# Patient Record
Sex: FEMALE | Race: WHITE | NOT HISPANIC OR LATINO | Employment: OTHER | URBAN - METROPOLITAN AREA
[De-identification: names, ages, dates, MRNs, and addresses within clinical notes are randomized per-mention and may not be internally consistent; named-entity substitution may affect disease eponyms.]

---

## 2018-04-05 ENCOUNTER — OFFICE VISIT (OUTPATIENT)
Dept: MEDICAL GROUP | Facility: MEDICAL CENTER | Age: 68
End: 2018-04-05
Payer: MEDICARE

## 2018-04-05 VITALS
HEIGHT: 68 IN | SYSTOLIC BLOOD PRESSURE: 130 MMHG | BODY MASS INDEX: 21.22 KG/M2 | WEIGHT: 139.99 LBS | OXYGEN SATURATION: 98 % | TEMPERATURE: 97.7 F | HEART RATE: 62 BPM | RESPIRATION RATE: 16 BRPM | DIASTOLIC BLOOD PRESSURE: 72 MMHG

## 2018-04-05 DIAGNOSIS — G62.9 NEUROPATHY: ICD-10-CM

## 2018-04-05 DIAGNOSIS — M81.0 OSTEOPOROSIS OF MULTIPLE SITES: ICD-10-CM

## 2018-04-05 DIAGNOSIS — Z85.43 HISTORY OF OVARIAN CANCER: ICD-10-CM

## 2018-04-05 DIAGNOSIS — Z76.89 ESTABLISHING CARE WITH NEW DOCTOR, ENCOUNTER FOR: ICD-10-CM

## 2018-04-05 DIAGNOSIS — F51.01 PRIMARY INSOMNIA: ICD-10-CM

## 2018-04-05 DIAGNOSIS — R23.2 VASOMOTOR FLUSHING: ICD-10-CM

## 2018-04-05 DIAGNOSIS — Z85.828 HISTORY OF SKIN CANCER: ICD-10-CM

## 2018-04-05 DIAGNOSIS — H61.21 IMPACTED CERUMEN OF RIGHT EAR: ICD-10-CM

## 2018-04-05 DIAGNOSIS — I10 ESSENTIAL HYPERTENSION: ICD-10-CM

## 2018-04-05 DIAGNOSIS — K86.89 PANCREATIC INSUFFICIENCY: ICD-10-CM

## 2018-04-05 PROCEDURE — 99204 OFFICE O/P NEW MOD 45 MIN: CPT | Performed by: INTERNAL MEDICINE

## 2018-04-05 RX ORDER — CHOLECALCIFEROL (VITAMIN D3) 50 MCG
TABLET ORAL DAILY
COMMUNITY
End: 2018-04-05

## 2018-04-05 ASSESSMENT — PATIENT HEALTH QUESTIONNAIRE - PHQ9: CLINICAL INTERPRETATION OF PHQ2 SCORE: 0

## 2018-04-05 NOTE — PROGRESS NOTES
Subjective:   Zena Paz is a 67 y.o. female here today to establish care and          Chief complaint: Wax in right ear, history of ovarian cancer    1. Establishing care with new doctor, encounter for    Patient moved from the [place] area with her  recently. They have been going back and forth to [place] for about 5 years. She is an avid hiker and has climbed many high peaks in [place], [place], and throughout the [place].    2. History of ovarian cancer    Patient was diagnosed with stage IIIC ovarian cancer in 2012. She is status post radical hysterectomy and chemotherapy. She is followed every 3 months at [facility] with tumor markers and every 6 months with scans. She currently has no evidence of disease. She attends a free [facility] [place] every summer for survivors of ovarian cancer.    3. Neuropathy (CMS-HCC)    Patient has neuropathy in hands and feet due to her chemotherapy regimen. At this time she is not taking anything for it is controlled.    4. Essential hypertension    Currently blood pressures are well controlled on low-dose atenolol.    5. Pancreatic insufficiency    Patient was diagnosed with mild pancreatic insufficiency by a gastroenterologist in [place]. She takes Creon occasionally, she is not convinced that this is really helping much however.    6. Vasomotor flushing    Patient was started on Lexapro 10 mg many years ago for hot flashes. She feels that also has help to stabilize her mood.    7. Primary insomnia    Patient takes Xanax 0.5 mg infrequently for insomnia.    8. History of skin cancer    Patient has a history of non-melanoma skin cancer, she is requesting referral to dermatology for her annual skin survey.    9. Osteoporosis of multiple sites    Following her chemotherapy patient developed osteoporosis and was started on Prolia. She has been on this for almost 4 years now. She was recommended to transition to Fosamax in August 2018. Her last DEXA scan was in 2017 and revealed osteopenia  (improved bone density from previous).    10. Impacted cerumen of right ear          Current medicines (including changes today)  Current Outpatient Prescriptions   Medication Sig Dispense Refill   • Pancrelipase, Lip-Prot-Amyl, 58237 units Cap DR Particles Take  by mouth.     • atenolol (TENORMIN) 25 MG TABS Take 25 mg by mouth every evening.     • escitalopram (LEXAPRO) 10 MG TABS Take 10 mg by mouth every morning.     • aspirin (ASA) 81 MG CHEW Take 81 mg by mouth every day.     • Multiple Vitamins-Minerals (SENIOR MULTIVITAMIN PLUS PO) Take 1 Tab by mouth every morning.     • docosahexanoic acid (OMEGA 3 FA) 1000 MG CAPS Take 1,000 mg by mouth every morning.     • vitamin D (CHOLECALCIFEROL) 1000 UNIT TABS Take 2,000 Units by mouth every day.     • lorazepam (ATIVAN) 0.5 MG TABS Take 0.5 mg by mouth 3 times a day as needed. For anxiety/sleep/nausea      • Polyethyl Glycol-Propyl Glycol (SYSTANE OP) Place 1 Drop in both eyes 2 times a day as needed. Indications: **OTC**       No current facility-administered medications for this visit.      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    Social History     Social History   • Marital status:      Spouse name: N/A   • Number of children: N/A   • Years of education: N/A     Social History Main Topics   • Smoking status: Never Smoker   • Smokeless tobacco: Never Used   • Alcohol use Yes      Comment: occasional   • Drug use: No   • Sexual activity: Yes     Partners: Male     Birth control/ protection: Post-Menopausal     Other Topics Concern   • Sleep Concern No   • Weight Concern No   • Exercise Yes     Social History Narrative   • No narrative on file     Family History   Problem Relation Age of Onset   • Heart Disease Mother    • Stroke Father        ROS       - Constitutional: Negative for  "fever, chills, unexpected weight change, and fatigue/generalized weakness.     - HEENT: Negative for headaches, vision changes      - Respiratory: Negative for cough, Shortness of breath    - Cardiovascular: Negative for chest pain and bilateral lower extremity edema.     - Gastrointestinal: Negative for heartburn,  abdominal pain,  occasional loose stools    - Genitourinary: Negative for dysuria  and urinary incontinence.    - Musculoskeletal: Negative for  back pain and joint pain.     - Skin: Negative for rash     - Neurological: Negative for dizziness,  tremors, focal weakness     - Psychiatric/Behavioral: Negative for depression and memory loss.             Objective:     Blood pressure 130/72, pulse 62, temperature 36.5 °C (97.7 °F), resp. rate 16, height 1.715 m (5' 7.5\"), weight 63.5 kg (139 lb 15.9 oz), SpO2 98 %, not currently breastfeeding. Body mass index is 21.6 kg/m².     Physical Exam:  Constitutional: Alert, no distress.  Skin: Warm, dry, good turgor, no rashes in visible areas.  Eye: Equal, round and reactive, conjunctiva clear, lids normal.  ENMT: Lips without lesions, good dentition, oropharynx clear. Hearing grossly intact.  Neck: No masses, no thyromegaly. No cervical or supraclavicular lymphadenopathy  Respiratory: Unlabored respiratory effort, lungs clear to auscultation, no wheezes, no rhonchi.  Cardiovascular: Regular rate and rhythm, without murmur, no edema.  Abdomen: Soft, non-tender, no masses, no hepatosplenomegaly.  Psych: Alert and oriented x3, normal affect and mood. Insight and judgment good            Assessment and Plan:   The following treatment plan was discussed    1. Establishing care with new doctor, encounter for    Last colonoscopy 2 years ago, continues to be followed closely by [facility].    2. History of ovarian cancer    Continues to be followed by [facility] every 3 months    3. Neuropathy (CMS-HCC)    Stable, no treatment indicated    4. Essential " hypertension    Well-controlled, continue atenolol 25 mg.    5. Pancreatic insufficiency        6. Vasomotor flushing    Controlled, continue Lexapro 10 mg    7. Primary insomnia    Not active issue currently, has when necessary Xanax    8. History of skin cancer      - REFERRAL TO DERMATOLOGY    9. Osteoporosis of multiple sites    Will start Fosamax 70 mg weekly in August, up-to-date on bone density    10. Impacted cerumen of right ear    Irrigated today      Followup: Return in about 4 months (around 8/5/2018).

## 2018-05-21 LAB — HBA1C MFR BLD: 5.8 % (ref ?–5.8)

## 2018-05-23 ENCOUNTER — PATIENT MESSAGE (OUTPATIENT)
Dept: MEDICAL GROUP | Facility: MEDICAL CENTER | Age: 68
End: 2018-05-23

## 2018-05-24 ENCOUNTER — PATIENT MESSAGE (OUTPATIENT)
Dept: MEDICAL GROUP | Facility: MEDICAL CENTER | Age: 68
End: 2018-05-24

## 2018-05-24 NOTE — TELEPHONE ENCOUNTER
From: Zena Paz  To: aTmmy Garza M.D.  Sent: 5/24/2018 8:05 AM PDT  Subject: Test Result Question    Clemente Garza,  Thank you for getting back to me so quickly. My white blood count was 3.59 this time. Last July it was 5.18. It’s been 5+ years since I finished chemo, so I am puzzled. It was 3.86 in 2014, but in between now and then it has been in the 4s, 5s, and one 6.05.   I’m feeling a bit tired, but otherwise I’m ok. I have my next appointment with you in mid- August.   Padmini   ----- Message -----  From: Tammy Garza M.D.  Sent: 5/24/2018 7:38 AM PDT  To: Zena Paz  Subject: RE: Test Result Question  Clemente Washington,    I would be happy to follow you as your PCP. I don't believe I have seen your recent CBC, however sometimes the white cell count can remain low following chemotherapy. Do you know how low it was? There wouldn't be anything you could do about it at this time, we can just follow it. As long as you're not getting frequent infections it is adequate.  Best,   Dr. Garza    ----- Message -----   From: Zena Paz   Sent: 5/23/2018 1:30 PM PDT   To: Tammy Garza M.D.  Subject: Test Result Question    Clemente Garza,  I wanted to let you know that I just had some blood tests at [facility]. The CA-125 and HE4 (still waiting for results of HE4) were ordered by my oncologist, Dr. Neil Scott. The other tests were ordered by my internist, Dr. Claudia Bernstein. She is retiring in 2 days! If it’s ok with you, I will not look for a replacement internist at [facility]. I’m hoping that I can just have you as my primary care physician.  I have a question about my white blood cell count. It was very low during chemo, but I’m wondering why it might still be low now. Is there anything special I should be doing?  Thank you.  Padmini

## 2018-07-25 RX ORDER — ESCITALOPRAM OXALATE 10 MG/1
10 TABLET ORAL EVERY MORNING
Qty: 90 TAB | Refills: 0 | Status: SHIPPED | OUTPATIENT
Start: 2018-07-25 | End: 2018-08-20

## 2018-07-25 RX ORDER — ATENOLOL 25 MG/1
25 TABLET ORAL EVERY EVENING
Qty: 90 TAB | Refills: 0 | Status: SHIPPED | OUTPATIENT
Start: 2018-07-25 | End: 2018-08-06 | Stop reason: SDUPTHER

## 2018-07-25 NOTE — TELEPHONE ENCOUNTER
Was the patient seen in the last year in this department? Yes     Does patient have an active prescription for medications requested? No     Received Request Via: Patient     Future Appointments       Provider Department Center    8/14/2018 1:00 PM Tammy Garza M.D. [facility]    11/19/2018 8:30 AM Miriam Church M.D. [facility]

## 2018-08-14 ENCOUNTER — PATIENT MESSAGE (OUTPATIENT)
Dept: MEDICAL GROUP | Facility: MEDICAL CENTER | Age: 68
End: 2018-08-14

## 2018-08-14 ENCOUNTER — OFFICE VISIT (OUTPATIENT)
Dept: MEDICAL GROUP | Facility: MEDICAL CENTER | Age: 68
End: 2018-08-14
Payer: MEDICARE

## 2018-08-14 VITALS
RESPIRATION RATE: 16 BRPM | SYSTOLIC BLOOD PRESSURE: 122 MMHG | BODY MASS INDEX: 21.38 KG/M2 | OXYGEN SATURATION: 97 % | TEMPERATURE: 98.2 F | DIASTOLIC BLOOD PRESSURE: 76 MMHG | HEIGHT: 68 IN | WEIGHT: 141.09 LBS | HEART RATE: 58 BPM

## 2018-08-14 DIAGNOSIS — R79.89 ELEVATED PTHRP LEVEL: ICD-10-CM

## 2018-08-14 DIAGNOSIS — N18.30 CHRONIC KIDNEY DISEASE, STAGE 3, MOD DECREASED GFR (HCC): ICD-10-CM

## 2018-08-14 DIAGNOSIS — Z85.43 HISTORY OF OVARIAN CANCER: ICD-10-CM

## 2018-08-14 DIAGNOSIS — Z23 NEED FOR VACCINATION: ICD-10-CM

## 2018-08-14 DIAGNOSIS — Z12.31 ENCOUNTER FOR SCREENING MAMMOGRAM FOR BREAST CANCER: ICD-10-CM

## 2018-08-14 DIAGNOSIS — M81.0 AGE-RELATED OSTEOPOROSIS WITHOUT CURRENT PATHOLOGICAL FRACTURE: ICD-10-CM

## 2018-08-14 PROBLEM — H61.21 IMPACTED CERUMEN OF RIGHT EAR: Status: RESOLVED | Noted: 2018-04-05 | Resolved: 2018-08-14

## 2018-08-14 PROBLEM — Z85.828 HISTORY OF SKIN CANCER: Status: RESOLVED | Noted: 2018-04-05 | Resolved: 2018-08-14

## 2018-08-14 PROCEDURE — 99214 OFFICE O/P EST MOD 30 MIN: CPT | Mod: 25 | Performed by: INTERNAL MEDICINE

## 2018-08-14 PROCEDURE — 90632 HEPA VACCINE ADULT IM: CPT | Performed by: INTERNAL MEDICINE

## 2018-08-14 PROCEDURE — 90471 IMMUNIZATION ADMIN: CPT | Performed by: INTERNAL MEDICINE

## 2018-08-14 RX ORDER — ALENDRONATE SODIUM 70 MG/1
TABLET ORAL
COMMUNITY
End: 2018-08-20

## 2018-08-14 RX ORDER — HYDROCORTISONE ACETATE 25 MG/1
25 SUPPOSITORY RECTAL EVERY 12 HOURS
COMMUNITY
End: 2018-08-20

## 2018-08-15 ENCOUNTER — PATIENT MESSAGE (OUTPATIENT)
Dept: MEDICAL GROUP | Facility: MEDICAL CENTER | Age: 68
End: 2018-08-15

## 2018-08-15 NOTE — PROGRESS NOTES
Chief Complaint   Patient presents with   • Immunizations     pt flying out to [facility]     Chief complaint: Follow-up ovarian cancer and osteoporosis.    HISTORY OF PRESENT ILLNESS: Patient is a 68 y.o. female patient who presents today to discuss the evaluation and management of:          1. History of ovarian cancer    Patient was diagnosed with stage IIIc ovarian cancer in 2012.  She is status post total hysterectomy and chemotherapy.  She just had her follow-up with her oncologist at [facility] a couple of weeks ago.  She had extensive blood work, whole-body MRI and physical exam, all of which were negative for recurrent disease.  She is doing well, she has residual neuropathy from the chemo.    2. Chronic kidney disease, stage 3, mod decreased GFR    Patient's GFR is in the 40s since her chemotherapy.  It has been stable, she has seen a nephrologist at [facility] and follows with him annually.    3. Encounter for screening mammogram for breast cancer    Patient is due for her annual mammogram.  She is requesting whole breast screening ultrasound as she has very dense breasts.    4. Age-related osteoporosis without current pathological fracture    Patient has been treated with Prolia for 4 years.  Her bone specialist in [place] recommended that she transition over to Fosamax this month.  Last DEXA scan in 2017 revealed osteopenia, which was improved from prior.    5. Elevated PTHrP level (HCC)    Interestingly, patient has had elevated PTH levels over the last year or so.  Looking through care everywhere, her most recent one was 118, up from 105 about 6 months ago.  Calcium levels have been normal.  She had a thyroid ultrasound which did not show any parathyroid nodules.    6. Need for vaccination    Patient will be traveling to [facility] for a track in October.  She is inquiring about any vaccinations that she might need.  She is up-to-date with Tdap, had typhoid 1 year ago, and hepatitis B many years ago.        Patient Active  Problem List    Diagnosis Date Noted   • Chronic kidney disease, stage 3, mod decreased GFR 08/14/2018   • Age-related osteoporosis without current pathological fracture 08/14/2018   • Elevated PTHrP level (HCC) 08/14/2018   • History of ovarian cancer 04/05/2018   • Neuropathy (HCC) 04/05/2018   • Pancreatic insufficiency 04/05/2018   • Vasomotor flushing 04/05/2018   • Primary insomnia 04/05/2018   • HTN (hypertension) 08/30/2012        Allergies:Latex and Tetracycline    Current meds including changes today  Current Outpatient Prescriptions   Medication Sig Dispense Refill   • hydrocortisone (ANUSOL-HC) 25 MG Suppos Insert 25 mg in rectum every 12 hours.     • vitamin D (CHOLECALCIFEROL) 1000 UNIT TABS Take 2,000 Units by mouth every day.     • alendronate (FOSAMAX) 70 MG Tab Take  by mouth.     • atenolol (TENORMIN) 25 MG Tab Take 1 Tab by mouth every evening. 10 Tab 0   • escitalopram (LEXAPRO) 10 MG Tab Take 1 Tab by mouth every morning. 90 Tab 0   • Pancrelipase, Lip-Prot-Amyl, 04912 units Cap DR Particles Take  by mouth.     • aspirin (ASA) 81 MG CHEW Take 81 mg by mouth every day.     • lorazepam (ATIVAN) 0.5 MG TABS Take 0.5 mg by mouth 3 times a day as needed. For anxiety/sleep/nausea      • Multiple Vitamins-Minerals (SENIOR MULTIVITAMIN PLUS PO) Take 1 Tab by mouth every morning.     • docosahexanoic acid (OMEGA 3 FA) 1000 MG CAPS Take 1,000 mg by mouth every morning.     • Polyethyl Glycol-Propyl Glycol (SYSTANE OP) Place 1 Drop in both eyes 2 times a day as needed. Indications: **OTC**       No current facility-administered medications for this visit.      Social History   Substance Use Topics   • Smoking status: Never Smoker   • Smokeless tobacco: Never Used   • Alcohol use Yes      Comment: occasional     Social History     Social History Narrative   • No narrative on file       Family History   Problem Relation Age of Onset   • Heart Disease Mother    • Stroke Father        Review of Systems:  No  "chest pain, No shortness of breath, No dyspnea on exertion  Gastrointestinal ROS: No abdominal pain, No nausea, vomiting, diarrhea, or constipation        Exam:      Blood pressure 122/76, pulse (!) 58, temperature 36.8 °C (98.2 °F), resp. rate 16, height 1.715 m (5' 7.5\"), weight 64 kg (141 lb 1.5 oz), SpO2 97 %, not currently breastfeeding.  General:  Well nourished, well developed female in NAD affect and mood within normal limits  Head is grossly normal.  Neck: Supple without adenopathy  Pulmonary: Clear to ausculation.  Normal effort. No rales, rhonchi, or wheezing.  Cardiovascular: Regular rate and rhythm without murmur.   Extremities: no clubbing, cyanosis, or edema.  Neuro: moves all extremities symmetrically    Please note that this dictation was created using voice recognition software. I have made every reasonable attempt to correct obvious errors, but I expect that there are errors of grammar and possibly content that I did not discover before finalizing the note.    Assessment/Plan:  1. History of ovarian cancer    -Without evidence of recurrent disease    2. Chronic kidney disease, stage 3, mod decreased GFR    Stable function, continue to monitor    3. Encounter for screening mammogram for breast cancer      - MA-MAMMO SCREENING BILAT W/ABDI W/CAD; Future  - US-SCREENING WHOLE BREAST (3D SCREENING); Future    4. Age-related osteoporosis without current pathological fracture    Transitioning to Fosamax 70 mg weekly    5. Elevated PTHrP level (HCC)    -Monitor PTH, if it continues to rise consider doing a nuclear study as this is more sensitive for parathyroid adenomas  - PTH INTACT W/CA    6. Need for vaccination      - HEPATITIS A VACCINE ADULT IM    Followup: Return in about 6 months (around 2/14/2019).        "

## 2018-08-16 ENCOUNTER — PATIENT MESSAGE (OUTPATIENT)
Dept: MEDICAL GROUP | Facility: MEDICAL CENTER | Age: 68
End: 2018-08-16

## 2018-08-16 NOTE — TELEPHONE ENCOUNTER
From: Zena Paz  To: Tammy Garza M.D.  Sent: 8/16/2018 12:06 PM PDT  Subject: Prescription Question    Hi Dr. Garza,  I would actually MUCH rather be on Prolia, since its just 2x per year, and I didn’t have any noticeable side affects. I do have gastrointestinal issues, and have always been a bit concerned about taking Fosamax. I only agreed to the Fosamax because I was told that I that 4 years was the maximum time to be on Prolia, and that one can’t just stop suddenly.  What should we do?   Thanks,   Padmini   ----- Message -----  From: Tammy Garza M.D.  Sent: 8/16/2018 8:06 AM PDT  To: Zena Paz  Subject: RE: Prescription Question  Padmini,    I do not have a strong opinion either way. My understanding was that the bone specialist at [facility] had recommended the transition to Fosamax.  I do believe that either one is fine. Let me know how you wish to proceed.    Dr Garza    ----- Message -----   From: Zena Paz   Sent: 8/15/2018 5:39 PM PDT   To: Tammy Garza M.D.  Subject: Prescription Question    Dear Dr. Garza,  I just picked up my prescription for Fosamax, but upon doing some research, I am wondering whether stopping Prolia is the right decision for me. I have been on it 4 years, but am now reading that many patients stay on it for 8-10 years. I didn’t realize this when you and I talked. What do you think?  Sorry to bother you with so many extra questions.  Padmini

## 2018-08-19 ENCOUNTER — APPOINTMENT (OUTPATIENT)
Dept: RADIOLOGY | Facility: MEDICAL CENTER | Age: 68
DRG: 389 | End: 2018-08-19
Attending: EMERGENCY MEDICINE
Payer: MEDICARE

## 2018-08-19 ENCOUNTER — HOSPITAL ENCOUNTER (INPATIENT)
Facility: MEDICAL CENTER | Age: 68
LOS: 1 days | DRG: 389 | End: 2018-08-21
Attending: EMERGENCY MEDICINE | Admitting: HOSPITALIST
Payer: MEDICARE

## 2018-08-19 DIAGNOSIS — K56.609 SMALL BOWEL OBSTRUCTION (HCC): ICD-10-CM

## 2018-08-19 LAB
BASOPHILS # BLD AUTO: 0.8 % (ref 0–1.8)
BASOPHILS # BLD: 0.11 K/UL (ref 0–0.12)
EOSINOPHIL # BLD AUTO: 0.07 K/UL (ref 0–0.51)
EOSINOPHIL NFR BLD: 0.5 % (ref 0–6.9)
ERYTHROCYTE [DISTWIDTH] IN BLOOD BY AUTOMATED COUNT: 41.6 FL (ref 35.9–50)
HCT VFR BLD AUTO: 43.7 % (ref 37–47)
HGB BLD-MCNC: 14.9 G/DL (ref 12–16)
IMM GRANULOCYTES # BLD AUTO: 0.05 K/UL (ref 0–0.11)
IMM GRANULOCYTES NFR BLD AUTO: 0.4 % (ref 0–0.9)
LACTATE BLD-SCNC: 3.5 MMOL/L (ref 0.5–2)
LYMPHOCYTES # BLD AUTO: 2.52 K/UL (ref 1–4.8)
LYMPHOCYTES NFR BLD: 18 % (ref 22–41)
MCH RBC QN AUTO: 30 PG (ref 27–33)
MCHC RBC AUTO-ENTMCNC: 34.1 G/DL (ref 33.6–35)
MCV RBC AUTO: 87.9 FL (ref 81.4–97.8)
MONOCYTES # BLD AUTO: 0.88 K/UL (ref 0–0.85)
MONOCYTES NFR BLD AUTO: 6.3 % (ref 0–13.4)
NEUTROPHILS # BLD AUTO: 10.37 K/UL (ref 2–7.15)
NEUTROPHILS NFR BLD: 74 % (ref 44–72)
NRBC # BLD AUTO: 0 K/UL
NRBC BLD-RTO: 0 /100 WBC
PLATELET # BLD AUTO: 307 K/UL (ref 164–446)
PMV BLD AUTO: 10.2 FL (ref 9–12.9)
RBC # BLD AUTO: 4.97 M/UL (ref 4.2–5.4)
WBC # BLD AUTO: 14 K/UL (ref 4.8–10.8)

## 2018-08-19 PROCEDURE — 85025 COMPLETE CBC W/AUTO DIFF WBC: CPT

## 2018-08-19 PROCEDURE — 83690 ASSAY OF LIPASE: CPT

## 2018-08-19 PROCEDURE — 99285 EMERGENCY DEPT VISIT HI MDM: CPT

## 2018-08-19 PROCEDURE — 700111 HCHG RX REV CODE 636 W/ 250 OVERRIDE (IP): Performed by: EMERGENCY MEDICINE

## 2018-08-19 PROCEDURE — 700105 HCHG RX REV CODE 258: Performed by: EMERGENCY MEDICINE

## 2018-08-19 PROCEDURE — 80053 COMPREHEN METABOLIC PANEL: CPT

## 2018-08-19 PROCEDURE — 83605 ASSAY OF LACTIC ACID: CPT

## 2018-08-19 PROCEDURE — 96374 THER/PROPH/DIAG INJ IV PUSH: CPT

## 2018-08-19 PROCEDURE — 96375 TX/PRO/DX INJ NEW DRUG ADDON: CPT

## 2018-08-19 RX ORDER — MORPHINE SULFATE 4 MG/ML
4 INJECTION, SOLUTION INTRAMUSCULAR; INTRAVENOUS ONCE
Status: COMPLETED | OUTPATIENT
Start: 2018-08-19 | End: 2018-08-19

## 2018-08-19 RX ORDER — ONDANSETRON 2 MG/ML
4 INJECTION INTRAMUSCULAR; INTRAVENOUS ONCE
Status: COMPLETED | OUTPATIENT
Start: 2018-08-19 | End: 2018-08-19

## 2018-08-19 RX ORDER — SODIUM CHLORIDE 9 MG/ML
INJECTION, SOLUTION INTRAVENOUS CONTINUOUS
Status: DISCONTINUED | OUTPATIENT
Start: 2018-08-19 | End: 2018-08-20

## 2018-08-19 RX ADMIN — SODIUM CHLORIDE: 9 INJECTION, SOLUTION INTRAVENOUS at 23:12

## 2018-08-19 RX ADMIN — ONDANSETRON 4 MG: 2 INJECTION, SOLUTION INTRAMUSCULAR; INTRAVENOUS at 23:12

## 2018-08-19 RX ADMIN — MORPHINE SULFATE 4 MG: 4 INJECTION INTRAVENOUS at 23:12

## 2018-08-19 ASSESSMENT — PAIN SCALES - GENERAL
PAINLEVEL_OUTOF10: 10
PAINLEVEL_OUTOF10: 10

## 2018-08-20 PROBLEM — N18.9 ACUTE ON CHRONIC RENAL INSUFFICIENCY: Status: ACTIVE | Noted: 2018-08-20

## 2018-08-20 PROBLEM — D72.829 LEUKOCYTOSIS: Status: ACTIVE | Noted: 2018-08-20

## 2018-08-20 PROBLEM — K56.609 SBO (SMALL BOWEL OBSTRUCTION) (HCC): Status: ACTIVE | Noted: 2018-08-20

## 2018-08-20 PROBLEM — N28.9 ACUTE ON CHRONIC RENAL INSUFFICIENCY: Status: ACTIVE | Noted: 2018-08-20

## 2018-08-20 LAB
ALBUMIN SERPL BCP-MCNC: 4.7 G/DL (ref 3.2–4.9)
ALBUMIN/GLOB SERPL: 1.6 G/DL
ALP SERPL-CCNC: 26 U/L (ref 30–99)
ALT SERPL-CCNC: 11 U/L (ref 2–50)
ANION GAP SERPL CALC-SCNC: 19 MMOL/L (ref 0–11.9)
APPEARANCE UR: CLEAR
AST SERPL-CCNC: 22 U/L (ref 12–45)
BILIRUB SERPL-MCNC: 0.8 MG/DL (ref 0.1–1.5)
BILIRUB UR QL STRIP.AUTO: NEGATIVE
BUN SERPL-MCNC: 41 MG/DL (ref 8–22)
CALCIUM SERPL-MCNC: 10.6 MG/DL (ref 8.5–10.5)
CHLORIDE SERPL-SCNC: 99 MMOL/L (ref 96–112)
CO2 SERPL-SCNC: 17 MMOL/L (ref 20–33)
COLOR UR: ABNORMAL
CREAT SERPL-MCNC: 1.98 MG/DL (ref 0.5–1.4)
GLOBULIN SER CALC-MCNC: 2.9 G/DL (ref 1.9–3.5)
GLUCOSE SERPL-MCNC: 165 MG/DL (ref 65–99)
GLUCOSE UR STRIP.AUTO-MCNC: NEGATIVE MG/DL
KETONES UR STRIP.AUTO-MCNC: 15 MG/DL
LACTATE BLD-SCNC: 1 MMOL/L (ref 0.5–2)
LEUKOCYTE ESTERASE UR QL STRIP.AUTO: NEGATIVE
LIPASE SERPL-CCNC: 45 U/L (ref 11–82)
MICRO URNS: ABNORMAL
NITRITE UR QL STRIP.AUTO: NEGATIVE
PH UR STRIP.AUTO: 5 [PH]
POTASSIUM SERPL-SCNC: 3.6 MMOL/L (ref 3.6–5.5)
PROT SERPL-MCNC: 7.6 G/DL (ref 6–8.2)
PROT UR QL STRIP: NEGATIVE MG/DL
RBC UR QL AUTO: NEGATIVE
SODIUM SERPL-SCNC: 135 MMOL/L (ref 135–145)
SP GR UR STRIP.AUTO: 1.02
UROBILINOGEN UR STRIP.AUTO-MCNC: 1 MG/DL

## 2018-08-20 PROCEDURE — 96372 THER/PROPH/DIAG INJ SC/IM: CPT

## 2018-08-20 PROCEDURE — 81003 URINALYSIS AUTO W/O SCOPE: CPT

## 2018-08-20 PROCEDURE — 99223 1ST HOSP IP/OBS HIGH 75: CPT | Mod: AI | Performed by: HOSPITALIST

## 2018-08-20 PROCEDURE — A9270 NON-COVERED ITEM OR SERVICE: HCPCS | Performed by: HOSPITALIST

## 2018-08-20 PROCEDURE — 700117 HCHG RX CONTRAST REV CODE 255: Performed by: EMERGENCY MEDICINE

## 2018-08-20 PROCEDURE — 36415 COLL VENOUS BLD VENIPUNCTURE: CPT

## 2018-08-20 PROCEDURE — 770006 HCHG ROOM/CARE - MED/SURG/GYN SEMI*

## 2018-08-20 PROCEDURE — 700102 HCHG RX REV CODE 250 W/ 637 OVERRIDE(OP): Performed by: HOSPITALIST

## 2018-08-20 PROCEDURE — 700105 HCHG RX REV CODE 258: Performed by: HOSPITALIST

## 2018-08-20 PROCEDURE — 74176 CT ABD & PELVIS W/O CONTRAST: CPT

## 2018-08-20 PROCEDURE — 700111 HCHG RX REV CODE 636 W/ 250 OVERRIDE (IP): Performed by: HOSPITALIST

## 2018-08-20 PROCEDURE — 83605 ASSAY OF LACTIC ACID: CPT

## 2018-08-20 PROCEDURE — 700111 HCHG RX REV CODE 636 W/ 250 OVERRIDE (IP): Performed by: EMERGENCY MEDICINE

## 2018-08-20 PROCEDURE — 96376 TX/PRO/DX INJ SAME DRUG ADON: CPT

## 2018-08-20 RX ORDER — ONDANSETRON 4 MG/1
4 TABLET, ORALLY DISINTEGRATING ORAL EVERY 4 HOURS PRN
Status: DISCONTINUED | OUTPATIENT
Start: 2018-08-20 | End: 2018-08-21 | Stop reason: HOSPADM

## 2018-08-20 RX ORDER — ONDANSETRON 2 MG/ML
4 INJECTION INTRAMUSCULAR; INTRAVENOUS EVERY 4 HOURS PRN
Status: DISCONTINUED | OUTPATIENT
Start: 2018-08-20 | End: 2018-08-21 | Stop reason: HOSPADM

## 2018-08-20 RX ORDER — OXYCODONE HYDROCHLORIDE 5 MG/1
2.5 TABLET ORAL
Status: DISCONTINUED | OUTPATIENT
Start: 2018-08-20 | End: 2018-08-21 | Stop reason: HOSPADM

## 2018-08-20 RX ORDER — SODIUM CHLORIDE 9 MG/ML
INJECTION, SOLUTION INTRAVENOUS CONTINUOUS
Status: DISCONTINUED | OUTPATIENT
Start: 2018-08-20 | End: 2018-08-21 | Stop reason: HOSPADM

## 2018-08-20 RX ORDER — ESCITALOPRAM OXALATE 10 MG/1
10 TABLET ORAL EVERY MORNING
Status: DISCONTINUED | OUTPATIENT
Start: 2018-08-20 | End: 2018-08-21 | Stop reason: HOSPADM

## 2018-08-20 RX ORDER — ATENOLOL 25 MG/1
25 TABLET ORAL EVERY EVENING
Status: DISCONTINUED | OUTPATIENT
Start: 2018-08-20 | End: 2018-08-20

## 2018-08-20 RX ORDER — OXYCODONE HYDROCHLORIDE 5 MG/1
5 TABLET ORAL
Status: DISCONTINUED | OUTPATIENT
Start: 2018-08-20 | End: 2018-08-21 | Stop reason: HOSPADM

## 2018-08-20 RX ORDER — MORPHINE SULFATE 4 MG/ML
4 INJECTION, SOLUTION INTRAMUSCULAR; INTRAVENOUS ONCE
Status: COMPLETED | OUTPATIENT
Start: 2018-08-20 | End: 2018-08-20

## 2018-08-20 RX ORDER — ATENOLOL 50 MG/1
25 TABLET ORAL EVERY EVENING
Status: DISCONTINUED | OUTPATIENT
Start: 2018-08-20 | End: 2018-08-21 | Stop reason: HOSPADM

## 2018-08-20 RX ORDER — ESCITALOPRAM OXALATE 10 MG/1
10 TABLET ORAL EVERY EVENING
COMMUNITY
End: 2018-11-20

## 2018-08-20 RX ORDER — HEPARIN SODIUM 5000 [USP'U]/ML
5000 INJECTION, SOLUTION INTRAVENOUS; SUBCUTANEOUS EVERY 8 HOURS
Status: DISCONTINUED | OUTPATIENT
Start: 2018-08-20 | End: 2018-08-21 | Stop reason: HOSPADM

## 2018-08-20 RX ORDER — MORPHINE SULFATE 4 MG/ML
2 INJECTION, SOLUTION INTRAMUSCULAR; INTRAVENOUS
Status: DISCONTINUED | OUTPATIENT
Start: 2018-08-20 | End: 2018-08-21 | Stop reason: HOSPADM

## 2018-08-20 RX ADMIN — MORPHINE SULFATE 4 MG: 4 INJECTION INTRAVENOUS at 02:26

## 2018-08-20 RX ADMIN — HEPARIN SODIUM 5000 UNITS: 5000 INJECTION, SOLUTION INTRAVENOUS; SUBCUTANEOUS at 22:02

## 2018-08-20 RX ADMIN — SODIUM CHLORIDE: 9 INJECTION, SOLUTION INTRAVENOUS at 22:03

## 2018-08-20 RX ADMIN — IOHEXOL 50 ML: 240 INJECTION, SOLUTION INTRATHECAL; INTRAVASCULAR; INTRAVENOUS; ORAL at 01:15

## 2018-08-20 RX ADMIN — ESCITALOPRAM OXALATE 10 MG: 10 TABLET ORAL at 06:16

## 2018-08-20 RX ADMIN — SODIUM CHLORIDE: 9 INJECTION, SOLUTION INTRAVENOUS at 11:35

## 2018-08-20 RX ADMIN — HEPARIN SODIUM 5000 UNITS: 5000 INJECTION, SOLUTION INTRAVENOUS; SUBCUTANEOUS at 06:17

## 2018-08-20 RX ADMIN — ATENOLOL 25 MG: 25 TABLET ORAL at 06:17

## 2018-08-20 RX ADMIN — SODIUM CHLORIDE: 9 INJECTION, SOLUTION INTRAVENOUS at 06:17

## 2018-08-20 RX ADMIN — HEPARIN SODIUM 5000 UNITS: 5000 INJECTION, SOLUTION INTRAVENOUS; SUBCUTANEOUS at 14:32

## 2018-08-20 ASSESSMENT — LIFESTYLE VARIABLES
DO YOU DRINK ALCOHOL: NO
EVER_SMOKED: NEVER

## 2018-08-20 ASSESSMENT — COGNITIVE AND FUNCTIONAL STATUS - GENERAL
MOBILITY SCORE: 24
DAILY ACTIVITIY SCORE: 24
SUGGESTED CMS G CODE MODIFIER MOBILITY: CH
SUGGESTED CMS G CODE MODIFIER DAILY ACTIVITY: CH

## 2018-08-20 ASSESSMENT — PATIENT HEALTH QUESTIONNAIRE - PHQ9
2. FEELING DOWN, DEPRESSED, IRRITABLE, OR HOPELESS: NOT AT ALL
2. FEELING DOWN, DEPRESSED, IRRITABLE, OR HOPELESS: NOT AT ALL
SUM OF ALL RESPONSES TO PHQ9 QUESTIONS 1 AND 2: 0
1. LITTLE INTEREST OR PLEASURE IN DOING THINGS: NOT AT ALL
SUM OF ALL RESPONSES TO PHQ9 QUESTIONS 1 AND 2: 0
1. LITTLE INTEREST OR PLEASURE IN DOING THINGS: NOT AT ALL
1. LITTLE INTEREST OR PLEASURE IN DOING THINGS: NOT AT ALL
2. FEELING DOWN, DEPRESSED, IRRITABLE, OR HOPELESS: NOT AT ALL
SUM OF ALL RESPONSES TO PHQ9 QUESTIONS 1 AND 2: 0

## 2018-08-20 ASSESSMENT — ENCOUNTER SYMPTOMS
SORE THROAT: 0
FEVER: 0
CHILLS: 0
MYALGIAS: 0
WHEEZING: 0
COUGH: 0
PALPITATIONS: 0
DIZZINESS: 0
TINGLING: 0
SHORTNESS OF BREATH: 0
ABDOMINAL PAIN: 1
NAUSEA: 1
FOCAL WEAKNESS: 0
HEADACHES: 0
DEPRESSION: 0
DIARRHEA: 0
PHOTOPHOBIA: 0
VOMITING: 1

## 2018-08-20 ASSESSMENT — PAIN SCALES - GENERAL
PAINLEVEL_OUTOF10: 0
PAINLEVEL_OUTOF10: 0
PAINLEVEL_OUTOF10: 2

## 2018-08-20 NOTE — PROGRESS NOTES
Just admitted early this am with recurrent SBO. She will be hydrated, pain will be managed and will monitor for bowel movement.

## 2018-08-20 NOTE — H&P
Hospital Medicine History and Physical    Date of Service  8/20/2018    Chief Complaint  Chief Complaint   Patient presents with   • Vomiting   • Abdominal Pain       History of Presenting Illness  68 y.o. female who presented on 8/19/2018 with abdominal pain with nausea and vomiting.  The patient carries a history of recurrent small bowel obstructions in the setting of several abdominal surgeries including hysterectomy and previous history of ovarian cancer now in remission with peritoneal chemotherapy.  She is otherwise quite healthy and was hiking with her  today when she noted that she was having some abdominal cramping.  She initially thought that this was secondary to low sodium levels but then had cessation of her flatus and felt that her symptoms are similar to her previous small bowel obstructions.  Her abdominal pain is diffuse, cramping, with no alleviating or aggravating factors.  They were associated with nausea and vomiting.  Her last flatus was earlier today, her last bowel movement was prior to arrival to the hospital but she is unsure if it was formed.  Otherwise he has had no fevers, chills, headache, chest pain, recent rhinorrhea or URI or dysuria.  Upon assessment in the emergency room, CT scan of the abdomen shows early SBO.        Primary Care Physician  Tammy Garza M.D.    Consultants  None    Code Status  Full    Review of Systems  Review of Systems   Constitutional: Negative for chills and fever.   HENT: Negative for congestion and sore throat.    Eyes: Negative for photophobia.   Respiratory: Negative for cough, shortness of breath and wheezing.    Cardiovascular: Negative for chest pain and palpitations.   Gastrointestinal: Positive for abdominal pain (Resolved), nausea (Resolve) and vomiting (Resolved). Negative for diarrhea.   Genitourinary: Negative for dysuria.   Musculoskeletal: Negative for myalgias.   Skin: Negative.    Neurological: Negative for dizziness, tingling,  focal weakness and headaches.   Psychiatric/Behavioral: Negative for depression and suicidal ideas.        Past Medical History  Past Medical History:   Diagnosis Date   • Backpain     had back surgery, s1-l5 fusion   • Cancer (HCC)     overian cancer   • Heart murmur    • Hypertension    • Indigestion     sometimes   • Other specified symptom associated with female genital organs     had ca ov the ovary   • Schwannoma of cranial nerve (HCC)        Surgical History  Past Surgical History:   Procedure Laterality Date   • GYN SURGERY     • HYSTERECTOMY RADICAL     • LUMBAR FUSION POSTERIOR     • OTHER ORTHOPEDIC SURGERY      had back surgery       Medications  No current facility-administered medications on file prior to encounter.      Current Outpatient Prescriptions on File Prior to Encounter   Medication Sig Dispense Refill   • alendronate (FOSAMAX) 70 MG Tab Take  by mouth.     • hydrocortisone (ANUSOL-HC) 25 MG Suppos Insert 25 mg in rectum every 12 hours.     • atenolol (TENORMIN) 25 MG Tab Take 1 Tab by mouth every evening. 10 Tab 0   • escitalopram (LEXAPRO) 10 MG Tab Take 1 Tab by mouth every morning. 90 Tab 0   • Pancrelipase, Lip-Prot-Amyl, 01863 units Cap DR Particles Take  by mouth.     • vitamin D (CHOLECALCIFEROL) 1000 UNIT TABS Take 2,000 Units by mouth every day.     • aspirin (ASA) 81 MG CHEW Take 81 mg by mouth every day.     • lorazepam (ATIVAN) 0.5 MG TABS Take 0.5 mg by mouth 3 times a day as needed. For anxiety/sleep/nausea      • Multiple Vitamins-Minerals (SENIOR MULTIVITAMIN PLUS PO) Take 1 Tab by mouth every morning.     • docosahexanoic acid (OMEGA 3 FA) 1000 MG CAPS Take 1,000 mg by mouth every morning.     • Polyethyl Glycol-Propyl Glycol (SYSTANE OP) Place 1 Drop in both eyes 2 times a day as needed. Indications: **OTC**         Family History  Family History   Problem Relation Age of Onset   • Heart Disease Mother    • Stroke Father        Social History  Social History   Substance Use  Topics   • Smoking status: Never Smoker   • Smokeless tobacco: Never Used   • Alcohol use Yes      Comment: occasional       Allergies  Allergies   Allergen Reactions   • Latex Rash   • Tetracycline      Sensitivity to sun; sun burns.  Reaction date; 15 years ago from .        Physical Exam  Laboratory   Hemodynamics  Temp (24hrs), Av °C (96.8 °F), Min:36 °C (96.8 °F), Max:36 °C (96.8 °F)   Temperature: 36 °C (96.8 °F)  Pulse  Av  Min: 77  Max: 77 Heart Rate (Monitored): (!) 55  Blood Pressure : 101/47, NIBP: 129/77      Respiratory      Respiration: 18, Pulse Oximetry: 97 %             Physical Exam   Constitutional: She is oriented to person, place, and time. No distress.   HENT:   Head: Normocephalic and atraumatic.   Right Ear: External ear normal.   Left Ear: External ear normal.   Eyes: EOM are normal. Right eye exhibits no discharge. Left eye exhibits no discharge.   Neck: Neck supple. No JVD present.   Cardiovascular: Normal rate, regular rhythm and normal heart sounds.    Pulmonary/Chest: Effort normal and breath sounds normal. No respiratory distress. She exhibits no tenderness.   Abdominal: Soft. She exhibits no distension. There is no tenderness.   Hypoactive bowel sounds   Musculoskeletal: Normal range of motion. She exhibits no edema.   Neurological: She is alert and oriented to person, place, and time. No cranial nerve deficit.   Skin: Skin is warm and dry. She is not diaphoretic. No erythema.   Psychiatric: She has a normal mood and affect. Her behavior is normal.   Nursing note and vitals reviewed.    Capillary refill less than 3 seconds, distal pulses intact    Recent Labs      18   2251   WBC  14.0*   RBC  4.97   HEMOGLOBIN  14.9   HEMATOCRIT  43.7   MCV  87.9   MCH  30.0   MCHC  34.1   RDW  41.6   PLATELETCT  307   MPV  10.2     Recent Labs      18   2251   SODIUM  135   POTASSIUM  3.6   CHLORIDE  99   CO2  17*   GLUCOSE  165*   BUN  41*   CREATININE  1.98*   CALCIUM   10.6*     Recent Labs      08/19/18   2251   ALTSGPT  11   ASTSGOT  22   ALKPHOSPHAT  26*   TBILIRUBIN  0.8   LIPASE  45   GLUCOSE  165*                 No results found for: TROPONINI    Imaging  Ct-abdomen-pelvis W/o    Result Date: 8/20/2018 8/20/2018 1:03 AM HISTORY/REASON FOR EXAM:  Epigastric Pain dry heaving.c/o abdominal pain and vomiting x 4 hrs. Hx of bowel obstruction. TECHNIQUE/EXAM DESCRIPTION: CT scan of the abdomen and pelvis without contrast. Noncontrast helical scanning was obtained from the diaphragmatic domes through the pubic symphysis. Low dose optimization technique was utilized for this CT exam including automated exposure control and adjustment of the mA and/or kV according to patient size. COMPARISON: 11/21/2013. FINDINGS: Lung Bases: No pulmonary nodules at the lung bases. No pleural or pericardial fluid. Abdomen: Within the limits of noncontrast technique, the liver, spleen, pancreas, and adrenal glands are unremarkable in appearance. Atrophic bilateral kidneys. The gallbladder is unremarkable and there is no biliary dilatation. Multiple mildly dilated loops of small bowel in the mid abdomen, measuring up to 3 cm with air-fluid levels. No transition point identified. Decompressed distal small bowel and colon. There is gas in the colon. Large amount of stool in the colon. The abdominal aorta is normal in caliber. Pelvis: The uterus is surgically absent. No lymph node enlargement. No free fluid, or free air in the abdomen or pelvis. No aggressive bone lesions are seen. Postsurgical change in the lumbar spine. ________________________________    1. Multiple mildly dilated small bowel loops in the mid lower abdomen without obvious transition point. The finding could relate to early partial small bowel obstruction or focal ileus.     Assessment/Plan     Anticipate that patient will need greater than 2 midnights for management of the discussed medical issues.    * SBO (small bowel obstruction)  (HCC)   Assessment & Plan    Patient has a history of previous hysterectomy as well as ovarian cancer with peritoneal chemotherapy and recurrent small bowel obstructions.  Her last obstruction requiring hospital admission was approximately 4 years ago.  CT scan shows early obstruction versus ileus.  Fortunately, her symptoms are now controlled and she is comfortable.  She will be admitted to the hospital and monitored closely on the surgical floor.  Given the resolution of her symptoms, I will hold off on NG tube placement but if she has any return of nausea, vomiting, and abdominal pain, then I will have low tolerance for initiating NG tube to low wall suction.  She will be kept n.p.o. for bowel  rest while we await return of bowel function.  I will start her on IV fluid hydration and symptomatic management for pain and nausea.  Once we have clinical improvement, we will begin to advance her diet.        Acute on chronic renal insufficiency   Assessment & Plan    The patient has been hiking and is dehydrated, this is prerenal worsening of her chronic disease.  Expect improvement with IV fluids which will be continued.  Monitor chemistries to ensure improvement.        Leukocytosis   Assessment & Plan    Patient has had no symptoms of infection, her UA is negative, she is afebrile and vital signs are stable, suspect this is reactive to her SBO.  Treat as noted above and monitor CBCs.        HTN (hypertension)- (present on admission)   Assessment & Plan    Blood pressures well controlled on home atenolol, continue and monitor.  Patient is bradycardic but is asymptomatic.          Prophylaxis: Heparin for DVT prophylaxis, no PPI indicated, bowel protocol as needed

## 2018-08-20 NOTE — PROGRESS NOTES
AOX4  SANDOVAL without deficit  Denies pain but has been in the RUQ of abdomen  Denies vomiting but gets nauseous at times  Maintaining NPO except sips with meds  Up self tolerating well  POC discussed. No acute distresses noted otherwise at this time. Bed in lowest and locked position. Call light within reach. Will continue to monitor.   Awaiting orders for possible surgery today.

## 2018-08-20 NOTE — ASSESSMENT & PLAN NOTE
Blood pressures well controlled on home atenolol, continue and monitor.  Patient is bradycardic but is asymptomatic.

## 2018-08-20 NOTE — ED NOTES
Pt ambulated up to bathroom, pt provided urine sample. Pt denies n/v, dizziness pt denies further needs at this time

## 2018-08-20 NOTE — CARE PLAN
Problem: Safety  Goal: Will remain free from falls  Outcome: PROGRESSING AS EXPECTED  Patient uses call light appropriately when getting out of bed.

## 2018-08-20 NOTE — ED PROVIDER NOTES
ED Provider Note  Chief Complaint:   Abdominal Pain    HPI:  Zena Paz is a 68 y.o. female who presents with chief complaint of abdominal pain, nausea and vomiting.  She has a past medical history significant for stage III ovarian cancer, currently with no evidence of disease.  She does have a prior surgical history of total hysterectomy.  She states her symptoms today feel similar to previous episodes of bowel obstruction that she has had in the past.  Her symptoms began approximately 4 hours prior to arrival, described as a cramping pain localized in the center of the abdomen.  Her pain progressively worsened since time of onset, and is nonradiating.  About 2 hours prior to arrival she developed associated vomiting and describes a sensation of constipation where she feels as though she had to have a bowel movement but could not.  She has not had any associated fevers.  She does describe a sensation of abdominal distention.  No urinary symptoms.  She is not able to identify any exacerbating or alleviating factors.    She has had no associated chest pain, no shortness of breath.  No headaches, no neck or back pain.  She has no abnormal rashes or lesions.    Review of Systems:  See HPI for pertinent positives and negatives. All other systems negative.    Past Medical History:   has a past medical history of Backpain; Cancer (HCC); Heart murmur; Hypertension; Indigestion; Other specified symptom associated with female genital organs; and Schwannoma of cranial nerve (HCC).    Social History:  Social History     Social History Main Topics   • Smoking status: Never Smoker   • Smokeless tobacco: Never Used   • Alcohol use Yes      Comment: occasional   • Drug use: No   • Sexual activity: Yes     Partners: Male     Birth control/ protection: Post-Menopausal       Surgical History:   has a past surgical history that includes gyn surgery; hysterectomy radical; other orthopedic surgery; and lumbar fusion  posterior.    Current Medications:  Home Medications     Reviewed by Faith Fernandes R.N. (Registered Nurse) on 08/19/18 at 2248  Med List Status: Unable to Obtain   Medication Last Dose Status   alendronate (FOSAMAX) 70 MG Tab  Active   aspirin (ASA) 81 MG CHEW 4/5/2018 Active   atenolol (TENORMIN) 25 MG Tab  Active   docosahexanoic acid (OMEGA 3 FA) 1000 MG CAPS 4/5/2018 Active   escitalopram (LEXAPRO) 10 MG Tab  Active   hydrocortisone (ANUSOL-HC) 25 MG Suppos  Active   lorazepam (ATIVAN) 0.5 MG TABS  Active   Multiple Vitamins-Minerals (SENIOR MULTIVITAMIN PLUS PO) 4/5/2018 Active   Pancrelipase, Lip-Prot-Amyl, 15434 units Cap DR Particles 4/5/2018 Active   Polyethyl Glycol-Propyl Glycol (SYSTANE OP)  Active   vitamin D (CHOLECALCIFEROL) 1000 UNIT TABS 8/14/2018 Active                Allergies:  Allergies   Allergen Reactions   • Latex Rash   • Tetracycline      Sensitivity to sun; sun burns.  Reaction date; 15 years ago from 2013.       Physical Exam:  Vital Signs: /47   Pulse 77   Temp 36 °C (96.8 °F)   Resp 18   Wt 64.9 kg (143 lb)   SpO2 97%   BMI 22.07 kg/m²   Constitutional: Alert, uncomfortable appearing  HENT: Moist mucus membranes, normal posterior pharynx, no intraoral lesions  Eyes: Pupils equal and reactive, normal conjunctiva  Neck: Supple, normal range of motion, no stridor  Cardiovascular: Extremities are warm and well perfused, no murmur appreciated, normal cardiac auscultation  Pulmonary: No respiratory distress, normal work of breathing, no accessory muscule usage, breath sounds clear and equal bilaterally  Abdomen: Soft, nondistended, no palpable masses, moderate discomfort on palpation of the periumbilical area, no peritoneal signs, no overlying skin changes, there is a well-healed scar from remote abdominal surgical procedure.  Skin: Warm, dry, no rashes or lesions  Musculoskeletal: Normal range of motion in all extremities, no swelling or deformity noted  Neurologic: Alert,  oriented, normal speech, normal motor function  Psychiatric: Normal and appropriate mood and affect    Medical records reviewed for continuity of care.  Clinic note reviewed from 8/14/18.  Patient was diagnosed with stage III ovarian cancer in 2012, status post total hysterectomy and chemotherapy.  Recent follow-up at [facility] included lab work, whole body MRI, and physical exam which were negative for recurrent disease.  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  She does have CKD stage III, residual from chemotherapy.    Labs:  Labs Reviewed   CBC WITH DIFFERENTIAL - Abnormal; Notable for the following:        Result Value    WBC 14.0 (*)     Neutrophils-Polys 74.00 (*)     Lymphocytes 18.00 (*)     Neutrophils (Absolute) 10.37 (*)     Monos (Absolute) 0.88 (*)     All other components within normal limits   COMP METABOLIC PANEL - Abnormal; Notable for the following:     Co2 17 (*)     Anion Gap 19.0 (*)     Glucose 165 (*)     Bun 41 (*)     Creatinine 1.98 (*)     Calcium 10.6 (*)     Alkaline Phosphatase 26 (*)     All other components within normal limits   LACTIC ACID - Abnormal; Notable for the following:     Lactic Acid 3.5 (*)     All other components within normal limits   URINALYSIS,CULTURE IF INDICATED - Abnormal; Notable for the following:     Ketones 15 (*)     All other components within normal limits   ESTIMATED GFR - Abnormal; Notable for the following:     GFR If  30 (*)     GFR If Non  25 (*)     All other components within normal limits   LIPASE   LACTIC ACID       Radiology:  CT-ABDOMEN-PELVIS W/O   Final Result         1. Multiple mildly dilated small bowel loops in the mid lower abdomen without obvious transition point. The finding could relate to early partial small bowel obstruction or focal ileus.           ED Medications Administered:  Medications   NS infusion ( Intravenous New Bag 8/19/18 8846)   morphine (pf) 4 mg/ml injection 4 mg (4 mg  Intravenous Given 8/19/18 2312)   ondansetron (ZOFRAN) syringe/vial injection 4 mg (4 mg Intravenous Given 8/19/18 2312)   iohexol (OMNIPAQUE) 240 mg/mL (50 mL Oral Given 8/20/18 0115)       Differential diagnosis:  Small bowel obstruction, gastroenteritis, appendicitis, AAA, mesenteric ischemia    MDM:  History and physical exam as documented above.  Patient presents with acute onset abdominal pain 4 hours prior to arrival it is progressively worsened since that time.  She states symptoms are identical to previous episodes of bowel obstruction.  She is hemodynamically stable on arrival, she is normotensive, has no tachycardia.  At this time I doubt ruptured AAA.  On abdominal exam she has moderate tenderness to palpation without rebound or guarding.    She is very uncomfortable on arrival.  Her pain was treated with morphine, nausea treated with Zofran.  Maintenance fluids initiated in the emergency department as the patient is currently n.p.o. due to inability to tolerate oral fluids, as well as possible that she may require surgical intervention.  On reassessment her pain is improved, she will continue to require IV fluids due to ongoing n.p.o. status on admission.     On laboratory evaluation initial lactic acid is 3.5.  White blood count elevated to 14.0.  She is afebrile, no vital sign abnormalities, at this time I doubt sepsis.  Lipase is within normal limits.  Creatinine is elevated 1.98 consistent with her history of chronic kidney disease, we do not have any recent levels available for comparison.    On laboratory evaluation she does have multiple dilated loops of small bowel with air-fluid levels, no transition point identified.    On my reassessment, she is feeling significantly improved.  She still has some mild abdominal discomfort, repeat dose of morphine given for pain control.    Plan at this time is for admission to hospitalist service for n.p.o. status, continued fluid maintenance, pain control  and nausea control.  She has not had any vomiting in the emergency department, do not believe NG tube is necessary at this time.    Case discussed with Dr. Jones, [facility], who kindly agrees to admit the patient.    Disposition:  Admit to hospitalist in guarded condition    Final Impression:  1. Small bowel obstruction (HCC)        Electronically signed by: Cindy Young, 8/20/2018 5:52 AM

## 2018-08-20 NOTE — ASSESSMENT & PLAN NOTE
- hx of Ovarian Ca s/p hysterectomy, peritoneal chemotherapy and recurrent SBOs  - CT abd.pelv revealing early obstruction v. Ileus  - denied any abd pain or N/V; no NGT for now   - cont NPO, IVFs, with prn analgesics/antiemetics for now; monitor for bowel fx return

## 2018-08-21 VITALS
BODY MASS INDEX: 22.07 KG/M2 | DIASTOLIC BLOOD PRESSURE: 59 MMHG | OXYGEN SATURATION: 95 % | SYSTOLIC BLOOD PRESSURE: 104 MMHG | WEIGHT: 143 LBS | HEART RATE: 61 BPM | RESPIRATION RATE: 17 BRPM | TEMPERATURE: 97.9 F

## 2018-08-21 LAB
ANION GAP SERPL CALC-SCNC: 5 MMOL/L (ref 0–11.9)
BUN SERPL-MCNC: 29 MG/DL (ref 8–22)
CALCIUM SERPL-MCNC: 7.6 MG/DL (ref 8.5–10.5)
CHLORIDE SERPL-SCNC: 114 MMOL/L (ref 96–112)
CO2 SERPL-SCNC: 20 MMOL/L (ref 20–33)
CREAT SERPL-MCNC: 1.17 MG/DL (ref 0.5–1.4)
ERYTHROCYTE [DISTWIDTH] IN BLOOD BY AUTOMATED COUNT: 47.5 FL (ref 35.9–50)
GLUCOSE SERPL-MCNC: 67 MG/DL (ref 65–99)
HCT VFR BLD AUTO: 37.7 % (ref 37–47)
HGB BLD-MCNC: 12.1 G/DL (ref 12–16)
MCH RBC QN AUTO: 30.3 PG (ref 27–33)
MCHC RBC AUTO-ENTMCNC: 32.1 G/DL (ref 33.6–35)
MCV RBC AUTO: 94.5 FL (ref 81.4–97.8)
PLATELET # BLD AUTO: 212 K/UL (ref 164–446)
PMV BLD AUTO: 10 FL (ref 9–12.9)
POTASSIUM SERPL-SCNC: 3.5 MMOL/L (ref 3.6–5.5)
RBC # BLD AUTO: 3.99 M/UL (ref 4.2–5.4)
SODIUM SERPL-SCNC: 139 MMOL/L (ref 135–145)
WBC # BLD AUTO: 4.8 K/UL (ref 4.8–10.8)

## 2018-08-21 PROCEDURE — 700102 HCHG RX REV CODE 250 W/ 637 OVERRIDE(OP): Performed by: HOSPITALIST

## 2018-08-21 PROCEDURE — 36415 COLL VENOUS BLD VENIPUNCTURE: CPT

## 2018-08-21 PROCEDURE — 700111 HCHG RX REV CODE 636 W/ 250 OVERRIDE (IP): Performed by: HOSPITALIST

## 2018-08-21 PROCEDURE — 99239 HOSP IP/OBS DSCHRG MGMT >30: CPT | Performed by: INTERNAL MEDICINE

## 2018-08-21 PROCEDURE — 700102 HCHG RX REV CODE 250 W/ 637 OVERRIDE(OP): Performed by: INTERNAL MEDICINE

## 2018-08-21 PROCEDURE — 80048 BASIC METABOLIC PNL TOTAL CA: CPT

## 2018-08-21 PROCEDURE — 85027 COMPLETE CBC AUTOMATED: CPT

## 2018-08-21 PROCEDURE — A9270 NON-COVERED ITEM OR SERVICE: HCPCS | Performed by: INTERNAL MEDICINE

## 2018-08-21 PROCEDURE — 700105 HCHG RX REV CODE 258: Performed by: HOSPITALIST

## 2018-08-21 PROCEDURE — A9270 NON-COVERED ITEM OR SERVICE: HCPCS | Performed by: HOSPITALIST

## 2018-08-21 RX ORDER — POTASSIUM CHLORIDE 20 MEQ/1
40 TABLET, EXTENDED RELEASE ORAL DAILY
Status: DISCONTINUED | OUTPATIENT
Start: 2018-08-21 | End: 2018-08-21 | Stop reason: HOSPADM

## 2018-08-21 RX ADMIN — POTASSIUM CHLORIDE 40 MEQ: 1500 TABLET, EXTENDED RELEASE ORAL at 10:02

## 2018-08-21 RX ADMIN — ESCITALOPRAM OXALATE 10 MG: 10 TABLET ORAL at 04:48

## 2018-08-21 RX ADMIN — HEPARIN SODIUM 5000 UNITS: 5000 INJECTION, SOLUTION INTRAVENOUS; SUBCUTANEOUS at 04:48

## 2018-08-21 RX ADMIN — SODIUM CHLORIDE: 9 INJECTION, SOLUTION INTRAVENOUS at 03:47

## 2018-08-21 ASSESSMENT — PATIENT HEALTH QUESTIONNAIRE - PHQ9
SUM OF ALL RESPONSES TO PHQ9 QUESTIONS 1 AND 2: 0
2. FEELING DOWN, DEPRESSED, IRRITABLE, OR HOPELESS: NOT AT ALL
1. LITTLE INTEREST OR PLEASURE IN DOING THINGS: NOT AT ALL

## 2018-08-21 ASSESSMENT — PAIN SCALES - GENERAL
PAINLEVEL_OUTOF10: 0
PAINLEVEL_OUTOF10: 0

## 2018-08-21 NOTE — DISCHARGE INSTRUCTIONS
Discharge Instructions    Discharged to home by car with relative. Discharged via wheelchair, hospital escort: Refused.  Special equipment needed: Not Applicable    Be sure to schedule a follow-up appointment with your primary care doctor or any specialists as instructed.     Discharge Plan:   Influenza Vaccine Indication: Patient Refuses    I understand that a diet low in cholesterol, fat, and sodium is recommended for good health. Unless I have been given specific instructions below for another diet, I accept this instruction as my diet prescription.   Other diet: Regular    Special Instructions: None    · Is patient discharged on Warfarin / Coumadin?   No     Depression / Suicide Risk    As you are discharged from this [facility] facility, it is important to learn how to keep safe from harming yourself.    Recognize the warning signs:  · Abrupt changes in personality, positive or negative- including increase in energy   · Giving away possessions  · Change in eating patterns- significant weight changes-  positive or negative  · Change in sleeping patterns- unable to sleep or sleeping all the time   · Unwillingness or inability to communicate  · Depression  · Unusual sadness, discouragement and loneliness  · Talk of wanting to die  · Neglect of personal appearance   · Rebelliousness- reckless behavior  · Withdrawal from people/activities they love  · Confusion- inability to concentrate     If you or a loved one observes any of these behaviors or has concerns about self-harm, here's what you can do:  · Talk about it- your feelings and reasons for harming yourself  · Remove any means that you might use to hurt yourself (examples: pills, rope, extension cords, firearm)  · Get professional help from the community (Mental Health, Substance Abuse, psychological counseling)  · Do not be alone:Call your Safe Contact- someone whom you trust who will be there for you.  · Call your local CRISIS HOTLINE 958-6870 or  666.774.1399  · Call your local Children's Mobile Crisis Response Team [place] (902) 209-3146 or www.Attender  · Call the toll free National Suicide Prevention Hotlines   · National Suicide Prevention Lifeline 132-613-TLWW (0254)  · [employer] Line Network 800-SUICIDE (170-3972)      Small Bowel Obstruction  A small bowel obstruction means that something is blocking the small bowel. The small bowel is also called the small intestine. It is the long tube that connects the stomach to the colon. An obstruction will stop food and fluids from passing through the small bowel. Treatment depends on what is causing the problem and how bad the problem is.  Follow these instructions at home:  · Get a lot of rest.  · Follow your diet as told by your doctor. You may need to:  ¨ Only drink clear liquids until you start to get better.  ¨ Avoid solid foods as told by your doctor.  · Take over-the-counter and prescription medicines only as told by your doctor.  · Keep all follow-up visits as told by your doctor. This is important.  Contact a doctor if:  · You have a fever.  · You have chills.  Get help right away if:  · You have pain or cramps that get worse.  · You throw up (vomit) blood.  · You have a feeling of being sick to your stomach (nausea) that does not go away.  · You cannot stop throwing up.  · You cannot drink fluids.  · You feel confused.  · You feel dry or thirsty (dehydrated).  · Your belly gets more bloated.  · You feel weak or you pass out (faint).  This information is not intended to replace advice given to you by your health care provider. Make sure you discuss any questions you have with your health care provider.  Document Released: 01/25/2006 Document Revised: 08/14/2017 Document Reviewed: 02/11/2016  Elsevier Interactive Patient Education © 2017 [employer]

## 2018-08-21 NOTE — PROGRESS NOTES
"AOX4  SANDOVAL without deficit, numbness, or tingling  Denies pain  Denies N/V. Tolerating clear liquid diet this morning.  Patient had a large, brown, formed BM this AM which led to advancing of diet.  Up self. Tolerating well  POC discussed with out patient. No acute distresses noted otherwise at this time. Bed in lowest and locked position. Call light within reach. Will continue to monitor.    Patient verbalized concern that her medicare wont cover hospitals stays for \"observation\". Social work alerted and working on it.   "

## 2018-08-21 NOTE — CARE PLAN
Problem: Safety  Goal: Will remain free from falls  Outcome: PROGRESSING AS EXPECTED  Patient uses the call light appropriately when needing to get out of bed.

## 2018-08-21 NOTE — DISCHARGE SUMMARY
Discharge Summary    CHIEF COMPLAINT ON ADMISSION  Chief Complaint   Patient presents with   • Vomiting   • Abdominal Pain       Reason for Admission  Stomach Pain      Admission Date  8/19/2018    CODE STATUS  Full Code    HPI & HOSPITAL COURSE  This is a 68 y.o. female here with intractable abdominal pain, nausea, and vomiting due to SBO as noted on initial CT abdomen pelvis upon admission.  Please see H&P for details regarding initial hospital presentation.  In summary, patient has history of Ovarian Cancer s/p hysterectomy, peritoneal chemotherapy, and recurrent SBOs in the past.  Patient was managed conservatively with bowel rest, IVFs, and prn antiemetics/analgesics.  Upon admission, patient denied any nausea or vomiting, and therefore NGT was not placed.  With conservative management, patient had large bowel movement.  Patient's diet was advanced as tolerated with no complications. Patient was also found to have reactive leukocytosis and HERRERA on CKD which resolved with therapies above.  At this time, patient is medically stable for discharge home.        Therefore, she is discharged in fair and stable condition to home with close outpatient follow-up.    The patient met 2-midnight criteria for an inpatient stay at the time of discharge.    Discharge Date  8/21/2018    FOLLOW UP ITEMS POST DISCHARGE  F/U with PCP in 1-2 weeks.    DISCHARGE DIAGNOSES  Principal Problem:    SBO (small bowel obstruction) (HCC) POA: Yes  Active Problems:    Acute on chronic renal insufficiency POA: Yes    Leukocytosis POA: Yes    HTN (hypertension) POA: Yes  Resolved Problems:    * No resolved hospital problems. *      FOLLOW UP  Future Appointments  Date Time Provider Department Center   9/14/2018 10:30 AM [facility] MATILDA QUESADA [facility]   9/18/2018 3:35 PM [facility] MG 2 [address]   9/18/2018 3:45 PM [facility] US 2 [facility] E [address] [place] [address]   11/19/2018 8:30 AM Miriam Church M.D. [facility]   2/12/2019 1:20 PM [facility]     No follow-up provider specified.    MEDICATIONS ON DISCHARGE     Medication List      CONTINUE taking these medications      Instructions   aspirin 81 MG Chew chewable tablet  Commonly known as:  ASA   Take 81 mg by mouth every day.  Dose:  81 mg     atenolol 25 MG Tabs  Commonly known as:  TENORMIN   Take 1 Tab by mouth every evening.  Dose:  25 mg     docosahexanoic acid 1000 MG Caps  Commonly known as:  OMEGA 3 FA   Take 1,000 mg by mouth every morning.  Dose:  1000 mg     escitalopram 10 MG Tabs  Commonly known as:  LEXAPRO   Take 10 mg by mouth every evening.  Dose:  10 mg     PROLIA 60 MG/ML Soln  Generic drug:  denosumab   Inject 60 mg as instructed every 6 months.  Dose:  60 mg     SENIOR MULTIVITAMIN PLUS PO   Take 1 Tab by mouth every morning.  Dose:  1 Tab     SYSTANE OP   Place 1 Drop in both eyes every morning.  Dose:  1 Drop     vitamin D 1000 UNIT Tabs  Commonly known as:  cholecalciferol   Take 2,000 Units by mouth every day.  Dose:  2000 Units            Allergies  Allergies   Allergen Reactions   • Latex Rash   • Tetracycline      Sensitivity to sun; sun burns.  Reaction date; 15 years ago from 2013.       DIET  Orders Placed This Encounter   Procedures   • Diet Order Clear Liquid     Standing Status:   Standing     Number of Occurrences:   1     Order Specific Question:   Diet:     Answer:   Clear Liquid [10]       ACTIVITY  As tolerated.  Weight bearing as tolerated    CONSULTATIONS  NONE    PROCEDURES  NONE    LABORATORY  Lab Results   Component Value Date    SODIUM 139 08/21/2018    POTASSIUM 3.5 (L) 08/21/2018    CHLORIDE 114 (H) 08/21/2018    CO2 20 08/21/2018    GLUCOSE 67 08/21/2018    BUN 29 (H) 08/21/2018    CREATININE 1.17 08/21/2018        Lab Results   Component Value Date    WBC 4.8 08/21/2018    HEMOGLOBIN 12.1 08/21/2018    HEMATOCRIT 37.7 08/21/2018    PLATELETCT 212 08/21/2018        Total time of the discharge process exceeds 44 minutes.

## 2018-08-21 NOTE — CARE PLAN
Problem: Safety  Goal: Will remain free from injury  Outcome: PROGRESSING AS EXPECTED  Educated on fall precautions. Verbalized understanding. Bed low and locked. Call light within reach. Hourly rounding in place.     Problem: Knowledge Deficit  Goal: Knowledge of disease process/condition, treatment plan, diagnostic tests, and medications will improve  Outcome: PROGRESSING AS EXPECTED  Educated patient on medications as they relate to the POC. Patient verbalized understanding.

## 2018-09-12 ENCOUNTER — OUTPATIENT INFUSION SERVICES (OUTPATIENT)
Dept: ONCOLOGY | Facility: MEDICAL CENTER | Age: 68
End: 2018-09-12
Attending: INTERNAL MEDICINE
Payer: MEDICARE

## 2018-09-12 VITALS
TEMPERATURE: 97.8 F | SYSTOLIC BLOOD PRESSURE: 122 MMHG | HEART RATE: 66 BPM | BODY MASS INDEX: 21.66 KG/M2 | DIASTOLIC BLOOD PRESSURE: 79 MMHG | OXYGEN SATURATION: 97 % | HEIGHT: 67 IN | WEIGHT: 138.01 LBS | RESPIRATION RATE: 18 BRPM

## 2018-09-12 LAB
CA-I BLD ISE-SCNC: 1.15 MMOL/L (ref 1.1–1.3)
CREAT BLD-MCNC: 1.2 MG/DL (ref 0.5–1.4)

## 2018-09-12 PROCEDURE — 96401 CHEMO ANTI-NEOPL SQ/IM: CPT

## 2018-09-12 PROCEDURE — 82330 ASSAY OF CALCIUM: CPT

## 2018-09-12 PROCEDURE — 82565 ASSAY OF CREATININE: CPT

## 2018-09-12 PROCEDURE — 700111 HCHG RX REV CODE 636 W/ 250 OVERRIDE (IP): Mod: JG | Performed by: INTERNAL MEDICINE

## 2018-09-12 RX ADMIN — DENOSUMAB 60 MG: 60 INJECTION SUBCUTANEOUS at 14:45

## 2018-09-12 ASSESSMENT — PAIN SCALES - GENERAL: PAINLEVEL_OUTOF10: 0

## 2018-09-12 NOTE — PROGRESS NOTES
Pharmacy Note:    Scr = 1.2    with est crcl ~ 48ml/min  iCalcium =   1.15      Ok to proceed with prolia injection  GREER Calixto PharmDarleneD.

## 2018-09-12 NOTE — PROGRESS NOTES
Mrs Paz into [facility] for a Prolia injection for Osteoporosis.  Pt denied having any new complaints, acute infections, or dental procedures in the last month or scheduled for the next month. RN reviewed medication handout on Prolia with Pt, including potential side effects and S/S of when to follow-up with MD or ED, Pt acknowledged understanding.25G x 3/4 needle used to draw blood from L forearm, bleeding controlled with gauze and coban after. Ionized calcium/creatinine tested, WNL, pharmacist notified that Pt within parameters to treat.  Zena aware to continue taking vitamin D and calcium supplements. Prolia injection given in Left back of arm SQ. Pt tolerated well, band-aid applied to injection site. Future appointments confirmed with Pt prior to leaving, left department appearing in good spirits and NAD.

## 2018-09-18 ENCOUNTER — HOSPITAL ENCOUNTER (OUTPATIENT)
Dept: RADIOLOGY | Facility: MEDICAL CENTER | Age: 68
End: 2018-09-18
Attending: INTERNAL MEDICINE
Payer: MEDICARE

## 2018-09-18 ENCOUNTER — NON-PROVIDER VISIT (OUTPATIENT)
Dept: MEDICAL GROUP | Facility: MEDICAL CENTER | Age: 68
End: 2018-09-18
Payer: MEDICARE

## 2018-09-18 ENCOUNTER — TELEPHONE (OUTPATIENT)
Dept: MEDICAL GROUP | Facility: MEDICAL CENTER | Age: 68
End: 2018-09-18

## 2018-09-18 DIAGNOSIS — R92.30 DENSE BREAST TISSUE: ICD-10-CM

## 2018-09-18 DIAGNOSIS — Z23 NEED FOR VACCINATION: ICD-10-CM

## 2018-09-18 DIAGNOSIS — Z12.31 ENCOUNTER FOR SCREENING MAMMOGRAM FOR BREAST CANCER: ICD-10-CM

## 2018-09-18 PROCEDURE — 76641 ULTRASOUND BREAST COMPLETE: CPT

## 2018-09-18 PROCEDURE — 90662 IIV NO PRSV INCREASED AG IM: CPT | Performed by: INTERNAL MEDICINE

## 2018-09-18 PROCEDURE — G0008 ADMIN INFLUENZA VIRUS VAC: HCPCS | Performed by: INTERNAL MEDICINE

## 2018-09-18 PROCEDURE — 77067 SCR MAMMO BI INCL CAD: CPT

## 2018-09-18 NOTE — PROGRESS NOTES
"Zena Paz is a 68 y.o. female here for a non-provider visit for:   FLU    Reason for immunization: Overdue/Provider Recommended  Immunization records indicate need for vaccine: Yes, confirmed with [facility]  Minimum interval has been met for this vaccine: Yes  ABN completed: Not Indicated    Order and dose verified by: yesenia  VIS Dated  8/7/15 was given to patient: Yes  All IAC Questionnaire questions were answered \"No.\"    Patient tolerated injection and no adverse effects were observed or reported: Yes    Pt scheduled for next dose in series: No    "

## 2018-09-26 ENCOUNTER — PATIENT MESSAGE (OUTPATIENT)
Dept: MEDICAL GROUP | Facility: MEDICAL CENTER | Age: 68
End: 2018-09-26

## 2018-09-28 ENCOUNTER — PATIENT MESSAGE (OUTPATIENT)
Dept: MEDICAL GROUP | Facility: MEDICAL CENTER | Age: 68
End: 2018-09-28

## 2018-10-09 ENCOUNTER — HOSPITAL ENCOUNTER (OUTPATIENT)
Dept: RADIOLOGY | Facility: MEDICAL CENTER | Age: 68
End: 2018-10-09

## 2018-11-19 ENCOUNTER — OFFICE VISIT (OUTPATIENT)
Dept: DERMATOLOGY | Facility: IMAGING CENTER | Age: 68
End: 2018-11-19
Payer: MEDICARE

## 2018-11-19 VITALS — TEMPERATURE: 97.3 F | HEIGHT: 67 IN | BODY MASS INDEX: 21.03 KG/M2 | WEIGHT: 134 LBS

## 2018-11-19 DIAGNOSIS — Z85.828 HISTORY OF BASAL CELL CARCINOMA: ICD-10-CM

## 2018-11-19 DIAGNOSIS — L82.1 SEBORRHEIC KERATOSES: ICD-10-CM

## 2018-11-19 DIAGNOSIS — Z80.8 FAMILY HISTORY OF MELANOMA: ICD-10-CM

## 2018-11-19 DIAGNOSIS — D22.9 MULTIPLE NEVI: ICD-10-CM

## 2018-11-19 DIAGNOSIS — L85.3 XEROSIS CUTIS: ICD-10-CM

## 2018-11-19 PROCEDURE — 99203 OFFICE O/P NEW LOW 30 MIN: CPT | Performed by: DERMATOLOGY

## 2018-11-19 ASSESSMENT — ENCOUNTER SYMPTOMS
CHILLS: 0
FEVER: 0

## 2018-11-19 NOTE — PROGRESS NOTES
Dermatology New Patient Visit    Chief Complaint   Patient presents with   • Establish Care       Subjective:     HPI:   Zena Paz is a 68 y.o. female presenting for    Full skin exam, family history of melanoma.  Has several brown spots over the body, has had some biopsied over the years  No itching/bleeding/pain of any lesions  Last skin exam over a year ago at [facility].    History of skin cancer: Yes, Details: BCC, can't remember where (5+ years ago at least)  History of precancers/actinic keratoses: Yes, Details: yes  History of biopsies:Yes, Details: moles, atypical  History of blistering/severe sunburns:Yes, Details: as a child  Family history of skin cancer:Yes, Details: Father and 3 siblings with melanoma's        Past Medical History:   Diagnosis Date   • Backpain     had back surgery, s1-l5 fusion   • Cancer (HCC)     overian cancer   • Heart murmur    • Hypertension    • Indigestion     sometimes   • Other specified symptom associated with female genital organs     had ca ov the ovary   • Schwannoma of cranial nerve (HCC)        Current Outpatient Prescriptions on File Prior to Visit   Medication Sig Dispense Refill   • Calcium Carb-Cholecalciferol (CALCIUM 1000 + D) 1000-800 MG-UNIT Tab Take  by mouth.     • escitalopram (LEXAPRO) 10 MG Tab Take 10 mg by mouth every evening.     • denosumab (PROLIA) 60 MG/ML Solution Inject 60 mg as instructed every 6 months.     • atenolol (TENORMIN) 25 MG Tab Take 1 Tab by mouth every evening. 10 Tab 0   • vitamin D (CHOLECALCIFEROL) 1000 UNIT TABS Take 2,000 Units by mouth every day.     • aspirin (ASA) 81 MG CHEW Take 81 mg by mouth every day.     • Multiple Vitamins-Minerals (SENIOR MULTIVITAMIN PLUS PO) Take 1 Tab by mouth every morning.     • docosahexanoic acid (OMEGA 3 FA) 1000 MG CAPS Take 1,000 mg by mouth every morning.     • Polyethyl Glycol-Propyl Glycol (SYSTANE OP) Place 1 Drop in both eyes every morning.       No current facility-administered medications  "on file prior to visit.        Allergies   Allergen Reactions   • Latex Rash   • Tetracycline      Sensitivity to sun; sun burns.  Reaction date; 15 years ago from 2013.       Family History   Problem Relation Age of Onset   • Heart Disease Mother    • Stroke Father        Social History     Social History   • Marital status:      Spouse name: N/A   • Number of children: N/A   • Years of education: N/A     Occupational History   • Not on file.     Social History Main Topics   • Smoking status: Never Smoker   • Smokeless tobacco: Never Used   • Alcohol use Yes      Comment: occasional   • Drug use: No   • Sexual activity: Yes     Partners: Male     Birth control/ protection: Post-Menopausal     Other Topics Concern   • Sleep Concern No   • Weight Concern No   • Exercise Yes     Social History Narrative   • No narrative on file       Review of Systems   Constitutional: Negative for chills and fever.   Skin: Negative for itching and rash.   All other systems reviewed and are negative.       Objective:     A full mucocutaneous exam was completed including: scalp, hair, ears, face, eyelids, conjunctiva, lips, gums/tongue/oropharynx, neck, chest breasts, abdomen, back, left and right upper extremities (including hands/digits and fingernails), left and right lower extremities (including feet/toes, toenails), buttocks, including external genitalia with the following pertinent findings listed below. Remaining above-listed examined areas within normal limits / negative for rashes or lesions.    Temperature 36.3 °C (97.3 °F), height 1.702 m (5' 7\"), weight 60.8 kg (134 lb), not currently breastfeeding.    Physical Exam   Constitutional: She is oriented to person, place, and time and well-developed, well-nourished, and in no distress.   HENT:   Head: Normocephalic and atraumatic.       Right Ear: External ear normal.   Left Ear: External ear normal.   Nose: Nose normal.   Mouth/Throat: Oropharynx is clear and moist. "   Eyes: Conjunctivae and lids are normal.   Neck: Normal range of motion. Neck supple.   Cardiovascular: Intact distal pulses.    Pulmonary/Chest: Effort normal.   Neurological: She is alert and oriented to person, place, and time.   Skin: Skin is warm and dry.        Psychiatric: Mood and affect normal.   Vitals reviewed.      DATA: none applicable to review    Assessment and Plan:     1. Multiple nevi  - Benign-appearing nature of lesions discussed. Advised to return to clinic for any new or concerning changes.  - ABCDE's of melanoma discussed    2. Seborrheic keratoses  - Benign-appearing nature of lesions discussed. Advised to return to clinic for any new or concerning changes.    3. History of basal cell carcinoma  Skin cancer education  - discussed importance of sun protective clothing, eyewear  - discussed importance of daily use of broad spectrum sunscreen with SPF 30 or greater, as well as need for reapplication ~every 2 hours when exposed to UVR  - discussed importance of regular self-exams, ideally once per month, every 12 months exams in clinic  - ABCDE's of melanoma discussed  - patient to bring any new or concerning lesions to my attention    4. Family history of melanoma  - skin cancer education/counseling as noted above    5. Xerosis cutis - face/upper lip  Comment: states wind blown/etc from trekking in [place]  - aquaphor, if no improvement rtc     Followup: Return in about 1 year (around 11/19/2019).    Miriam Church M.D.

## 2019-02-26 ENCOUNTER — APPOINTMENT (OUTPATIENT)
Dept: RADIOLOGY | Facility: MEDICAL CENTER | Age: 69
DRG: 390 | End: 2019-02-26
Attending: EMERGENCY MEDICINE
Payer: MEDICARE

## 2019-02-26 ENCOUNTER — HOSPITAL ENCOUNTER (INPATIENT)
Facility: MEDICAL CENTER | Age: 69
LOS: 1 days | DRG: 390 | End: 2019-02-28
Attending: EMERGENCY MEDICINE | Admitting: HOSPITALIST
Payer: MEDICARE

## 2019-02-26 DIAGNOSIS — K56.600 PARTIAL SMALL BOWEL OBSTRUCTION (HCC): ICD-10-CM

## 2019-02-26 LAB
ALBUMIN SERPL BCP-MCNC: 4.9 G/DL (ref 3.2–4.9)
ALBUMIN/GLOB SERPL: 1.8 G/DL
ALP SERPL-CCNC: 26 U/L (ref 30–99)
ALT SERPL-CCNC: 11 U/L (ref 2–50)
ANION GAP SERPL CALC-SCNC: 15 MMOL/L (ref 0–11.9)
AST SERPL-CCNC: 17 U/L (ref 12–45)
BASOPHILS # BLD AUTO: 0.7 % (ref 0–1.8)
BASOPHILS # BLD: 0.1 K/UL (ref 0–0.12)
BILIRUB SERPL-MCNC: 0.5 MG/DL (ref 0.1–1.5)
BUN SERPL-MCNC: 38 MG/DL (ref 8–22)
CALCIUM SERPL-MCNC: 11.1 MG/DL (ref 8.5–10.5)
CHLORIDE SERPL-SCNC: 101 MMOL/L (ref 96–112)
CO2 SERPL-SCNC: 22 MMOL/L (ref 20–33)
CREAT SERPL-MCNC: 1.43 MG/DL (ref 0.5–1.4)
EOSINOPHIL # BLD AUTO: 0.13 K/UL (ref 0–0.51)
EOSINOPHIL NFR BLD: 0.9 % (ref 0–6.9)
ERYTHROCYTE [DISTWIDTH] IN BLOOD BY AUTOMATED COUNT: 45.1 FL (ref 35.9–50)
GLOBULIN SER CALC-MCNC: 2.7 G/DL (ref 1.9–3.5)
GLUCOSE SERPL-MCNC: 133 MG/DL (ref 65–99)
HCT VFR BLD AUTO: 49.2 % (ref 37–47)
HGB BLD-MCNC: 16 G/DL (ref 12–16)
IMM GRANULOCYTES # BLD AUTO: 0.04 K/UL (ref 0–0.11)
IMM GRANULOCYTES NFR BLD AUTO: 0.3 % (ref 0–0.9)
LIPASE SERPL-CCNC: 61 U/L (ref 11–82)
LYMPHOCYTES # BLD AUTO: 2.65 K/UL (ref 1–4.8)
LYMPHOCYTES NFR BLD: 17.5 % (ref 22–41)
MCH RBC QN AUTO: 29.3 PG (ref 27–33)
MCHC RBC AUTO-ENTMCNC: 32.5 G/DL (ref 33.6–35)
MCV RBC AUTO: 89.9 FL (ref 81.4–97.8)
MONOCYTES # BLD AUTO: 0.66 K/UL (ref 0–0.85)
MONOCYTES NFR BLD AUTO: 4.4 % (ref 0–13.4)
NEUTROPHILS # BLD AUTO: 11.58 K/UL (ref 2–7.15)
NEUTROPHILS NFR BLD: 76.2 % (ref 44–72)
NRBC # BLD AUTO: 0 K/UL
NRBC BLD-RTO: 0 /100 WBC
PLATELET # BLD AUTO: 346 K/UL (ref 164–446)
PMV BLD AUTO: 9.8 FL (ref 9–12.9)
POTASSIUM SERPL-SCNC: 4.2 MMOL/L (ref 3.6–5.5)
PROT SERPL-MCNC: 7.6 G/DL (ref 6–8.2)
RBC # BLD AUTO: 5.47 M/UL (ref 4.2–5.4)
SODIUM SERPL-SCNC: 138 MMOL/L (ref 135–145)
WBC # BLD AUTO: 15.2 K/UL (ref 4.8–10.8)

## 2019-02-26 PROCEDURE — 99285 EMERGENCY DEPT VISIT HI MDM: CPT

## 2019-02-26 PROCEDURE — 83690 ASSAY OF LIPASE: CPT

## 2019-02-26 PROCEDURE — 96375 TX/PRO/DX INJ NEW DRUG ADDON: CPT

## 2019-02-26 PROCEDURE — 700105 HCHG RX REV CODE 258: Performed by: EMERGENCY MEDICINE

## 2019-02-26 PROCEDURE — 700111 HCHG RX REV CODE 636 W/ 250 OVERRIDE (IP): Performed by: EMERGENCY MEDICINE

## 2019-02-26 PROCEDURE — 36415 COLL VENOUS BLD VENIPUNCTURE: CPT

## 2019-02-26 PROCEDURE — 96374 THER/PROPH/DIAG INJ IV PUSH: CPT

## 2019-02-26 PROCEDURE — 85025 COMPLETE CBC W/AUTO DIFF WBC: CPT

## 2019-02-26 PROCEDURE — 80053 COMPREHEN METABOLIC PANEL: CPT

## 2019-02-26 RX ORDER — ONDANSETRON 2 MG/ML
4 INJECTION INTRAMUSCULAR; INTRAVENOUS ONCE
Status: COMPLETED | OUTPATIENT
Start: 2019-02-26 | End: 2019-02-26

## 2019-02-26 RX ORDER — MORPHINE SULFATE 4 MG/ML
4 INJECTION, SOLUTION INTRAMUSCULAR; INTRAVENOUS
Status: DISCONTINUED | OUTPATIENT
Start: 2019-02-26 | End: 2019-02-27

## 2019-02-26 RX ORDER — SODIUM CHLORIDE, SODIUM LACTATE, POTASSIUM CHLORIDE, CALCIUM CHLORIDE 600; 310; 30; 20 MG/100ML; MG/100ML; MG/100ML; MG/100ML
1000 INJECTION, SOLUTION INTRAVENOUS ONCE
Status: COMPLETED | OUTPATIENT
Start: 2019-02-26 | End: 2019-02-26

## 2019-02-26 RX ADMIN — ONDANSETRON 4 MG: 2 INJECTION INTRAMUSCULAR; INTRAVENOUS at 22:19

## 2019-02-26 RX ADMIN — SODIUM CHLORIDE, POTASSIUM CHLORIDE, SODIUM LACTATE AND CALCIUM CHLORIDE 1000 ML: 600; 310; 30; 20 INJECTION, SOLUTION INTRAVENOUS at 22:20

## 2019-02-26 RX ADMIN — MORPHINE SULFATE 4 MG: 4 INJECTION INTRAVENOUS at 22:19

## 2019-02-26 ASSESSMENT — LIFESTYLE VARIABLES: DO YOU DRINK ALCOHOL: NO

## 2019-02-27 ENCOUNTER — APPOINTMENT (OUTPATIENT)
Dept: RADIOLOGY | Facility: MEDICAL CENTER | Age: 69
DRG: 390 | End: 2019-02-27
Attending: NURSE PRACTITIONER
Payer: MEDICARE

## 2019-02-27 PROBLEM — K56.600 PARTIAL SMALL BOWEL OBSTRUCTION (HCC): Status: ACTIVE | Noted: 2019-02-27

## 2019-02-27 PROCEDURE — 99223 1ST HOSP IP/OBS HIGH 75: CPT | Mod: AI | Performed by: HOSPITALIST

## 2019-02-27 PROCEDURE — 700102 HCHG RX REV CODE 250 W/ 637 OVERRIDE(OP): Performed by: HOSPITALIST

## 2019-02-27 PROCEDURE — 770006 HCHG ROOM/CARE - MED/SURG/GYN SEMI*

## 2019-02-27 PROCEDURE — 700111 HCHG RX REV CODE 636 W/ 250 OVERRIDE (IP): Performed by: HOSPITALIST

## 2019-02-27 PROCEDURE — 700111 HCHG RX REV CODE 636 W/ 250 OVERRIDE (IP): Performed by: EMERGENCY MEDICINE

## 2019-02-27 PROCEDURE — 700117 HCHG RX CONTRAST REV CODE 255: Performed by: EMERGENCY MEDICINE

## 2019-02-27 PROCEDURE — A9270 NON-COVERED ITEM OR SERVICE: HCPCS | Performed by: HOSPITALIST

## 2019-02-27 PROCEDURE — 74018 RADEX ABDOMEN 1 VIEW: CPT

## 2019-02-27 PROCEDURE — 96376 TX/PRO/DX INJ SAME DRUG ADON: CPT

## 2019-02-27 PROCEDURE — 700105 HCHG RX REV CODE 258: Performed by: HOSPITALIST

## 2019-02-27 PROCEDURE — 74177 CT ABD & PELVIS W/CONTRAST: CPT

## 2019-02-27 RX ORDER — ESCITALOPRAM OXALATE 10 MG/1
10 TABLET ORAL DAILY
COMMUNITY
End: 2019-05-06

## 2019-02-27 RX ORDER — ONDANSETRON 2 MG/ML
4 INJECTION INTRAMUSCULAR; INTRAVENOUS EVERY 4 HOURS PRN
Status: DISCONTINUED | OUTPATIENT
Start: 2019-02-27 | End: 2019-02-28 | Stop reason: HOSPADM

## 2019-02-27 RX ORDER — BISACODYL 10 MG
10 SUPPOSITORY, RECTAL RECTAL
Status: DISCONTINUED | OUTPATIENT
Start: 2019-02-27 | End: 2019-02-27

## 2019-02-27 RX ORDER — ATENOLOL 25 MG/1
25 TABLET ORAL EVERY EVENING
COMMUNITY
End: 2019-05-07

## 2019-02-27 RX ORDER — HEPARIN SODIUM 5000 [USP'U]/ML
5000 INJECTION, SOLUTION INTRAVENOUS; SUBCUTANEOUS EVERY 8 HOURS
Status: DISCONTINUED | OUTPATIENT
Start: 2019-02-27 | End: 2019-02-28 | Stop reason: HOSPADM

## 2019-02-27 RX ORDER — POLYVINYL ALCOHOL 14 MG/ML
1 SOLUTION/ DROPS OPHTHALMIC
Status: DISCONTINUED | OUTPATIENT
Start: 2019-02-27 | End: 2019-02-28 | Stop reason: HOSPADM

## 2019-02-27 RX ORDER — ATENOLOL 50 MG/1
25 TABLET ORAL
Status: DISCONTINUED | OUTPATIENT
Start: 2019-02-27 | End: 2019-02-28 | Stop reason: HOSPADM

## 2019-02-27 RX ORDER — AMOXICILLIN 250 MG
2 CAPSULE ORAL 2 TIMES DAILY
Status: DISCONTINUED | OUTPATIENT
Start: 2019-02-27 | End: 2019-02-27

## 2019-02-27 RX ORDER — OXYCODONE HYDROCHLORIDE 5 MG/1
2.5 TABLET ORAL
Status: DISCONTINUED | OUTPATIENT
Start: 2019-02-27 | End: 2019-02-28 | Stop reason: HOSPADM

## 2019-02-27 RX ORDER — SODIUM CHLORIDE 9 MG/ML
INJECTION, SOLUTION INTRAVENOUS CONTINUOUS
Status: DISCONTINUED | OUTPATIENT
Start: 2019-02-27 | End: 2019-02-28 | Stop reason: HOSPADM

## 2019-02-27 RX ORDER — MORPHINE SULFATE 4 MG/ML
2 INJECTION, SOLUTION INTRAMUSCULAR; INTRAVENOUS
Status: DISCONTINUED | OUTPATIENT
Start: 2019-02-27 | End: 2019-02-28 | Stop reason: HOSPADM

## 2019-02-27 RX ORDER — ESCITALOPRAM OXALATE 10 MG/1
10 TABLET ORAL DAILY
Status: DISCONTINUED | OUTPATIENT
Start: 2019-02-27 | End: 2019-02-28 | Stop reason: HOSPADM

## 2019-02-27 RX ORDER — ONDANSETRON 4 MG/1
4 TABLET, ORALLY DISINTEGRATING ORAL EVERY 4 HOURS PRN
Status: DISCONTINUED | OUTPATIENT
Start: 2019-02-27 | End: 2019-02-28 | Stop reason: HOSPADM

## 2019-02-27 RX ORDER — POLYETHYLENE GLYCOL 3350 17 G/17G
1 POWDER, FOR SOLUTION ORAL
Status: DISCONTINUED | OUTPATIENT
Start: 2019-02-27 | End: 2019-02-27

## 2019-02-27 RX ORDER — OXYCODONE HYDROCHLORIDE 5 MG/1
5 TABLET ORAL
Status: DISCONTINUED | OUTPATIENT
Start: 2019-02-27 | End: 2019-02-28 | Stop reason: HOSPADM

## 2019-02-27 RX ORDER — ACETAMINOPHEN 325 MG/1
650 TABLET ORAL EVERY 6 HOURS PRN
Status: DISCONTINUED | OUTPATIENT
Start: 2019-02-27 | End: 2019-02-28 | Stop reason: HOSPADM

## 2019-02-27 RX ADMIN — MORPHINE SULFATE 4 MG: 4 INJECTION INTRAVENOUS at 00:48

## 2019-02-27 RX ADMIN — IOHEXOL 100 ML: 350 INJECTION, SOLUTION INTRAVENOUS at 00:45

## 2019-02-27 RX ADMIN — SODIUM CHLORIDE: 9 INJECTION, SOLUTION INTRAVENOUS at 02:01

## 2019-02-27 RX ADMIN — HEPARIN SODIUM 5000 UNITS: 5000 INJECTION, SOLUTION INTRAVENOUS; SUBCUTANEOUS at 14:36

## 2019-02-27 RX ADMIN — MINERAL OIL AND WHITE PETROLATUM 1 APPLICATION: 150; 830 OINTMENT OPHTHALMIC at 21:38

## 2019-02-27 RX ADMIN — SODIUM CHLORIDE: 9 INJECTION, SOLUTION INTRAVENOUS at 06:30

## 2019-02-27 RX ADMIN — HEPARIN SODIUM 5000 UNITS: 5000 INJECTION, SOLUTION INTRAVENOUS; SUBCUTANEOUS at 06:21

## 2019-02-27 RX ADMIN — ESCITALOPRAM OXALATE 10 MG: 10 TABLET ORAL at 06:24

## 2019-02-27 RX ADMIN — HEPARIN SODIUM 5000 UNITS: 5000 INJECTION, SOLUTION INTRAVENOUS; SUBCUTANEOUS at 21:08

## 2019-02-27 RX ADMIN — ACETAMINOPHEN 650 MG: 325 TABLET, FILM COATED ORAL at 16:19

## 2019-02-27 RX ADMIN — SODIUM CHLORIDE: 9 INJECTION, SOLUTION INTRAVENOUS at 21:08

## 2019-02-27 ASSESSMENT — ENCOUNTER SYMPTOMS
NAUSEA: 1
DEPRESSION: 0
VOMITING: 0
TINGLING: 0
MYALGIAS: 0
COUGH: 0
DIZZINESS: 0
FEVER: 0
PHOTOPHOBIA: 0
HEADACHES: 0
SHORTNESS OF BREATH: 0
CHILLS: 0
SORE THROAT: 0
DIARRHEA: 0
PALPITATIONS: 0
FOCAL WEAKNESS: 0
WHEEZING: 0
ABDOMINAL PAIN: 1

## 2019-02-27 ASSESSMENT — COGNITIVE AND FUNCTIONAL STATUS - GENERAL
MOBILITY SCORE: 24
SUGGESTED CMS G CODE MODIFIER DAILY ACTIVITY: CH
SUGGESTED CMS G CODE MODIFIER MOBILITY: CH
DAILY ACTIVITIY SCORE: 24

## 2019-02-27 ASSESSMENT — COPD QUESTIONNAIRES
IN THE PAST 12 MONTHS DO YOU DO LESS THAN YOU USED TO BECAUSE OF YOUR BREATHING PROBLEMS: DISAGREE/UNSURE
DO YOU EVER COUGH UP ANY MUCUS OR PHLEGM?: NO/ONLY WITH OCCASIONAL COLDS OR INFECTIONS
DURING THE PAST 4 WEEKS HOW MUCH DID YOU FEEL SHORT OF BREATH: NONE/LITTLE OF THE TIME
HAVE YOU SMOKED AT LEAST 100 CIGARETTES IN YOUR ENTIRE LIFE: NO/DON'T KNOW
COPD SCREENING SCORE: 2

## 2019-02-27 ASSESSMENT — PATIENT HEALTH QUESTIONNAIRE - PHQ9
SUM OF ALL RESPONSES TO PHQ9 QUESTIONS 1 AND 2: 0
1. LITTLE INTEREST OR PLEASURE IN DOING THINGS: NOT AT ALL
2. FEELING DOWN, DEPRESSED, IRRITABLE, OR HOPELESS: NOT AT ALL

## 2019-02-27 ASSESSMENT — LIFESTYLE VARIABLES
EVER_SMOKED: NEVER
ALCOHOL_USE: NO

## 2019-02-27 NOTE — PROGRESS NOTES
Patient alert and oriented x 4.  Arrived on unit via bed.  2 RNs skin assessment completed.  Skin intact.  Patient denied pain.

## 2019-02-27 NOTE — PROGRESS NOTES
Pt AAOx4.  No c/o pain this morning.  Pt stating that pain and nausea have both subsided.  LBM 2/27, pt had diarrhea upon transfer to unit.  + flatus, + void.  NPO at this time, awaiting update on POC.  POC reviewed with pt.  Call light within reach, pt educated to call for assistance as needed.  Hourly rounding in place.

## 2019-02-27 NOTE — ED NOTES
Called report [facility] RN. Pt is aware of POC. Pt belongings are on bed. All pt care responsibilities relinquished.

## 2019-02-27 NOTE — ED NOTES
Received report from Kait Aguirre. Pt care responsibilities assumed. Pt resting in bed, Monitors in place, VSS, will continue to monitor.

## 2019-02-27 NOTE — CARE PLAN
Problem: Communication  Goal: The ability to communicate needs accurately and effectively will improve    Intervention: [place] patient and significant other/support system to call light to alert staff of needs  Patient oriented to location of bathroom call light and bedside controls. Patient verbalized understanding.      Problem: Safety  Goal: Will remain free from falls  Outcome: PROGRESSING AS EXPECTED  Patient oriented to new environment. Call light within reach. Bed in lowest and locked position.

## 2019-02-27 NOTE — ED TRIAGE NOTES
"Zena Paz  68 y.o.  Chief Complaint   Patient presents with   • Abdominal Pain     generalized 10/10, sudden onset     Patient to ED via wheelchair for above accompanied by .    States \"I have a bowel obstruction. I've had two before and this feels exactly the same.\"    Complaining of concurrent nausea, denies vomiting.    Moaning, grimacing, and guarding in triage.    Charge BROOK Topete notified of patient.    Patient roomed immediately from triage to [facility] via wheelchair accompanied by ED Tech and .  "

## 2019-02-27 NOTE — ASSESSMENT & PLAN NOTE
Current CT scan is consistent with an early or a partial small bowel obstruction.  Fortunately, her symptoms have been controlled in the emergency room therefore there is no need for NG tube placement at this time.  The patient does have known history of small bowel obstructions with previous abdominal surgeries.  She will be admitted to the hospital and kept n.p.o. while we await return of bowel function.  I will place her on IV fluid hydration and she will receive symptomatic management for nausea and pain.  I will plan to follow-up with a repeat KUB in the morning.  If she has any return of her symptoms or worsening, then I will have low tolerance for placing an NG tube to low wall suction and potential surgical consultation.

## 2019-02-27 NOTE — PROGRESS NOTES
Patient admitted after midnight. Patient is a 71 year old female with history of recurrent SBO related to multiple abdominal surgeries including hysterectomy and ovarian cancer, currently in remission. Patient admitted with severe abdominal pain that has now resolved. Patient states symptoms have dissipated and she has successfully had 2 BM's since floor admission. Patient's abdomen is soft and non tender with active bowel sounds. Will repeat KUB and advance diet as tolerated if negative.  Vital signs remain negative and patient is afebrile. Patient did have noted WBC elevation of 15.2 likely secondary to pain and stress, however we will trend CBC and watch closely for signs of infection.

## 2019-02-27 NOTE — ED NOTES
Pt to triage via wheelchair.  Agree with triage assessment.  Pt changed into gown.  Chart up for ERP.

## 2019-02-27 NOTE — ED PROVIDER NOTES
ED Provider Note    ER PROVIDER NOTE        CHIEF COMPLAINT  Chief Complaint   Patient presents with   • Abdominal Pain     generalized 10/10, sudden onset       HPI  Zena Paz is a 68 y.o. female who presents to the emergency department complaining of abdominal pain and vomiting.  Patient has history of multiple bowel obstructions in the past, multiple bowel surgeries due to ovarian cancer that were treated at [facility], no surgeries in the last 3 years.  Patient reports abrupt onset of a sharp and crampy central abdominal pain that has worsened through the evening as well as nausea with one episode of emesis on arrival here.  When her pain started she had a bowel movement but none since.  No blood in her stool.  She denies any urinary symptoms, no fevers or chills, no chest pain or difficulty breathing    REVIEW OF SYSTEMS  Pertinent positives include abdominal pain, vomiting. Pertinent negatives include no fever. See HPI for details. All other systems reviewed and are negative.    PAST MEDICAL HISTORY   has a past medical history of Backpain; Bowel obstruction (HCC); Cancer (HCC); Heart murmur; Hypertension; Indigestion; Other specified symptom associated with female genital organs; and Schwannoma of cranial nerve (HCC).    SURGICAL HISTORY   has a past surgical history that includes gyn surgery; hysterectomy radical; other orthopedic surgery; and lumbar fusion posterior.    FAMILY HISTORY  Family History   Problem Relation Age of Onset   • Heart Disease Mother    • Stroke Father        SOCIAL HISTORY  Social History     Social History   • Marital status:      Spouse name: N/A   • Number of children: N/A   • Years of education: N/A     Social History Main Topics   • Smoking status: Never Smoker   • Smokeless tobacco: Never Used   • Alcohol use Yes      Comment: occasional   • Drug use: No   • Sexual activity: Yes     Partners: Male     Birth control/ protection: Post-Menopausal     Other Topics Concern   •  "Sleep Concern No   • Weight Concern No   • Exercise Yes     Social History Narrative   • No narrative on file      History   Drug Use No       CURRENT MEDICATIONS  Home Medications     Reviewed by Sharon Saldana R.N. (Registered Nurse) on 02/26/19 at 2123  Med List Status: Partial   Medication Last Dose Status   aspirin (ASA) 81 MG CHEW  Active   atenolol (TENORMIN) 25 MG Tab  Active   Calcium Carb-Cholecalciferol (CALCIUM 1000 + D) 1000-800 MG-UNIT Tab  Active   denosumab (PROLIA) 60 MG/ML Solution  Active   docosahexanoic acid (OMEGA 3 FA) 1000 MG CAPS  Active   escitalopram (LEXAPRO) 10 MG Tab  Active   Multiple Vitamins-Minerals (SENIOR MULTIVITAMIN PLUS PO)  Active   Polyethyl Glycol-Propyl Glycol (SYSTANE OP)  Active   vitamin D (CHOLECALCIFEROL) 1000 UNIT TABS  Active                ALLERGIES  Allergies   Allergen Reactions   • Latex Rash   • Tetracycline      Sensitivity to sun; sun burns.  Reaction date; 15 years ago from 2013.       PHYSICAL EXAM  VITAL SIGNS: BP (!) 99/59   Pulse 61   Temp 36 °C (96.8 °F) (Temporal)   Resp 20   Ht 1.715 m (5' 7.5\")   Wt 63.5 kg (140 lb)   SpO2 100%   BMI 21.60 kg/m²   Pulse ox interpretation: I interpret this pulse ox as normal.    Constitutional: Alert, uncomfortable  HENT: No signs of trauma, Bilateral external ears normal, Nose normal.   Eyes: Pupils are equal and reactive, Conjunctiva normal, Non-icteric.   Neck: Normal range of motion, No tenderness, Supple, No stridor.   Lymphatic: No lymphadenopathy noted.   Cardiovascular: Regular rate and rhythm, no murmurs.   Thorax & Lungs: Normal breath sounds, No respiratory distress, No wheezing, No chest tenderness.   Abdomen: Bowel sounds diminished, Soft, moderate periumbilical and suprapubic tenderness tenderness, No masses, No pulsatile masses. No peritoneal signs.  Skin: Warm, Dry, No erythema, No rash.   Back: No bony tenderness, No CVA tenderness.   Extremities: Intact distal pulses, No edema, No tenderness, " No cyanosis, Negative Nic's sign.  Musculoskeletal: Good range of motion in all major joints. No tenderness to palpation or major deformities noted.   Neurologic: Alert , Normal motor function, Normal sensory function, No focal deficits noted.   Psychiatric: Affect normal, Judgment normal, Mood normal.     DIAGNOSTIC STUDIES / PROCEDURES      LABS  Labs Reviewed   CBC WITH DIFFERENTIAL - Abnormal; Notable for the following:        Result Value    WBC 15.2 (*)     RBC 5.47 (*)     Hematocrit 49.2 (*)     MCHC 32.5 (*)     Neutrophils-Polys 76.20 (*)     Lymphocytes 17.50 (*)     Neutrophils (Absolute) 11.58 (*)     All other components within normal limits   COMP METABOLIC PANEL - Abnormal; Notable for the following:     Anion Gap 15.0 (*)     Glucose 133 (*)     Bun 38 (*)     Creatinine 1.43 (*)     Calcium 11.1 (*)     Alkaline Phosphatase 26 (*)     All other components within normal limits   ESTIMATED GFR - Abnormal; Notable for the following:     GFR If  44 (*)     GFR If Non  37 (*)     All other components within normal limits   LIPASE       All labs reviewed by me.    RADIOLOGY  CT-ABDOMEN-PELVIS WITH   Final Result      1.  Findings consistent with early or partial distal small bowel obstruction.   2.  No evidence for perforation.   3.  No focal mesenteric inflammatory changes.        The radiologist's interpretation of all radiological studies have been reviewed by me.    COURSE & MEDICAL DECISION MAKING  Nursing notes, VS, PMSFHx reviewed in chart.    9:40 PM Patient seen and examined at bedside. Patient will be treated with morphine, Zofran. Ordered for labs, CT to evaluate her symptoms.     Patient reevaluated she has had some improvement in her nausea, still some pain, additional morphine ordered    Patient reevaluated again, discussed results, she is still having some slight nausea and pain, will plan for admission    HYDRATION: Based on the patient's presentation of  Acute Vomiting and Inability to take oral fluids the patient was given IV fluids. IV Hydration was used because oral hydration failed due to Patient's bowel obstruction. Upon recheck following hydration, the patient was Improved.    Decision Making:  This is a 68 y.o. female presenting with abdominal pain nausea and emesis.  Here she does have evidence of early bowel obstruction, partial.  There is no evidence of perforation or other acute surgical process at this time however given her symptoms as well as multiple abdominal surgeries with high risk for full obstruction she will be admitted for further care      Patient is admitted in stable condition    FINAL IMPRESSION  1. Partial small bowel obstruction (HCC)          The note accurately reflects work and decisions made by me.  Jevon Terrazas  2/27/2019  2:08 AM

## 2019-02-27 NOTE — ED NOTES
Patient resting on gurney, respirations even and unlabored.  Patient updated on POC, awaiting CT.   at bedside.

## 2019-02-27 NOTE — H&P
Hospital Medicine History & Physical Note    Date of Service  2/27/2019    Primary Care Physician  Tammy Garza    Consultants  None    Code Status  Full    Chief Complaint  Chief Complaint   Patient presents with   • Abdominal Pain     generalized 10/10, sudden onset       History of Presenting Illness  68 y.o. female who presented on 2/26/2019 with abdominal pain.  This is a very pleasant female who is known to me from previous hospital admissions and carries a history of recurrent small bowel obstructions with a history of multiple abdominal surgeries including hysterectomy and previous ovarian cancer now in remission status post peritoneal chemotherapy.  The patient reports that her symptoms began around 3:00 this afternoon with a diffuse abdominal pain.  At around 8:30 PM, she had a normal formed bowel movement and then went to [employer] practice.  During her time there, her symptoms returned with diffuse abdominal discomfort and nausea which gradually worsened.  She denies any radiation of her abdominal discomfort, no alleviating or aggravating factors.  Prior to this, she was in her usual state of health with no fevers, chills, headache, chest pain, or shortness of breath.  Upon assessment in the emergency room, CT scan of the abdomen shows a partial small bowel obstruction versus early SBO.    Review of Systems  Review of Systems   Constitutional: Negative for chills and fever.   HENT: Negative for congestion and sore throat.    Eyes: Negative for photophobia.   Respiratory: Negative for cough, shortness of breath and wheezing.    Cardiovascular: Negative for chest pain and palpitations.   Gastrointestinal: Positive for abdominal pain and nausea. Negative for diarrhea and vomiting.   Genitourinary: Negative for dysuria.   Musculoskeletal: Negative for myalgias.   Skin: Negative.    Neurological: Negative for dizziness, tingling, focal weakness and headaches.   Psychiatric/Behavioral: Negative for depression  and suicidal ideas.       Past Medical History  Past Medical History:   Diagnosis Date   • Backpain     had back surgery, s1-l5 fusion   • Bowel obstruction (HCC)    • Cancer (HCC)     overian cancer   • Heart murmur    • Hypertension    • Indigestion     sometimes   • Other specified symptom associated with female genital organs     had ca ov the ovary   • Schwannoma of cranial nerve (HCC)        Surgical History  Past Surgical History:   Procedure Laterality Date   • GYN SURGERY     • HYSTERECTOMY RADICAL     • LUMBAR FUSION POSTERIOR     • OTHER ORTHOPEDIC SURGERY      had back surgery       Family History  Family History   Problem Relation Age of Onset   • Heart Disease Mother    • Stroke Father        Social History  Social History   Substance Use Topics   • Smoking status: Never Smoker   • Smokeless tobacco: Never Used   • Alcohol use Yes      Comment: occasional       Allergies  Allergies   Allergen Reactions   • Latex Rash   • Tetracycline      Sensitivity to sun; sun burns.  Reaction date; 15 years ago from 2013.       Medications  No current facility-administered medications on file prior to encounter.      Current Outpatient Prescriptions on File Prior to Encounter   Medication Sig Dispense Refill   • escitalopram (LEXAPRO) 10 MG Tab TAKE 1 TABLET BY MOUTH EVERY MORNING 90 Tab 1   • atenolol (TENORMIN) 25 MG Tab TAKE 1 TABLET BY MOUTH EVERY EVENING 90 Tab 1   • Calcium Carb-Cholecalciferol (CALCIUM 1000 + D) 1000-800 MG-UNIT Tab Take  by mouth.     • denosumab (PROLIA) 60 MG/ML Solution Inject 60 mg as instructed every 6 months.     • vitamin D (CHOLECALCIFEROL) 1000 UNIT TABS Take 2,000 Units by mouth every day.     • aspirin (ASA) 81 MG CHEW Take 81 mg by mouth every day.     • Multiple Vitamins-Minerals (SENIOR MULTIVITAMIN PLUS PO) Take 1 Tab by mouth every morning.     • docosahexanoic acid (OMEGA 3 FA) 1000 MG CAPS Take 1,000 mg by mouth every morning.     • Polyethyl Glycol-Propyl Glycol (SYSTANE  OP) Place 1 Drop in both eyes every morning.         Physical Exam  Hemodynamics  Temp (24hrs), Av °C (96.8 °F), Min:36 °C (96.8 °F), Max:36 °C (96.8 °F)   Temperature: 36 °C (96.8 °F)  Pulse  Av.5  Min: 53  Max: 66 Heart Rate (Monitored): 67  Blood Pressure : (!) 99/59, NIBP: 110/62      Respiratory      Respiration: 20, Pulse Oximetry: 100 %             Physical Exam   Constitutional: She is oriented to person, place, and time. No distress.   HENT:   Head: Normocephalic and atraumatic.   Right Ear: External ear normal.   Left Ear: External ear normal.   Eyes: EOM are normal. Right eye exhibits no discharge. Left eye exhibits no discharge.   Neck: Neck supple. No JVD present.   Cardiovascular: Normal rate, regular rhythm and normal heart sounds.    Pulmonary/Chest: Effort normal and breath sounds normal. No respiratory distress. She exhibits no tenderness.   Abdominal: Soft. She exhibits no distension. There is no tenderness.   Hypoactive bowel sounds   Musculoskeletal: Normal range of motion. She exhibits no edema.   Neurological: She is alert and oriented to person, place, and time. No cranial nerve deficit.   Skin: Skin is warm and dry. She is not diaphoretic. No erythema.   Psychiatric: She has a normal mood and affect. Her behavior is normal.   Nursing note and vitals reviewed.    Capillary refill less than 3 seconds, distal pulses intact    Laboratory:  Recent Labs      19   WBC  15.2*   RBC  5.47*   HEMOGLOBIN  16.0   HEMATOCRIT  49.2*   MCV  89.9   MCH  29.3   MCHC  32.5*   RDW  45.1   PLATELETCT  346   MPV  9.8     Recent Labs      19   SODIUM  138   POTASSIUM  4.2   CHLORIDE  101   CO2  22   GLUCOSE  133*   BUN  38*   CREATININE  1.43*   CALCIUM  11.1*     Recent Labs      19   ALTSGPT  11   ASTSGOT  17   ALKPHOSPHAT  26*   TBILIRUBIN  0.5   LIPASE  61   GLUCOSE  133*                 No results found for: TROPONINI    Imaging  Ct-abdomen-pelvis  With    Result Date: 2/27/2019 2/26/2019 11:59 PM HISTORY/REASON FOR EXAM:  Abd distension; Pain. TECHNIQUE/EXAM DESCRIPTION:   CT scan of the abdomen and pelvis with contrast. Contrast-enhanced helical scanning was obtained from the diaphragmatic domes through the pubic symphysis following the bolus administration of nonionic contrast without complication. 100 mL of Omnipaque 350 nonionic contrast was administered without complication. Low dose optimization technique was utilized for this CT exam including automated exposure control and adjustment of the mA and/or kV according to patient size. COMPARISON: 8/20/2018 FINDINGS: CT Abdomen: Visualized lung bases show mild dependent atelectasis. The liver is unremarkable. The spleen is unremarkable. Adrenal glands are unremarkable. RIGHT kidney shows scarring at the lower pole. LEFT kidney is unremarkable. The pancreas is unremarkable. The gallbladder is unremarkable. CT Pelvis: Bladder is unremarkable. Multiple dilated fluid-filled small bowel loops in the mid and lower abdomen.  Distal ileum is decompressed.  Apparent transition point in the central pelvis. Appendix is not visualized. No peritoneal fluid or pneumoperitoneum. Abdominal aorta is tortuous with mild atherosclerotic changes. Postoperative change of lumbar spine.     1.  Findings consistent with early or partial distal small bowel obstruction. 2.  No evidence for perforation. 3.  No focal mesenteric inflammatory changes.        Assessment/Plan:  Anticipate that patient will need greater than 2 midnights for management of the discussed medical issues.    * Partial small bowel obstruction (HCC)   Assessment & Plan    Current CT scan is consistent with an early or a partial small bowel obstruction.  Fortunately, her symptoms have been controlled in the emergency room therefore there is no need for NG tube placement at this time.  The patient does have known history of small bowel obstructions with previous  abdominal surgeries.  She will be admitted to the hospital and kept n.p.o. while we await return of bowel function.  I will place her on IV fluid hydration and she will receive symptomatic management for nausea and pain.  I will plan to follow-up with a repeat KUB in the morning.  If she has any return of her symptoms or worsening, then I will have low tolerance for placing an NG tube to low wall suction and potential surgical consultation.     HTN (hypertension)- (present on admission)   Assessment & Plan    Blood pressures currently well controlled on home atenolol, okay to continue.         Prophylaxis: Heparin for DVT prophylaxis, no PPI indicated, bowel protocol as needed

## 2019-02-28 ENCOUNTER — PATIENT MESSAGE (OUTPATIENT)
Dept: MEDICAL GROUP | Facility: MEDICAL CENTER | Age: 69
End: 2019-02-28

## 2019-02-28 ENCOUNTER — PATIENT OUTREACH (OUTPATIENT)
Dept: HEALTH INFORMATION MANAGEMENT | Facility: OTHER | Age: 69
End: 2019-02-28

## 2019-02-28 VITALS
HEIGHT: 68 IN | BODY MASS INDEX: 21.02 KG/M2 | RESPIRATION RATE: 15 BRPM | HEART RATE: 58 BPM | SYSTOLIC BLOOD PRESSURE: 115 MMHG | OXYGEN SATURATION: 96 % | WEIGHT: 138.67 LBS | TEMPERATURE: 97 F | DIASTOLIC BLOOD PRESSURE: 72 MMHG

## 2019-02-28 DIAGNOSIS — R79.89 ELEVATED PTHRP LEVEL: ICD-10-CM

## 2019-02-28 PROBLEM — K56.600 PARTIAL SMALL BOWEL OBSTRUCTION (HCC): Status: RESOLVED | Noted: 2019-02-27 | Resolved: 2019-02-28

## 2019-02-28 LAB
ALBUMIN SERPL BCP-MCNC: 3.3 G/DL (ref 3.2–4.9)
ALBUMIN/GLOB SERPL: 1.8 G/DL
ALP SERPL-CCNC: 20 U/L (ref 30–99)
ALT SERPL-CCNC: 6 U/L (ref 2–50)
ANION GAP SERPL CALC-SCNC: 6 MMOL/L (ref 0–11.9)
AST SERPL-CCNC: 13 U/L (ref 12–45)
BILIRUB SERPL-MCNC: 0.7 MG/DL (ref 0.1–1.5)
BUN SERPL-MCNC: 21 MG/DL (ref 8–22)
CALCIUM SERPL-MCNC: 8.5 MG/DL (ref 8.5–10.5)
CHLORIDE SERPL-SCNC: 111 MMOL/L (ref 96–112)
CO2 SERPL-SCNC: 23 MMOL/L (ref 20–33)
CREAT SERPL-MCNC: 1.11 MG/DL (ref 0.5–1.4)
ERYTHROCYTE [DISTWIDTH] IN BLOOD BY AUTOMATED COUNT: 48.2 FL (ref 35.9–50)
GLOBULIN SER CALC-MCNC: 1.8 G/DL (ref 1.9–3.5)
GLUCOSE SERPL-MCNC: 84 MG/DL (ref 65–99)
HCT VFR BLD AUTO: 38.5 % (ref 37–47)
HGB BLD-MCNC: 12.3 G/DL (ref 12–16)
MCH RBC QN AUTO: 29.7 PG (ref 27–33)
MCHC RBC AUTO-ENTMCNC: 31.9 G/DL (ref 33.6–35)
MCV RBC AUTO: 93.2 FL (ref 81.4–97.8)
PLATELET # BLD AUTO: 237 K/UL (ref 164–446)
PMV BLD AUTO: 9.9 FL (ref 9–12.9)
POTASSIUM SERPL-SCNC: 3.8 MMOL/L (ref 3.6–5.5)
PROT SERPL-MCNC: 5.1 G/DL (ref 6–8.2)
RBC # BLD AUTO: 4.11 M/UL (ref 4.2–5.4)
SODIUM SERPL-SCNC: 140 MMOL/L (ref 135–145)
WBC # BLD AUTO: 4 K/UL (ref 4.8–10.8)

## 2019-02-28 PROCEDURE — 99239 HOSP IP/OBS DSCHRG MGMT >30: CPT | Performed by: HOSPITALIST

## 2019-02-28 PROCEDURE — 700111 HCHG RX REV CODE 636 W/ 250 OVERRIDE (IP): Performed by: HOSPITALIST

## 2019-02-28 PROCEDURE — A9270 NON-COVERED ITEM OR SERVICE: HCPCS | Performed by: HOSPITALIST

## 2019-02-28 PROCEDURE — 700105 HCHG RX REV CODE 258: Performed by: HOSPITALIST

## 2019-02-28 PROCEDURE — 85027 COMPLETE CBC AUTOMATED: CPT

## 2019-02-28 PROCEDURE — 700102 HCHG RX REV CODE 250 W/ 637 OVERRIDE(OP): Performed by: HOSPITALIST

## 2019-02-28 PROCEDURE — 80053 COMPREHEN METABOLIC PANEL: CPT

## 2019-02-28 PROCEDURE — 36415 COLL VENOUS BLD VENIPUNCTURE: CPT

## 2019-02-28 RX ADMIN — SODIUM CHLORIDE: 9 INJECTION, SOLUTION INTRAVENOUS at 05:26

## 2019-02-28 RX ADMIN — ACETAMINOPHEN 650 MG: 325 TABLET, FILM COATED ORAL at 01:14

## 2019-02-28 RX ADMIN — HEPARIN SODIUM 5000 UNITS: 5000 INJECTION, SOLUTION INTRAVENOUS; SUBCUTANEOUS at 05:22

## 2019-02-28 RX ADMIN — ESCITALOPRAM OXALATE 10 MG: 10 TABLET ORAL at 05:22

## 2019-02-28 NOTE — PROGRESS NOTES
Received order for discharge.  Discharge instructions reviewed with pt.  All questions answered.  PIV removed.  All belongings gathered.  Pt escorted off unit with staff to discharge home with .

## 2019-02-28 NOTE — PROGRESS NOTES
Med Rec Updated and Complete per Pt at bedside  Allergies Reviewed  No PO ABX last 30 days.    Pt receives Prolia injections every 6 months from [facility].    Pt taking LD ASA daily.    Revisions were made to the Med Rec regarding the following medications:    LD ASA, Calcium + D, Calcium, Systane Gel, and Systane Eye Drops.    PharmD notified.

## 2019-02-28 NOTE — DISCHARGE INSTRUCTIONS
Discharge Instructions    Discharged to home by car with relative. Discharged via wheelchair, hospital escort: Yes.  Special equipment needed: Not Applicable    Be sure to schedule a follow-up appointment with your primary care doctor or any specialists as instructed.     Discharge Plan:   Diet Plan: Discussed  Activity Level: Discussed  Confirmed Follow up Appointment: Patient to Call and Schedule Appointment  Confirmed Symptoms Management: Discussed  Medication Reconciliation Updated: Yes  Pneumococcal Vaccine Administered/Refused: Not given - Patient refused pneumococcal vaccine  Influenza Vaccine Indication: Not indicated: Previously immunized this influenza season and > 8 years of age    I understand that a diet low in cholesterol, fat, and sodium is recommended for good health. Unless I have been given specific instructions below for another diet, I accept this instruction as my diet prescription.   Other diet: Renal diet as tolerated.    Special Instructions:   Return to ER for any worsening of symptoms.  Follow up with PCP.     · Is patient discharged on Warfarin / Coumadin?   No       Small Bowel Obstruction  A small bowel obstruction means that something is blocking the small bowel. The small bowel is also called the small intestine. It is the long tube that connects the stomach to the colon. An obstruction will stop food and fluids from passing through the small bowel. Treatment depends on what is causing the problem and how bad the problem is.  Follow these instructions at home:  · Get a lot of rest.  · Follow your diet as told by your doctor. You may need to:  ¨ Only drink clear liquids until you start to get better.  ¨ Avoid solid foods as told by your doctor.  · Take over-the-counter and prescription medicines only as told by your doctor.  · Keep all follow-up visits as told by your doctor. This is important.  Contact a doctor if:  · You have a fever.  · You have chills.  Get help right away  if:  · You have pain or cramps that get worse.  · You throw up (vomit) blood.  · You have a feeling of being sick to your stomach (nausea) that does not go away.  · You cannot stop throwing up.  · You cannot drink fluids.  · You feel confused.  · You feel dry or thirsty (dehydrated).  · Your belly gets more bloated.  · You feel weak or you pass out (faint).  This information is not intended to replace advice given to you by your health care provider. Make sure you discuss any questions you have with your health care provider.  Document Released: 01/25/2006 Document Revised: 08/14/2017 Document Reviewed: 02/11/2016  [employer] Patient Education © 2017 [employer]    Depression / Suicide Risk    As you are discharged from this [facility] facility, it is important to learn how to keep safe from harming yourself.    Recognize the warning signs:  · Abrupt changes in personality, positive or negative- including increase in energy   · Giving away possessions  · Change in eating patterns- significant weight changes-  positive or negative  · Change in sleeping patterns- unable to sleep or sleeping all the time   · Unwillingness or inability to communicate  · Depression  · Unusual sadness, discouragement and loneliness  · Talk of wanting to die  · Neglect of personal appearance   · Rebelliousness- reckless behavior  · Withdrawal from people/activities they love  · Confusion- inability to concentrate     If you or a loved one observes any of these behaviors or has concerns about self-harm, here's what you can do:  · Talk about it- your feelings and reasons for harming yourself  · Remove any means that you might use to hurt yourself (examples: pills, rope, extension cords, firearm)  · Get professional help from the community (Mental Health, Substance Abuse, psychological counseling)  · Do not be alone:Call your Safe Contact- someone whom you trust who will be there for you.  · Call your local CRISIS HOTLINE  196-7693 or 147-386-1058  · Call your local Children's Mobile Crisis Response Team [place] (965) 005-7032 or www.[facility]  · Call the toll free National Suicide Prevention Hotlines   · National Suicide Prevention Lifeline 592-953-HSEN (5089)  · National ActiveGift Line Network 800-SUICIDE (360-6358)

## 2019-02-28 NOTE — PROGRESS NOTES
Pt AAOx4.  No c/o pain this morning.  Pt stating she had no pain or nausea over night.   LBM 2/27, + flatus, + void.  Clear liquid, no red diet in place.   POC reviewed with pt.  Call light within reach, pt educated to call for assistance as needed.  Hourly rounding in place.

## 2019-02-28 NOTE — CARE PLAN
Problem: Communication  Goal: The ability to communicate needs accurately and effectively will improve  Outcome: PROGRESSING AS EXPECTED  POC reviewed with pt. All questions answered at this time.     Problem: Pain Management  Goal: Pain level will decrease to patient's comfort goal  Outcome: PROGRESSING AS EXPECTED  Pt stating that she has no pain. Pt educated on regimen for return of pain.

## 2019-02-28 NOTE — DISCHARGE SUMMARY
Discharge Summary    CHIEF COMPLAINT ON ADMISSION  Chief Complaint   Patient presents with   • Abdominal Pain     generalized 10/10, sudden onset       Reason for Admission  Bowel Problem     Admission Date  2/26/2019    CODE STATUS  Full Code    HPI & HOSPITAL COURSE  This is a 68 y.o. female here with abdominal pain.  Patient carries significant medical history of recurrent small bowel obstructions related to history of multiple abdominal surgeries including hysterectomy and previous ovarian cancer now in remission status post peritoneal chemotherapy.  Patient had stated that her symptoms have begun around 3:00 on the afternoon of admission and started with diffuse abdominal pain that gradually increased to sharp discomfort and nausea that prompted her to present to the emergency room for further evaluation.  Upon assessment in the emergency room CT scan of the abdomen shows partial bowel obstruction versus early small bowel obstruction.  Patient was admitted to general surgical unit.  Patient was initiated on IV fluid hydration and symptom management with antinausea and analgesic medication.  Patient was kept n.p.o. while awaiting return of bowel function.  On admission to medical floor patient proceeded to have 2 bowel movements and cessation of symptoms that had originally brought her into the emergency room.  Patient was observed overnight and KUB imaging was obtained which showed resolution of small bowel obstruction.  Patient's diet was advanced as tolerated and patient states she is now ready for discharge at this time.  Patient's vital signs remained stable.  Patient is up ambulating independently around the unit.  Patient's lungs are clear bilaterally on auscultation.  Patient's abdomen is no longer distended, bowel sounds active in all 4 quadrants, and she is passing bowel movements as regular.  Patient states she has returned to her baseline and is requesting discharge at this time.  Patient is  tolerating p.o. intake and will follow up with her PCP.        Therefore, she is discharged in good and stable condition to home with close outpatient follow-up.    The patient recovered much more quickly than anticipated on admission.    Discharge Date  2/    FOLLOW UP ITEMS POST DISCHARGE  Follow up with PCP     DISCHARGE DIAGNOSES  Principal Problem (Resolved):    Partial small bowel obstruction (HCC) POA: Unknown  Active Problems:    HTN (hypertension) POA: Yes      FOLLOW UP  Future Appointments  Date Time Provider Department Center   [facility]   3/21/2019 1:00 PM Tammy Garza [facility]   11/18/2019 9:00 AM Miriam Church M.D. [facility]       MEDICATIONS ON DISCHARGE     Medication List      CONTINUE taking these medications      Instructions   aspirin 81 MG EC tablet   Take 81 mg by mouth every day.  Dose:  81 mg     atenolol 25 MG Tabs  Commonly known as:  TENORMIN   Take 25 mg by mouth every evening.  Dose:  25 mg     CALCIUM PO   Take 1 Dose by mouth every day. Unknown OTC Strength  Dose:  1 Dose     docosahexanoic acid 1000 MG Caps  Commonly known as:  OMEGA 3 FA   Take 1,000 mg by mouth every morning.  Dose:  1000 mg     escitalopram 10 MG Tabs  Commonly known as:  LEXAPRO   Take 10 mg by mouth every day.  Dose:  10 mg     PROLIA 60 MG/ML Soln  Generic drug:  denosumab   Inject 60 mg as instructed every 6 months. Next dose due: 03/19/19  Dose:  60 mg     SENIOR MULTIVITAMIN PLUS PO   Take 1 Tab by mouth every morning.  Dose:  1 Tab     * SYSTANE 0.4-0.3 % Soln  Generic drug:  polyethyl glycol-propyl glycol   Place 1 Drop in both eyes every morning.  Dose:  1 Drop     * SYSTANE 0.4-0.3 % Gel  Generic drug:  Polyethyl Glycol-Propyl Glycol   Place 1 Application in both eyes every evening.  Dose:  1 Application     vitamin D 1000 UNIT Tabs  Commonly known as:  cholecalciferol   Take 2,000 Units by mouth every day.  Dose:  2000 Units         * This list has 2 medication(s) that are the same as other medications prescribed for you. Read the directions carefully, and ask your doctor or other care provider to review them with you.                Allergies  Allergies   Allergen Reactions   • Latex Rash   • Tetracycline      Sensitivity to sun; sun burns.  Reaction date; 15 years ago from 2013.       DIET  Orders Placed This Encounter   Procedures   • Diet Order Renal     Standing Status:   Standing     Number of Occurrences:   1     Order Specific Question:   Diet:     Answer:   Renal [8]       ACTIVITY  As tolerated.  Weight bearing as tolerated    CONSULTATIONS  None     PROCEDURES  None     LABORATORY  Lab Results   Component Value Date    SODIUM 140 02/28/2019    POTASSIUM 3.8 02/28/2019    CHLORIDE 111 02/28/2019    CO2 23 02/28/2019    GLUCOSE 84 02/28/2019    BUN 21 02/28/2019    CREATININE 1.11 02/28/2019        Lab Results   Component Value Date    WBC 4.0 (L) 02/28/2019    HEMOGLOBIN 12.3 02/28/2019    HEMATOCRIT 38.5 02/28/2019    PLATELETCT 237 02/28/2019      [id number] VIEW   Final Result      No evidence of bowel obstruction.      CT-ABDOMEN-PELVIS WITH   Final Result      1.  Findings consistent with early or partial distal small bowel obstruction.   2.  No evidence for perforation.   3.  No focal mesenteric inflammatory changes.          Total time of the discharge process exceeds 38 minutes.

## 2019-02-28 NOTE — CARE PLAN
Problem: Knowledge Deficit  Goal: Knowledge of disease process/condition, treatment plan, diagnostic tests, and medications will improve    Intervention: Assess knowledge level of disease process/condition, treatment plan, diagnostic tests, and medications  Patient educated on treatment plan. Verbalized understanding.      Problem: Pain Management  Goal: Pain level will decrease to patient's comfort goal  Outcome: PROGRESSING AS EXPECTED  Patient complained of head ache. Medicated for pain as ordered.

## 2019-03-05 ENCOUNTER — HOSPITAL ENCOUNTER (OUTPATIENT)
Dept: LAB | Facility: MEDICAL CENTER | Age: 69
End: 2019-03-05
Attending: INTERNAL MEDICINE
Payer: MEDICARE

## 2019-03-05 DIAGNOSIS — R79.89 ELEVATED PTHRP LEVEL: ICD-10-CM

## 2019-03-05 LAB
CALCIUM SERPL-MCNC: 10.1 MG/DL (ref 8.5–10.5)
PTH-INTACT SERPL-MCNC: 28.7 PG/ML (ref 14–72)

## 2019-03-05 PROCEDURE — 83970 ASSAY OF PARATHORMONE: CPT

## 2019-03-05 PROCEDURE — 36415 COLL VENOUS BLD VENIPUNCTURE: CPT

## 2019-03-05 PROCEDURE — 82310 ASSAY OF CALCIUM: CPT

## 2019-03-13 ENCOUNTER — OUTPATIENT INFUSION SERVICES (OUTPATIENT)
Dept: ONCOLOGY | Facility: MEDICAL CENTER | Age: 69
End: 2019-03-13
Attending: INTERNAL MEDICINE
Payer: MEDICARE

## 2019-03-13 VITALS
OXYGEN SATURATION: 99 % | TEMPERATURE: 97.7 F | HEART RATE: 64 BPM | DIASTOLIC BLOOD PRESSURE: 80 MMHG | SYSTOLIC BLOOD PRESSURE: 132 MMHG | HEIGHT: 67 IN | WEIGHT: 139.55 LBS | RESPIRATION RATE: 18 BRPM | BODY MASS INDEX: 21.9 KG/M2

## 2019-03-13 PROCEDURE — 96401 CHEMO ANTI-NEOPL SQ/IM: CPT

## 2019-03-13 PROCEDURE — 700111 HCHG RX REV CODE 636 W/ 250 OVERRIDE (IP): Mod: JG | Performed by: INTERNAL MEDICINE

## 2019-03-13 RX ORDER — IBUPROFEN 200 MG
950 CAPSULE ORAL DAILY
COMMUNITY

## 2019-03-13 RX ADMIN — DENOSUMAB 60 MG: 60 INJECTION SUBCUTANEOUS at 14:30

## 2019-03-13 NOTE — PROGRESS NOTES
[facility] Pharmacy Note    Zena Paz  MR: [id number]    Labs: 2/28/2019:  Ca: 10.1  ALB: 3.3  Yamila Ca: 10.7      SCr: 1.11  CrCl: 49.2 mls/min    OK for Silvia Pina, PharmD

## 2019-03-13 NOTE — LETTER
Infusion Services   [address] [place] [id number]  Phone: [phone]  Fax: [phone]              Dear Dr. Garza,    Your patient, Zena Paz (: 1950), was scheduled at [facility].  Zena's encounter diagnosis is:  No diagnosis found.  She arrived for her appointment and the scheduled treatment was given. These medications were administered to the patient: We administered denosumab..  Zena tolerated treatment. In addition, the following labs were drawn  No results found for this or any previous visit (from the past 24 hour(s)).         Her next appointment is not scheduled, because she must see her provider first.    For more information, you may review the nurse's progress notes in chart review under the notes section.       Sincerely,  Francesca Starks R.N.

## 2019-03-13 NOTE — PROGRESS NOTES
Pt scheduled for Prolia SQ. Arrived ambulatory, independent; denied pain/discomfort, s/sx infection, or other health changes. Pt denied recent/upcoming invasive dental procedures. Most recent labs completed 19; ok to use per pharmacist. Injection given in back RUE. Treatment plan reviewed; pt notified to follow up w/ referring MD prior to next dose as current orders will be ; pt verbalized understanding. Pt denied any unmet needs; discharged ambulatory, independent, NAD.

## 2019-03-28 ENCOUNTER — OFFICE VISIT (OUTPATIENT)
Dept: MEDICAL GROUP | Facility: MEDICAL CENTER | Age: 69
End: 2019-03-28
Payer: MEDICARE

## 2019-03-28 VITALS
OXYGEN SATURATION: 98 % | WEIGHT: 140.4 LBS | HEART RATE: 68 BPM | DIASTOLIC BLOOD PRESSURE: 70 MMHG | TEMPERATURE: 97.7 F | RESPIRATION RATE: 20 BRPM | BODY MASS INDEX: 22.03 KG/M2 | HEIGHT: 67 IN | SYSTOLIC BLOOD PRESSURE: 100 MMHG

## 2019-03-28 DIAGNOSIS — I10 ESSENTIAL HYPERTENSION: ICD-10-CM

## 2019-03-28 DIAGNOSIS — Z23 NEED FOR VACCINATION: ICD-10-CM

## 2019-03-28 DIAGNOSIS — N18.30 CHRONIC KIDNEY DISEASE, STAGE 3, MOD DECREASED GFR (HCC): ICD-10-CM

## 2019-03-28 DIAGNOSIS — Z85.43 HISTORY OF OVARIAN CANCER: ICD-10-CM

## 2019-03-28 DIAGNOSIS — K56.609 SBO (SMALL BOWEL OBSTRUCTION) (HCC): ICD-10-CM

## 2019-03-28 DIAGNOSIS — G62.9 NEUROPATHY: ICD-10-CM

## 2019-03-28 DIAGNOSIS — M81.0 AGE-RELATED OSTEOPOROSIS WITHOUT CURRENT PATHOLOGICAL FRACTURE: ICD-10-CM

## 2019-03-28 PROBLEM — R79.89 ELEVATED PTHRP LEVEL: Status: RESOLVED | Noted: 2018-08-14 | Resolved: 2019-03-28

## 2019-03-28 PROBLEM — N18.9 ACUTE ON CHRONIC RENAL INSUFFICIENCY: Status: RESOLVED | Noted: 2018-08-20 | Resolved: 2019-03-28

## 2019-03-28 PROBLEM — N28.9 ACUTE ON CHRONIC RENAL INSUFFICIENCY: Status: RESOLVED | Noted: 2018-08-20 | Resolved: 2019-03-28

## 2019-03-28 PROBLEM — R23.2 VASOMOTOR FLUSHING: Status: RESOLVED | Noted: 2018-04-05 | Resolved: 2019-03-28

## 2019-03-28 PROBLEM — D72.829 LEUKOCYTOSIS: Status: RESOLVED | Noted: 2018-08-20 | Resolved: 2019-03-28

## 2019-03-28 PROCEDURE — 90732 PPSV23 VACC 2 YRS+ SUBQ/IM: CPT | Performed by: INTERNAL MEDICINE

## 2019-03-28 PROCEDURE — 99214 OFFICE O/P EST MOD 30 MIN: CPT | Mod: 25 | Performed by: INTERNAL MEDICINE

## 2019-03-28 PROCEDURE — G0009 ADMIN PNEUMOCOCCAL VACCINE: HCPCS | Performed by: INTERNAL MEDICINE

## 2019-03-28 NOTE — PROGRESS NOTES
Chief Complaint   Patient presents with   • Hospital Follow-up     bowel obstc.   Chief complaint: Hospital follow-up of bowel obstruction, 6-month follow-up      HISTORY OF PRESENT ILLNESS: Patient is a 68 y.o. female patient who presents today to discuss the evaluation and management of:          1. History of ovarian cancer    Patient is followed at [facility] for history of stage IIIc ovarian cancer in 2012.  She had recent MRI from neck to pelvis which was negative, blood work, tumor markers, all of which negative, she has no evidence of recurrent disease.    2. SBO (small bowel obstruction) (Formerly [facility])    Unfortunately, patient continues to have episodic small bowel obstruction.  She was hospitalized for 2 days at the end of February with the same.  It resolved with IV fluids, she did not require any other intervention.    3. Essential hypertension    Patient has intermittent mildly elevated blood pressure, and a history of mild arrhythmia.  She remains on atenolol 25 mg daily for this.    4. Chronic kidney disease, stage 3, mod decreased GFR ([facility])    Patient has had stage III chronic kidney disease since her chemotherapy.  GFR runs in the high 40s and has been stable.    5. Age-related osteoporosis without current pathological fracture    Patient is been on Prolia for almost 5 years.  Her last DEXA scan was in 2017 and showed T score -1.9 and -1.8, which was improved from previous.  Plan is to give 1 more dose of Prolia then discontinue and monitor    6. Need for vaccination    Patient is due for her second pneumonia shot    7. 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  She uses CBD cream topically with reasonable relief, it particularly bothers her at night.        Patient Active Problem List    Diagnosis Date Noted   • SBO (small bowel obstruction) ([facility]) 08/20/2018     Priority: High   • Chronic kidney disease, stage 3, mod decreased GFR ([facility]) 08/14/2018   • Age-related osteoporosis without  "current pathological fracture 08/14/2018   • History of ovarian cancer 04/05/2018   • Neuropathy (HCC) 04/05/2018   • Pancreatic insufficiency 04/05/2018   • Primary insomnia 04/05/2018   • HTN (hypertension) 08/30/2012        Allergies:Latex; Losartan; and Tetracycline    Current meds including changes today  Current Outpatient Prescriptions   Medication Sig Dispense Refill   • calcium citrate (CALCITRATE) 950 MG Tab Take 950 mg by mouth every day.     • atenolol (TENORMIN) 25 MG Tab Take 25 mg by mouth every evening.     • escitalopram (LEXAPRO) 10 MG Tab Take 10 mg by mouth every day.     • CALCIUM PO Take 1 Dose by mouth every day. Unknown OTC Strength     • polyethyl glycol-propyl glycol (SYSTANE) 0.4-0.3 % Solution Place 1 Drop in both eyes every morning.     • Polyethyl Glycol-Propyl Glycol (SYSTANE) 0.4-0.3 % Gel Place 1 Application in both eyes every evening.     • denosumab (PROLIA) 60 MG/ML Solution Inject 60 mg as instructed every 6 months. Next dose due: 03/19/19     • vitamin D (CHOLECALCIFEROL) 1000 UNIT TABS Take 2,000 Units by mouth every day.     • aspirin 81 MG EC tablet Take 81 mg by mouth every day.     • Multiple Vitamins-Minerals (SENIOR MULTIVITAMIN PLUS PO) Take 1 Tab by mouth every morning.     • docosahexanoic acid (OMEGA 3 FA) 1000 MG CAPS Take 1,000 mg by mouth every morning.       No current facility-administered medications for this visit.      Social History   Substance Use Topics   • Smoking status: Never Smoker   • Smokeless tobacco: Never Used   • Alcohol use Yes      Comment: occasional     Social History     Social History Narrative   • No narrative on file       Family History   Problem Relation Age of Onset   • Heart Disease Mother    • Stroke Father            Exam:      Blood pressure 100/70, pulse 68, temperature 36.5 °C (97.7 °F), temperature source Temporal, resp. rate 20, height 1.702 m (5' 7\"), weight 63.7 kg (140 lb 6.4 oz), SpO2 98 %, not currently " breastfeeding.  General:  Well nourished, well developed female in NAD affect and mood within normal limits  Head is grossly normal.  Neck: Supple without adenopathy  Pulmonary: Clear to ausculation.  Normal effort. No rales, rhonchi, or wheezing.  Cardiovascular: Regular rate and rhythm without murmur.   Extremities: no clubbing, cyanosis, or edema.  Neuro: moves all extremities symmetrically    Please note that this dictation was created using voice recognition software. I have made every reasonable attempt to correct obvious errors, but I expect that there are errors of grammar and possibly content that I did not discover before finalizing the note.    Assessment/Plan:  1. History of ovarian cancer    Followed closely by [facility], patient is in remission    2. SBO (small bowel obstruction) ([facility])    Patient is trying to follow a low residue diet, she also consumes Angelo in attempt to encourage good naye    3. Essential hypertension        4. Chronic kidney disease, stage 3, mod decreased GFR ([facility])    GFR is been stable, continue to monitor    5. Age-related osteoporosis without current pathological fracture    -We will order Prolia for September 2019, this will complete a 5-year course.    6. Need for vaccination      - Pneumococal Polysaccharide Vaccine 23-Valent =>3YO SQ/IM    7. Neuropathy ([facility])        Followup: Patient was advised I will be moving to [place] this summer, she should be seen next followed by her new provider in time for flu shot

## 2019-05-06 RX ORDER — ESCITALOPRAM OXALATE 10 MG/1
TABLET ORAL
Qty: 90 TAB | Refills: 1 | Status: SHIPPED | OUTPATIENT
Start: 2019-05-06 | End: 2019-10-19 | Stop reason: SDUPTHER

## 2019-05-06 NOTE — TELEPHONE ENCOUNTER
Was the patient seen in the last year in this department? Yes  Does patient have an active prescription for medications requested? No   Received Request Via: Pharmacy

## 2019-06-14 ENCOUNTER — PATIENT MESSAGE (OUTPATIENT)
Dept: MEDICAL GROUP | Facility: MEDICAL CENTER | Age: 69
End: 2019-06-14

## 2019-06-20 ENCOUNTER — OFFICE VISIT (OUTPATIENT)
Dept: MEDICAL GROUP | Facility: MEDICAL CENTER | Age: 69
End: 2019-06-20
Payer: MEDICARE

## 2019-06-20 VITALS
HEIGHT: 67 IN | SYSTOLIC BLOOD PRESSURE: 112 MMHG | RESPIRATION RATE: 18 BRPM | HEART RATE: 52 BPM | BODY MASS INDEX: 22 KG/M2 | OXYGEN SATURATION: 97 % | WEIGHT: 140.2 LBS | TEMPERATURE: 97.8 F | DIASTOLIC BLOOD PRESSURE: 78 MMHG

## 2019-06-20 DIAGNOSIS — M81.0 AGE-RELATED OSTEOPOROSIS WITHOUT CURRENT PATHOLOGICAL FRACTURE: ICD-10-CM

## 2019-06-20 DIAGNOSIS — K56.609 SBO (SMALL BOWEL OBSTRUCTION) (HCC): ICD-10-CM

## 2019-06-20 DIAGNOSIS — Z12.31 ENCOUNTER FOR SCREENING MAMMOGRAM FOR BREAST CANCER: ICD-10-CM

## 2019-06-20 DIAGNOSIS — M25.561 CHRONIC PAIN OF RIGHT KNEE: ICD-10-CM

## 2019-06-20 DIAGNOSIS — Z85.43 HISTORY OF OVARIAN CANCER: ICD-10-CM

## 2019-06-20 DIAGNOSIS — G89.29 CHRONIC PAIN OF RIGHT KNEE: ICD-10-CM

## 2019-06-20 PROCEDURE — 99214 OFFICE O/P EST MOD 30 MIN: CPT | Performed by: INTERNAL MEDICINE

## 2019-06-20 RX ORDER — ONDANSETRON HYDROCHLORIDE 8 MG/1
8 TABLET, FILM COATED ORAL
COMMUNITY
Start: 2019-06-13

## 2019-06-20 RX ORDER — LORAZEPAM 1 MG/1
1 TABLET ORAL
COMMUNITY
Start: 2019-06-13 | End: 2022-12-09

## 2019-06-20 RX ORDER — ZOSTER VACCINE RECOMBINANT, ADJUVANTED 50 MCG/0.5
KIT INTRAMUSCULAR
COMMUNITY
Start: 2019-06-11 | End: 2019-09-20

## 2019-06-20 ASSESSMENT — PATIENT HEALTH QUESTIONNAIRE - PHQ9: CLINICAL INTERPRETATION OF PHQ2 SCORE: 0

## 2019-06-20 NOTE — PROGRESS NOTES
Chief Complaint   Patient presents with   • Referral Needed     mammo,PT,GI     Chief complaint: 3-month follow-up of chronic medical problems, including osteoporosis, history of ovarian cancer    HISTORY OF PRESENT ILLNESS: Patient is a 69 y.o. female patient who presents today to discuss the evaluation and management of:          1. Age-related osteoporosis without current pathological fracture    Patient saw a specialist when she was down in [facility] recently, note was reviewed in her chart..  He recommended that she continue Prolia for 1 more year.  Her bone density did show improvement when last checked in 2017.    2. Chronic pain of right knee    Patient has mild osteoarthritis in both knees, she developed pain in her right knee a couple months ago after jumping to avoid a snake on the [place].  She may have torn cartilage at that time.  She was recommended to have physical therapy when she was down in [place], she would like in order to have it done locally.    3. Encounter for screening mammogram for breast cancer    Patient will be due for her mammogram in October 2019, will order today    4. History of ovarian cancer    Patient was diagnosed with stage IIIc ovarian cancer in 2012.  She is followed by oncology at [facility].  She had total body imaging in March which was negative for evidence of recurrent disease.  She does have residual peripheral neuropathy and chronic kidney disease due to the chemotherapy.    5. SBO (small bowel obstruction) ([facility])    Patient has had recurrent small bowel obstructions due to her ovarian cancer treatment.  She has Zofran and Ativan which she takes to help with the nausea and vomiting which occurs with this, she takes it as needed        Patient Active Problem List    Diagnosis Date Noted   • SBO (small bowel obstruction) ([facility]) 08/20/2018     Priority: High   • Chronic pain of right knee 06/20/2019   • Chronic kidney disease, stage 3, mod decreased GFR ([facility]) 08/14/2018   • Age-related  osteoporosis without current pathological fracture 08/14/2018   • History of ovarian cancer 04/05/2018   • Neuropathy (HCC) 04/05/2018   • Pancreatic insufficiency 04/05/2018   • Primary insomnia 04/05/2018   • HTN (hypertension) 08/30/2012        Allergies:Latex; Losartan; and Tetracycline    Current meds including changes today  Current Outpatient Prescriptions   Medication Sig Dispense Refill   • LORazepam (ATIVAN) 1 MG Tab Take 1 mg by mouth.     • ondansetron (ZOFRAN) 8 MG Tab Take 8 mg by mouth.     • atenolol (TENORMIN) 25 MG Tab TAKE 1 TABLET BY MOUTH EVERY EVENING 90 Tab 2   • escitalopram (LEXAPRO) 10 MG Tab TAKE 1 TABLET BY MOUTH EVERY MORNING 90 Tab 1   • calcium citrate (CALCITRATE) 950 MG Tab Take 950 mg by mouth every day.     • CALCIUM PO Take 1 Dose by mouth every day. Unknown OTC Strength     • polyethyl glycol-propyl glycol (SYSTANE) 0.4-0.3 % Solution Place 1 Drop in both eyes every morning.     • denosumab (PROLIA) 60 MG/ML Solution Inject 60 mg as instructed every 6 months. Next dose due: 03/19/19     • vitamin D (CHOLECALCIFEROL) 1000 UNIT TABS Take 2,000 Units by mouth every day.     • aspirin 81 MG EC tablet Take 81 mg by mouth every day.     • Multiple Vitamins-Minerals (SENIOR MULTIVITAMIN PLUS PO) Take 1 Tab by mouth every morning.     • docosahexanoic acid (OMEGA 3 FA) 1000 MG CAPS Take 1,000 mg by mouth every morning.       No current facility-administered medications for this visit.      Social History   Substance Use Topics   • Smoking status: Never Smoker   • Smokeless tobacco: Never Used   • Alcohol use Yes      Comment: occasional     Social History     Social History Narrative   • No narrative on file       Family History   Problem Relation Age of Onset   • Heart Disease Mother    • Stroke Father        Review of Systems:  No chest pain, No shortness of breath, No dyspnea on exertion  Gastrointestinal ROS: No abdominal pain, No nausea, vomiting, diarrhea, or  "constipation        Exam:      /78 (BP Location: Left arm, Patient Position: Sitting)   Pulse (!) 52   Temp 36.6 °C (97.8 °F) (Temporal)   Resp 18   Ht 1.702 m (5' 7\")   Wt 63.6 kg (140 lb 3.2 oz)   SpO2 97%   General:  Well nourished, well developed female in NAD affect and mood within normal limits  Head is grossly normal.  Neck: Supple without adenopathy  Pulmonary: Clear to ausculation.  Normal effort. No rales, rhonchi, or wheezing.  Cardiovascular: Regular rate and rhythm without murmur.  Mild bradycardia, on beta-blocker  Extremities: no clubbing, cyanosis, or edema.  Neuro: moves all extremities symmetrically    Please note that this dictation was created using voice recognition software. I have made every reasonable attempt to correct obvious errors, but I expect that there are errors of grammar and possibly content that I did not discover before finalizing the note.    Assessment/Plan:  1. Age-related osteoporosis without current pathological fracture    Continue Prolia for 1-2 more doses.  We discussed that there is no evidence that shows benefit of treating beyond 5 or 6 years.  She is on adequate calcium and vitamin D.    2. Chronic pain of right knee      - REFERRAL TO PHYSICAL THERAPY Reason for Therapy: Eval/Treat/Report    3. Encounter for screening mammogram for breast cancer      - MA-SCREEN MAMMO W/CAD-BILAT; Future    4. History of ovarian cancer    No evidence of recurrent disease, followed by [facility]    5. SBO (small bowel obstruction) (HCC)        Followup: Patient will likely seek to establish with Dr. Grajeda at [facility], this is close to her house in [place].       "

## 2019-09-03 ENCOUNTER — PHYSICAL THERAPY (OUTPATIENT)
Dept: PHYSICAL THERAPY | Facility: REHABILITATION | Age: 69
End: 2019-09-03
Attending: INTERNAL MEDICINE
Payer: MEDICARE

## 2019-09-03 DIAGNOSIS — G89.29 CHRONIC PAIN OF RIGHT KNEE: ICD-10-CM

## 2019-09-03 DIAGNOSIS — M25.561 CHRONIC PAIN OF RIGHT KNEE: ICD-10-CM

## 2019-09-03 PROCEDURE — 97110 THERAPEUTIC EXERCISES: CPT

## 2019-09-03 SDOH — ECONOMIC STABILITY: GENERAL: QUALITY OF LIFE: EXCELLENT

## 2019-09-03 ASSESSMENT — ENCOUNTER SYMPTOMS
PAIN SCALE AT HIGHEST: 5
PAIN TIMING: DURING SLEEP
QUALITY: DULL ACHE
PAIN SCALE AT LOWEST: 0
ALLEVIATING FACTORS: COLD/ICE
PAIN SCALE: 3

## 2019-09-03 NOTE — OP THERAPY EVALUATION
"  Outpatient Physical Therapy  INITIAL EVALUATION    [facility]  [address], [address]  [place] [address]  Phone:  [phone]    Date of Evaluation: 2019    Patient: Zena Paz  YOB: 1950  MRN: [id number]     Referring Provider: Tammy Garza M.D.  [address]  [place] [address]   Referring Diagnosis Chronic pain of right knee [M25.561, G89.29]     Time Calculation  Start time: 1035  Stop time: 1135 Time Calculation (min): 60 minutes       Physical Therapy Occurrence Codes    Date of onset of impairment:  3/3/19   Date physical therapy care plan established or reviewed:  9/3/19   Date physical therapy treatment started:  9/3/19          Chief Complaint: Knee Problem    Visit Diagnoses     ICD-10-CM   1. Chronic pain of right knee M25.561    G89.29         Subjective:   History of Present Illness:     Mechanism of injury:  Pt is 68 yo female who has had B knee pain since early 30s.  She is very active and has modified activity to accommodate knee pain. 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  She camps regularly and is finding it increasingly difficult to get up from the ground.  She began to have increased R>L knee pain 6 months ago with no JES. Pain is intermittent and episodic. She can have bouts of no pain and then bouts of several weeks of pain.  Was having trouble turning in bed last week due to R knee pain but now it feels better.        PMH: PLDF L5-S1 , ovarian cancer: CA free for 7 years, HTN controlled with medication.   Quality of life:  Excellent  Prior level of function:  Is able to swim regularly and hike x 8-9 hours without increasing sxs.    Pain:     Current pain rating:  3    At best pain ratin    At worst pain ratin    Location:  Deep joint pain    Quality:  Dull ache    Pain timing:  During sleep (worse)    Relieving factors:  Cold/ice    Pain " Comments::  Aggs:  Getting up from floor, stepping up rocks on hikes, getting off the toilet, squatting, hiking down steep hills.   Diagnostic Tests:     None    Treatments:     None    Patient Goals:     Patient goals for therapy:  Decreased pain and increased motion    Other patient goals:  Learn exercises      Past Medical History:   Diagnosis Date   • Backpain     had back surgery, s1-l5 fusion   • Bowel obstruction (HCC)    • Cancer (HCC)     overian cancer   • Heart murmur    • Hypertension    • Indigestion     sometimes   • Other specified symptom associated with female genital organs     had ca ov the ovary   • Schwannoma of cranial nerve (HCC)      Past Surgical History:   Procedure Laterality Date   • GYN SURGERY     • HYSTERECTOMY RADICAL     • LUMBAR FUSION POSTERIOR     • OTHER ORTHOPEDIC SURGERY      had back surgery     Social History     Tobacco Use   • Smoking status: Never Smoker   • Smokeless tobacco: Never Used   Substance Use Topics   • Alcohol use: Yes     Comment: occasional     Family and Occupational History     Socioeconomic History   • Marital status:      Spouse name: Not on file   • Number of children: Not on file   • Years of education: Not on file   • Highest education level: Not on file   Occupational History   • Not on file       Objective     Observations     Additional Observation Details  - heel walking, toe walking  + squat:  Able to go full ROM but unable to stand without UE A    Neurological Testing     Myotome testing   Lumbar (left)   All left lumbar myotomes within normal limits    Lumbar (right)   L2 (hip flexors): 4  L3 (knee extensors): 5  L4 (ankle dorsiflexors): 5  L5 (great toe extension): 5    Dermatome testing   Lumbar (left)   All left lumbar dermatomes intact    Lumbar (right)   All right lumbar dermatomes intact    Additional Neurological Details  Has B foot neuropathy from chemo    Tenderness   Left Knee   No tenderness in the lateral joint line, lateral  patella and medial joint line.     Right Knee   Tenderness in the lateral joint line and medial joint line. No tenderness in the lateral patella and patellar tendon.     Active Range of Motion   Left Knee   Normal active range of motion    Right Knee   Normal active range of motion    Passive Range of Motion   Left Hip   External rotation (90/90): 18 degrees     Right Hip   External rotation (90/90): 16 degrees     Patellar Mobility   Left Knee Hypermobile in the left medial, left lateral, left superior and left inferior patellar tendon(s).     Right Knee Hypermobile in the medial, lateral, superior and inferior patellar tendon(s).     Strength:      Left Knee   Prone flexion: 4+  Extension: 5  Quadriceps contraction: good    Right Knee   Prone flexion: 4  Extension: 5    Additional Strength Details  B hip ext:  4/5, L hip abd: 4/5, R: 4+    Tests     Left Knee   Negative anterior drawer, pivot shift, valgus stress test at 0 degrees, valgus stress test at 30 degrees, varus stress test at 0 degrees and varus stress test at 30 degrees.     Right Knee   Negative anterior drawer, medial Kamran, pivot shift, valgus stress test at 0 degrees, valgus stress test at 30 degrees, varus stress test at 0 degrees and varus stress test at 30 degrees.     Additional Tests Details  + B Alverto:  No pain, but poor hip ROM for add.      Nikko LumbarTest     Standing repeated motions:   Repeat flexion in standing     Symptoms during testing: no effect    Symptoms after testing: no effect    Mechanical response: no effect        Therapeutic Exercises (CPT 00255):     1. Sahrmann B hip IR/Er x 20    2. B SL hip abd x 20., Handout given and added to HEP.       Time-based treatments/modalities:  Therapeutic exercise minutes (CPT 35408): 15 minutes       Assessment, Response and Plan:   Impairments: abnormal or restricted ROM, impaired physical strength, lacks appropriate home exercise program and pain with function    Other Impairments:   Hypermobility of B patella  Assessment details:  Pt presents with decreased B hip strength, ROM and B patellar hypermobility that contributes to B knee pain that leads to decrease in functional mobility.  To benefit from skilled PT to help restore movement and strength imbalances to enable participation in daily routine and recreational activities without increasing sxs.      Barriers to therapy:  None  Prognosis: good    Goals:   Short Term Goals:   1.  Pt will be able to go up and down stairs with 50% improvement in pain c/o's.  2.  Pt will be able to roll over in bed without waking from knee pain, 50% of the time.  Short term goal time span:  2-4 weeks      Long Term Goals:    1.  Pt will be able to go up and down stairs with 80% improvement in pain c/o's.  2.  Pt will be able to roll over in bed without waking from knee pain, 80% of the time.  3.  Pt will be able to get up from toilet without knee pain, 80% of the time.  4.  I in HEP for self maintenance.   Long term goal time span:  6-8 weeks    Plan:   Therapy options:  Physical therapy treatment to continue  Planned therapy interventions:  E Stim Unattended (CPT 50749), Manual Therapy (CPT 55059), Therapeutic Exercise (CPT 84824), Therapeutic Activities (CPT 75645) and Neuromuscular Re-education (CPT 00142)  Frequency:  2x week  Duration in weeks:  8  Discussed with:  Patient      Functional Assessment Used  WOMAC Grand Total: 22.92     Referring provider co-signature:  I have reviewed this plan of care and my co-signature certifies the need for services.  Certification Dates:   From 09/03/19   To 10/29/19    Physician Signature: ________________________________ Date: ______________

## 2019-09-12 ENCOUNTER — PHYSICAL THERAPY (OUTPATIENT)
Dept: PHYSICAL THERAPY | Facility: REHABILITATION | Age: 69
End: 2019-09-12
Attending: INTERNAL MEDICINE
Payer: MEDICARE

## 2019-09-12 DIAGNOSIS — M25.561 CHRONIC PAIN OF RIGHT KNEE: ICD-10-CM

## 2019-09-12 DIAGNOSIS — G89.29 CHRONIC PAIN OF RIGHT KNEE: ICD-10-CM

## 2019-09-12 PROCEDURE — 97110 THERAPEUTIC EXERCISES: CPT

## 2019-09-12 NOTE — OP THERAPY DAILY TREATMENT
Outpatient Physical Therapy  DAILY TREATMENT     [facility] [address], [address]  [place] [address]  Phone:  [phone]    Date: 09/12/2019    Patient: Zena Paz  YOB: 1950  MRN: [id number]     Time Calculation  Start time: 0830  Stop time: 0900 Time Calculation (min): 30 minutes       Chief Complaint: Knee Problem    Visit #: 2    SUBJECTIVE:  Had some knee pain after last visit.      OBJECTIVE:  Current objective measures: R hypermobile patella          Therapeutic Exercises (CPT 73277):     1. Bike x 3'    2. Ball bridges 5 sec x 20*    3. B SL hip abd x 20    4. Ball wall squats x 20 *    5. B LE Shuttle press 4 cord x 40, *added HEP with hand/o given    6. BOSU static stand, lateral WS, marching with min UE A    Therapeutic Treatments and Modalities:     1. Manual Therapy (CPT 13303), MFR/STM to R ITB,quads    Time-based treatments/modalities:  Manual therapy minutes (CPT 26520): 5 minutes  Therapeutic exercise minutes (CPT 14753): 25 minutes         ASSESSMENT:   Response to treatment: Weak posterior chain contributes to knee pain.     PLAN/RECOMMENDATIONS:   Plan for treatment: therapy treatment to continue next visit.  Planned interventions for next visit: continue with current treatment. Progress knee strength and proprioception.

## 2019-09-16 ENCOUNTER — PHYSICAL THERAPY (OUTPATIENT)
Dept: PHYSICAL THERAPY | Facility: REHABILITATION | Age: 69
End: 2019-09-16
Attending: INTERNAL MEDICINE
Payer: MEDICARE

## 2019-09-16 DIAGNOSIS — G89.29 CHRONIC PAIN OF RIGHT KNEE: ICD-10-CM

## 2019-09-16 DIAGNOSIS — M25.561 CHRONIC PAIN OF RIGHT KNEE: ICD-10-CM

## 2019-09-16 PROCEDURE — 97110 THERAPEUTIC EXERCISES: CPT

## 2019-09-16 PROCEDURE — 97140 MANUAL THERAPY 1/> REGIONS: CPT

## 2019-09-16 NOTE — OP THERAPY DAILY TREATMENT
"  Outpatient Physical Therapy  DAILY TREATMENT     [facility] [address], [address]  [place] [address]  Phone:  [phone]    Date: 09/16/2019    Patient: Zena Paz  YOB: 1950  MRN: [id number]     Time Calculation  Start time: 0825  Stop time: 0905 Time Calculation (min): 40 minutes       Chief Complaint: Knee Problem    Visit #: 3    SUBJECTIVE:  Feeling R knee when she rises from couch or turns in bed.  Maybe noticing R knee pain more.      OBJECTIVE:         Therapeutic Exercises (CPT 02393):     1. Bike x 5'    2. Ball bridges 5 sec x 20    3. B SL hip abd x 20    4. Ball wall squats x 20     5. B LE Shuttle press 4 cord x 30    6. BOSU static stand, static stand with EC, minisquats, B alt lunges    7. 4\" step lowering with R SLS with UE A    8. R active Bhaskar stretch x 20.  Added to HEP with handout.     Therapeutic Treatments and Modalities:     1. Manual Therapy (CPT 97719), MFR/STM to R ITB,quads    Time-based treatments/modalities:  Manual therapy minutes (CPT 92412): 10 minutes  Therapeutic exercise minutes (CPT 80448): 30 minutes       Pain rating before treatment: 0  Pain rating after treatment: 0    ASSESSMENT:   Response to treatment: Needs frequent cueing to maintain optimal alignment of R knee with closed chain exercises and to avoid overuse of hamstrings vs gluts.      PLAN/RECOMMENDATIONS:   Plan for treatment: therapy treatment to continue next visit.  Planned interventions for next visit: continue with current treatment. Progess R knee proprioception.       "

## 2019-09-19 ENCOUNTER — PHYSICAL THERAPY (OUTPATIENT)
Dept: PHYSICAL THERAPY | Facility: REHABILITATION | Age: 69
End: 2019-09-19
Attending: INTERNAL MEDICINE
Payer: MEDICARE

## 2019-09-19 DIAGNOSIS — G89.29 CHRONIC PAIN OF RIGHT KNEE: ICD-10-CM

## 2019-09-19 DIAGNOSIS — M25.561 CHRONIC PAIN OF RIGHT KNEE: ICD-10-CM

## 2019-09-19 PROCEDURE — 97140 MANUAL THERAPY 1/> REGIONS: CPT

## 2019-09-19 PROCEDURE — 97110 THERAPEUTIC EXERCISES: CPT

## 2019-09-19 NOTE — OP THERAPY DAILY TREATMENT
Outpatient Physical Therapy  DAILY TREATMENT     [facility] [address], [address]  [place] [address]  Phone:  [phone]    Date: 09/19/2019    Patient: Padmiin Paz  YOB: 1950  MRN: [id number]     Time Calculation  Start time: 0830  Stop time: 0915 Time Calculation (min): 45 minutes       Chief Complaint: Knee Problem    Visit #: 4    SUBJECTIVE:  Feels R knee most when she gets out of a chair.  Feeling 50% better.    OBJECTIVE:  Current objective measures: AROM  R  Knee ext:  -7//0          Therapeutic Exercises (CPT 12026):     1. Bike x 5'    2. Ball bridges 5 sec x 20    3. B SL hip abd x 20    4. Ball wall squats x 20     5. B LE Shuttle press 4 cord x 30    6. BOSU static stand, static stand with EC, minisquats, B alt lunges    Therapeutic Treatments and Modalities:     1. Manual Therapy (CPT 19281), MFR/STM to R ITB,quads.  Leukotape for R patellar float.    Time-based treatments/modalities:  Manual therapy minutes (CPT 94356): 15 minutes  Therapeutic exercise minutes (CPT 12071): 30 minutes       Pain rating before treatment: 0  Pain rating after treatment: 0    ASSESSMENT:   Response to treatment: Improved R knee ext after above.      PLAN/RECOMMENDATIONS:   Plan for treatment: therapy treatment to continue next visit.  Planned interventions for next visit: continue with current treatment. Progress proprioception.

## 2019-09-20 ENCOUNTER — OFFICE VISIT (OUTPATIENT)
Dept: MEDICAL GROUP | Facility: MEDICAL CENTER | Age: 69
End: 2019-09-20
Payer: MEDICARE

## 2019-09-20 VITALS
HEART RATE: 50 BPM | SYSTOLIC BLOOD PRESSURE: 114 MMHG | WEIGHT: 139.33 LBS | TEMPERATURE: 96.9 F | BODY MASS INDEX: 21.87 KG/M2 | OXYGEN SATURATION: 96 % | DIASTOLIC BLOOD PRESSURE: 74 MMHG | HEIGHT: 67 IN

## 2019-09-20 DIAGNOSIS — M81.0 AGE-RELATED OSTEOPOROSIS WITHOUT CURRENT PATHOLOGICAL FRACTURE: ICD-10-CM

## 2019-09-20 DIAGNOSIS — Z85.43 HISTORY OF OVARIAN CANCER: ICD-10-CM

## 2019-09-20 DIAGNOSIS — M25.561 CHRONIC PAIN OF RIGHT KNEE: ICD-10-CM

## 2019-09-20 DIAGNOSIS — G89.29 CHRONIC PAIN OF RIGHT KNEE: ICD-10-CM

## 2019-09-20 PROCEDURE — 99214 OFFICE O/P EST MOD 30 MIN: CPT | Performed by: FAMILY MEDICINE

## 2019-09-20 NOTE — PROGRESS NOTES
Subjective:     CC: Diagnoses of Age-related osteoporosis without current pathological fracture, Chronic pain of right knee, and History of ovarian cancer were pertinent to this visit.    HPI: Patient is a 69 y.o. female established patient who presents today for a prescription for Prolia.      Age-related osteoporosis without current pathological fracture  Chronic problem.  The patient is here today because she supposed to get her Prolia infusion however, since Dr. Garza left the infusion center would not honor the prescription.  Patient states that she has been on Prolia for the past 4 years.  She supposed to be on it for 1 more year.  She has a history of ovarian cancer.  She sees an oncologist at [facility].  Bone density was last checked in 2017 which did show improvement from the Prolia.    Chronic pain of right knee  Chronic problem.  The patient has noted improvement since starting physical therapy.    History of ovarian cancer  History of ovarian cancer.  She was diagnosed with stage IIIc ovarian cancer in 2012.  She continues to be followed by oncology at [facility].  She has residual peripheral neuropathy as well as chronic kidney disease due to the chemotherapy.      Past Medical History:   Diagnosis Date   • Backpain     had back surgery, s1-l5 fusion   • Bowel obstruction (HCC)    • Cancer (HCC)     overian cancer   • Heart murmur    • Hypertension    • Indigestion     sometimes   • Other specified symptom associated with female genital organs     had ca ov the ovary   • Schwannoma of cranial nerve (HCC)        Social History     Tobacco Use   • Smoking status: Never Smoker   • Smokeless tobacco: Never Used   Substance Use Topics   • Alcohol use: Yes     Comment: occasional   • Drug use: No       Current Outpatient Medications Ordered in Epic   Medication Sig Dispense Refill   • denosumab (PROLIA) 60 MG/ML Solution Prefilled Syringe Inject 1 mL as instructed Once for 1 dose. 1 mL 0   • LORazepam (ATIVAN) 1 MG  "Tab Take 1 mg by mouth.     • ondansetron (ZOFRAN) 8 MG Tab Take 8 mg by mouth.     • atenolol (TENORMIN) 25 MG Tab TAKE 1 TABLET BY MOUTH EVERY EVENING 90 Tab 2   • escitalopram (LEXAPRO) 10 MG Tab TAKE 1 TABLET BY MOUTH EVERY MORNING 90 Tab 1   • calcium citrate (CALCITRATE) 950 MG Tab Take 950 mg by mouth every day.     • polyethyl glycol-propyl glycol (SYSTANE) 0.4-0.3 % Solution Place 1 Drop in both eyes every morning.     • denosumab (PROLIA) 60 MG/ML Solution Inject 60 mg as instructed every 6 months. Next dose due: 03/19/19     • vitamin D (CHOLECALCIFEROL) 1000 UNIT TABS Take 2,000 Units by mouth every day.     • aspirin 81 MG EC tablet Take 81 mg by mouth every day.     • Multiple Vitamins-Minerals (SENIOR MULTIVITAMIN PLUS PO) Take 1 Tab by mouth every morning.     • docosahexanoic acid (OMEGA 3 FA) 1000 MG CAPS Take 1,000 mg by mouth every morning.       No current [facility]-ordered facility-administered medications on file.        Allergies:  Latex; Losartan; and Tetracycline    Health Maintenance: Completed    ROS:  Gen: no fevers/chill, no changes in weight  Eyes: no changes in vision  ENT: no sore throat, no hearing loss, no bloody nose  Pulm: no sob, no cough  CV: no chest pain, no palpitations  GI: no nausea/vomiting, no diarrhea  : no dysuria  MSk: no myalgias  Skin: no rash  Neuro: no headaches, no numbness/tingling  Heme/Lymph: no easy bruising      Objective:       Exam:  /74 (BP Location: Left arm, Patient Position: Sitting, BP Cuff Size: Adult)   Pulse (!) 50   Temp 36.1 °C (96.9 °F) (Temporal)   Ht 1.702 m (5' 7\")   Wt 63.2 kg (139 lb 5.3 oz)   SpO2 96%   BMI 21.82 kg/m²  Body mass index is 21.82 kg/m².    General: Normal appearing. No distress.  HEAD: NCAT  EYES: conjunctiva clear, lids without ptosis, pupils equal and reactive to light  EARS: ears normal shape and contour.  MOUTH: normal dentition   Neck:  Normal ROM  Pulmonary: Normal effort. Normal respiratory " rate.  Cardiovascular: Well perfused. No LE edema  Neurologic: Grossly normal, no focal deficits  Skin: Warm and dry.  No obvious lesions.  Musculoskeletal: Normal gait and station.   Psych: Normal mood and affect. Alert and oriented x3. Judgment and insight is normal.      Labs: 7/26/2019 results reviewed and discussed with the patient, questions answered.    Assessment & Plan:     69 y.o. female with the following -     1. Age-related osteoporosis without current pathological fracture  Chronic problem.  Order placed for Prolia, fax to the infusion center today.  And to continue Prolia for at least 1 more year.  Then will plan to repeat DEXA scan.  - denosumab (PROLIA) 60 MG/ML Solution Prefilled Syringe; Inject 1 mL as instructed Once for 1 dose.  Dispense: 1 mL; Refill: 0    2. Chronic pain of right knee  Chronic problem, improving with physical therapy, plan to continue.    3. History of ovarian cancer  Chronic problem.  Continues to follow with oncologist at [facility].    Return if symptoms worsen or fail to improve.    Please note that this dictation was created using voice recognition software. I have made every reasonable attempt to correct obvious errors, but I expect that there are errors of grammar and possibly content that I did not discover before finalizing the note.

## 2019-09-20 NOTE — ASSESSMENT & PLAN NOTE
Chronic problem.  The patient is here today because she supposed to get her Prolia infusion however, since Dr. Garza left the infusion center would not honor the prescription.  Patient states that she has been on Prolia for the past 4 years.  She supposed to be on it for 1 more year.  She has a history of ovarian cancer.  She sees an oncologist at [facility].

## 2019-09-23 ENCOUNTER — OUTPATIENT INFUSION SERVICES (OUTPATIENT)
Dept: ONCOLOGY | Facility: MEDICAL CENTER | Age: 69
End: 2019-09-23
Attending: INTERNAL MEDICINE
Payer: MEDICARE

## 2019-09-23 VITALS
SYSTOLIC BLOOD PRESSURE: 123 MMHG | BODY MASS INDEX: 22.08 KG/M2 | WEIGHT: 140.65 LBS | DIASTOLIC BLOOD PRESSURE: 76 MMHG | TEMPERATURE: 97.2 F | HEART RATE: 54 BPM | HEIGHT: 67 IN | RESPIRATION RATE: 18 BRPM | OXYGEN SATURATION: 98 %

## 2019-09-23 DIAGNOSIS — M81.0 AGE-RELATED OSTEOPOROSIS WITHOUT CURRENT PATHOLOGICAL FRACTURE: ICD-10-CM

## 2019-09-23 LAB
CA-I BLD ISE-SCNC: 1.22 MMOL/L (ref 1.1–1.3)
CREAT BLD-MCNC: 1.3 MG/DL (ref 0.5–1.4)

## 2019-09-23 PROCEDURE — 82330 ASSAY OF CALCIUM: CPT

## 2019-09-23 PROCEDURE — 700111 HCHG RX REV CODE 636 W/ 250 OVERRIDE (IP): Mod: JG | Performed by: FAMILY MEDICINE

## 2019-09-23 PROCEDURE — 82565 ASSAY OF CREATININE: CPT

## 2019-09-23 PROCEDURE — 36415 COLL VENOUS BLD VENIPUNCTURE: CPT

## 2019-09-23 PROCEDURE — 96372 THER/PROPH/DIAG INJ SC/IM: CPT

## 2019-09-23 RX ADMIN — DENOSUMAB 60 MG: 60 INJECTION SUBCUTANEOUS at 15:20

## 2019-09-23 NOTE — PROGRESS NOTES
Pt returns to infusion center for scheduled Prolia injection.  Labs drawn via #23 butterfly needle to Rt AC; pt tolerated well.  Results reviewed.  Pt meets criteria for Prolia today.  Injection given without incident; pt tolerated well.  Pt left infusion center ambulatory and in good condition.  Message left with schedulers to make return appointment.

## 2019-09-23 NOTE — PROGRESS NOTES
Pharmacy note  Cr = 1.3 mg/dL, CrCl ~ 42 ml/min  Ionized Ca = 1.22    Las Prolia 03/13/19  OK for denosumab (Prolia) 60 mg SQ today    Soheila Neville, PharmD, BCOP

## 2019-09-24 ENCOUNTER — PHYSICAL THERAPY (OUTPATIENT)
Dept: PHYSICAL THERAPY | Facility: REHABILITATION | Age: 69
End: 2019-09-24
Attending: INTERNAL MEDICINE
Payer: MEDICARE

## 2019-09-24 DIAGNOSIS — M25.561 CHRONIC PAIN OF RIGHT KNEE: ICD-10-CM

## 2019-09-24 DIAGNOSIS — G89.29 CHRONIC PAIN OF RIGHT KNEE: ICD-10-CM

## 2019-09-24 PROCEDURE — 97112 NEUROMUSCULAR REEDUCATION: CPT

## 2019-09-24 NOTE — OP THERAPY DAILY TREATMENT
"  Outpatient Physical Therapy  DAILY TREATMENT     [facility] Outpatient Physical Therapy [address] [facility], [address]  [place] [address]  Phone:  [phone]    Date: 09/24/2019    Patient: Padmini Paz  YOB: 1950  MRN: [id number]     Time Calculation  Start time: 1035  Stop time: 1110 Time Calculation (min): 35 minutes       Chief Complaint: Knee Problem    Visit #: 5    SUBJECTIVE:  Felt a lot better with tape getting up and down from chair.     OBJECTIVE:  Current objective measures: Pain with rising from chair// no pain          Therapeutic Treatments and Modalities:     1. Neuromuscular Re-education (CPT 48891), Leukotape for R patellar float., 4\" step forward and backward, Ball wall squats x 20, Ball bridges 5 sec x 20, Reverse BOSU for static stand, lateral WS, minisquats with min UE A.  BOSU alt lunges without UE A, Shuttle B leg press 4 cords x 25, SL press B on rocker board x 15 each., Bike x 5'    Time-based treatments/modalities:  Neuromusc re-ed, balance, coor, post minutes (CPT 79627): 35 minutes       Pain rating before treatment: 3  Pain rating after treatment: 0    ASSESSMENT:   Response to treatment: Taping provides patellar femoral relief and enables progression of closed chain exercises.  Continues to lack R knee proprioception.     PLAN/RECOMMENDATIONS:   Plan for treatment: therapy treatment to continue next visit.  Planned interventions for next visit: continue with current treatment. Progress R knee proprioception.       "

## 2019-09-26 ENCOUNTER — HOSPITAL ENCOUNTER (OUTPATIENT)
Dept: RADIOLOGY | Facility: MEDICAL CENTER | Age: 69
End: 2019-09-26
Attending: INTERNAL MEDICINE
Payer: MEDICARE

## 2019-09-26 ENCOUNTER — PHYSICAL THERAPY (OUTPATIENT)
Dept: PHYSICAL THERAPY | Facility: REHABILITATION | Age: 69
End: 2019-09-26
Attending: INTERNAL MEDICINE
Payer: MEDICARE

## 2019-09-26 DIAGNOSIS — R92.2 DENSE BREAST: ICD-10-CM

## 2019-09-26 DIAGNOSIS — M25.561 CHRONIC PAIN OF RIGHT KNEE: ICD-10-CM

## 2019-09-26 DIAGNOSIS — Z12.31 ENCOUNTER FOR SCREENING MAMMOGRAM FOR BREAST CANCER: ICD-10-CM

## 2019-09-26 DIAGNOSIS — R92.30 DENSE BREAST: ICD-10-CM

## 2019-09-26 DIAGNOSIS — G89.29 CHRONIC PAIN OF RIGHT KNEE: ICD-10-CM

## 2019-09-26 PROCEDURE — 76641 ULTRASOUND BREAST COMPLETE: CPT

## 2019-09-26 PROCEDURE — 77063 BREAST TOMOSYNTHESIS BI: CPT

## 2019-09-26 PROCEDURE — 97112 NEUROMUSCULAR REEDUCATION: CPT

## 2019-09-26 NOTE — OP THERAPY DAILY TREATMENT
"  Outpatient Physical Therapy  DAILY TREATMENT     [facility] Outpatient Physical Therapy [place] [address] [facility], [address]  [place] [address]  Phone:  [phone]    Date: 09/26/2019    Patient: Padmini Paz  YOB: 1950  MRN: [id number]     Time Calculation  Start time: 1100  Stop time: 1145 Time Calculation (min): 45 minutes       Chief Complaint: Knee Problem    Visit #: 6    SUBJECTIVE:  Had more difficulty getting out of a chair after last session. Learning good exercises and B knees are feeling stronger. Feeling 50-80% doing stairs. Turning in bed is much better.  Getting easier to get off toilet 50% better.      OBJECTIVE:  Current objective measures: L hip strength: ABD: 4+, hip ext: 4+, PKB: 5          Therapeutic Treatments and Modalities:     1. Neuromuscular Re-education (CPT 29095), Standing B hip abd, ext x 20 each, 4\" step forward and backward, Bike x 5', Ball bridges 5 sec x 20, Looked at gym equipment on her phone and educated in things she could do for leg press and hip strengthening., Shuttle B leg press 4 cords x 20, SL press L 2 cords     7. Manual Therapy, IASTM R patellar tendon, R VMO// no pain with squat    Time-based treatments/modalities:  Manual therapy minutes (CPT 49183): 5 minutes  Neuromusc re-ed, balance, coor, post minutes (CPT 12901): 40 minutes       Pain rating before treatment: 3  Pain rating after treatment: 0    ASSESSMENT:   Response to treatment: making good gains in hip strength and functional improvement.  Will decrease freq to 1x/wk for 2-4 weeks.   PLAN/RECOMMENDATIONS:   Plan for treatment: therapy treatment to continue next visit.  Planned interventions for next visit: continue with current treatment. Progress closed chain B LE loading.       "

## 2019-09-30 ENCOUNTER — PHYSICAL THERAPY (OUTPATIENT)
Dept: PHYSICAL THERAPY | Facility: REHABILITATION | Age: 69
End: 2019-09-30
Attending: INTERNAL MEDICINE
Payer: MEDICARE

## 2019-09-30 DIAGNOSIS — M25.561 CHRONIC PAIN OF RIGHT KNEE: ICD-10-CM

## 2019-09-30 DIAGNOSIS — G89.29 CHRONIC PAIN OF RIGHT KNEE: ICD-10-CM

## 2019-09-30 PROCEDURE — 97112 NEUROMUSCULAR REEDUCATION: CPT

## 2019-09-30 NOTE — OP THERAPY DAILY TREATMENT
"  Outpatient Physical Therapy  DAILY TREATMENT     [facility] Outpatient Physical Therapy [address] [facility], [address]  [place] [address]  Phone:  [phone]    Date: 09/30/2019    Patient: Padmini Paz  YOB: 1950  MRN: [id number]     Time Calculation  Start time: 1400  Stop time: 1445 Time Calculation (min): 45 minutes       Chief Complaint: Knee Problem    Visit #: 7    SUBJECTIVE:  Doing well overall.  Still some minor pain with rising.  Was able to do squats in exercise class without pain today.  That hasn't happened in some time. Not waking anymore.     OBJECTIVE:  Current objective measures: R hip ER:  57 degrees.  R hip strength:  4+          Therapeutic Treatments and Modalities:     1. Neuromuscular Re-education (CPT 69425), Bike x 5', 4\" step forward and backward, Ball wall squats x 20, Ball bridges 5 sec x 20, Reverse BOSU for static stand, lateral WS, minisquats with min UE A.  BOSU alt lunges without UE A, B SL hip abd 10 x 2, Bike x 5'    8. Manual:  IASTM to R patellar insertion, VMO and quad insertion    Time-based treatments/modalities:  Manual therapy minutes (CPT 48377): 5 minutes  Neuromusc re-ed, balance, coor, post minutes (CPT 08719): 40 minutes       Pain rating before treatment: 0  Pain rating after treatment: 0    ASSESSMENT:   Response to treatment: Good strength and R hip ROM gains.  Function greatly improved.  Will do HEP for 3 weeks and return for progression only if needed. Otherwise, if no return in 30 days will assume pt is able to manage with HEP.     PLAN/RECOMMENDATIONS:   Plan for treatment: therapy treatment to continue next visit.  Planned interventions for next visit: continue with current treatment. Return 1x for HEP progression.        "

## 2019-10-07 ENCOUNTER — APPOINTMENT (OUTPATIENT)
Dept: PHYSICAL THERAPY | Facility: REHABILITATION | Age: 69
End: 2019-10-07
Attending: INTERNAL MEDICINE
Payer: MEDICARE

## 2019-10-17 ENCOUNTER — APPOINTMENT (OUTPATIENT)
Dept: PHYSICAL THERAPY | Facility: REHABILITATION | Age: 69
End: 2019-10-17
Attending: INTERNAL MEDICINE
Payer: MEDICARE

## 2019-10-18 ENCOUNTER — NON-PROVIDER VISIT (OUTPATIENT)
Dept: MEDICAL GROUP | Facility: MEDICAL CENTER | Age: 69
End: 2019-10-18
Payer: MEDICARE

## 2019-10-18 DIAGNOSIS — Z23 NEED FOR VACCINATION: ICD-10-CM

## 2019-10-18 PROCEDURE — 90662 IIV NO PRSV INCREASED AG IM: CPT | Performed by: PHYSICIAN ASSISTANT

## 2019-10-18 PROCEDURE — G0008 ADMIN INFLUENZA VIRUS VAC: HCPCS | Performed by: PHYSICIAN ASSISTANT

## 2019-10-18 PROCEDURE — 99999 PR NO CHARGE: CPT | Performed by: PHYSICIAN ASSISTANT

## 2019-10-18 NOTE — NON-PROVIDER
"Padmini Paz is a 69 y.o. female here for a non-provider visit for:   FLU    Reason for immunization: Annual Flu Vaccine  Immunization records indicate need for vaccine: Yes, confirmed with SRIKANTH Han  Minimum interval has been met for this vaccine: Yes  ABN completed: Not Indicated    Order and dose verified by: OLIVIA  VIS Dated  8/15/19 was given to patient: Yes  All IAC Questionnaire questions were answered \"No.\"    Patient tolerated injection and no adverse effects were observed or reported: Yes    Pt scheduled for next dose in series: Not Indicated    "

## 2019-10-21 ENCOUNTER — APPOINTMENT (OUTPATIENT)
Dept: PHYSICAL THERAPY | Facility: REHABILITATION | Age: 69
End: 2019-10-21
Attending: INTERNAL MEDICINE
Payer: MEDICARE

## 2019-10-21 RX ORDER — ESCITALOPRAM OXALATE 10 MG/1
TABLET ORAL
Qty: 90 TAB | Refills: 0 | Status: SHIPPED | OUTPATIENT
Start: 2019-10-21 | End: 2020-01-17

## 2019-11-04 ENCOUNTER — TELEPHONE (OUTPATIENT)
Dept: PHYSICAL THERAPY | Facility: REHABILITATION | Age: 69
End: 2019-11-04

## 2019-11-04 NOTE — OP THERAPY DISCHARGE SUMMARY
Outpatient Physical Therapy  DISCHARGE SUMMARY NOTE      [facility] [address], [address]  [place] [address]  Phone:  [phone]    Date of Visit: 11/04/2019    Patient: Padmini Paz  YOB: 1950  MRN: [id number]     Referring Provider: No referring provider defined for this encounter.   Referring Diagnosis No admission diagnoses are documented for this encounter.     Physical Therapy Occurrence Codes    Date of onset of impairment:  3/3/19   Date physical therapy care plan established or reviewed:  9/3/19   Date physical therapy treatment started:  9/3/19          Functional Assessment Used        Your patient is being discharged from Physical Therapy with the following comments:   · Goals met    Comments:  Pt was last seen on 9/30/2019 for her 7th visit of PT.  At that time she had made excellent gains and was able to squat in her exercise classes without pain and rising from a chair had greatly improved.  R hip strength was 4+/5 and R hip ER had improved to 57 degrees.  She was going to do HEP for 3-4 weeks and return for HEP progression.  She did not return.    Recommendations:  Will d/c as pt has not been seen in 30 days.  Will assume patient is able to manage sxs with HEP. Thank you for this referral.      Yadira Jaffe, PT, MPT, OCS    Date: 11/4/2019

## 2019-12-03 ENCOUNTER — OFFICE VISIT (OUTPATIENT)
Dept: DERMATOLOGY | Facility: IMAGING CENTER | Age: 69
End: 2019-12-03
Payer: MEDICARE

## 2019-12-03 DIAGNOSIS — Z80.8 FAMILY HISTORY OF MELANOMA: ICD-10-CM

## 2019-12-03 DIAGNOSIS — D22.9 MULTIPLE MELANOCYTIC NEVI: ICD-10-CM

## 2019-12-03 DIAGNOSIS — D48.5 NEOPLASM OF UNCERTAIN BEHAVIOR OF SKIN: ICD-10-CM

## 2019-12-03 DIAGNOSIS — L82.1 SEBORRHEIC KERATOSES: ICD-10-CM

## 2019-12-03 DIAGNOSIS — Z85.828 HISTORY OF NONMELANOMA SKIN CANCER: ICD-10-CM

## 2019-12-03 PROCEDURE — 99214 OFFICE O/P EST MOD 30 MIN: CPT | Mod: 25 | Performed by: DERMATOLOGY

## 2019-12-03 PROCEDURE — 11102 TANGNTL BX SKIN SINGLE LES: CPT | Performed by: DERMATOLOGY

## 2019-12-03 ASSESSMENT — ENCOUNTER SYMPTOMS
CHILLS: 0
FEVER: 0

## 2019-12-03 NOTE — PROGRESS NOTES
DERMATOLOGY NOTE  FOLLOW UP VISIT     12/03/19  Zena Paz  1950  MRN: [id number]     Chief complaint: No chief complaint on file.       Zena Paz is a 69 y.o. female who presents for full skin exam.  Was a [facility]it pt.  Family history of melanoma.  No new concerns today.      History of skin cancer: Yes, Details: BCC, can't remember where (5+ years ago at least)  History of precancers/actinic keratoses: Yes, Details: yes  History of biopsies:Yes, Details: moles, atypical  History of blistering/severe sunburns:Yes, Details: as a child  Family history of skin cancer:Yes, Details: Father and 3 siblings with melanoma's    Past Medical History:   Diagnosis Date   • Backpain     had back surgery, s1-l5 fusion   • Bowel obstruction (HCC)    • Cancer (HCC)     overian cancer   • Heart murmur    • Hypertension    • Indigestion     sometimes   • Other specified symptom associated with female genital organs     had ca ov the ovary   • Schwannoma of cranial nerve (HCC)         Family History   Problem Relation Age of Onset   • Heart Disease Mother    • Stroke Father         Social History     Socioeconomic History   • Marital status:      Spouse name: Not on file   • Number of children: Not on file   • Years of education: Not on file   • Highest education level: Not on file   Occupational History   • Not on file   Social Needs   • Financial resource strain: Not on file   • Food insecurity:     Worry: Not on file     Inability: Not on file   • Transportation needs:     Medical: Not on file     Non-medical: Not on file   Tobacco Use   • Smoking status: Never Smoker   • Smokeless tobacco: Never Used   Substance and Sexual Activity   • Alcohol use: Yes     Comment: occasional   • Drug use: No   • Sexual activity: Yes     Partners: Male     Birth control/protection: Post-Menopausal   Lifestyle   • Physical activity:     Days per week: Not on file     Minutes per session: Not on file   • Stress: Not on file    Relationships   • Social connections:     Talks on phone: Not on file     Gets together: Not on file     Attends Restorationism service: Not on file     Active member of club or organization: Not on file     Attends meetings of clubs or organizations: Not on file     Relationship status: Not on file   • Intimate partner violence:     Fear of current or ex partner: Not on file     Emotionally abused: Not on file     Physically abused: Not on file     Forced sexual activity: Not on file   Other Topics Concern   •  Service Not Asked   • Blood Transfusions Not Asked   • Caffeine Concern Not Asked   • Occupational Exposure Not Asked   • Hobby Hazards Not Asked   • Sleep Concern No   • Stress Concern Not Asked   • Weight Concern No   • Special Diet Not Asked   • Back Care Not Asked   • Exercise Yes   • Bike Helmet Not Asked   • Seat Belt Not Asked   • Self-Exams Not Asked   Social History Narrative   • Not on file        Allergies   Allergen Reactions   • Latex Rash   • Losartan      palpitations   • Tetracycline      Sensitivity to sun; sun burns.  Reaction date; 15 years ago from 2013.        MEDICATIONS:  Medications relevant to specialty reviewed.    Current Outpatient Medications:   •  escitalopram (LEXAPRO) 10 MG Tab, TAKE 1 TABLET BY MOUTH EVERY MORNING, Disp: 90 Tab, Rfl: 0  •  LORazepam (ATIVAN) 1 MG Tab, Take 1 mg by mouth., Disp: , Rfl:   •  ondansetron (ZOFRAN) 8 MG Tab, Take 8 mg by mouth., Disp: , Rfl:   •  atenolol (TENORMIN) 25 MG Tab, TAKE 1 TABLET BY MOUTH EVERY EVENING, Disp: 90 Tab, Rfl: 2  •  calcium citrate (CALCITRATE) 950 MG Tab, Take 950 mg by mouth every day., Disp: , Rfl:   •  polyethyl glycol-propyl glycol (SYSTANE) 0.4-0.3 % Solution, Place 1 Drop in both eyes every morning., Disp: , Rfl:   •  denosumab (PROLIA) 60 MG/ML Solution, Inject 60 mg as instructed every 6 months. Next dose due: 03/19/19, Disp: , Rfl:   •  vitamin D (CHOLECALCIFEROL) 1000 UNIT TABS, Take 2,000 Units by mouth  every day., Disp: , Rfl:   •  aspirin 81 MG EC tablet, Take 81 mg by mouth every day., Disp: , Rfl:   •  Multiple Vitamins-Minerals (SENIOR MULTIVITAMIN PLUS PO), Take 1 Tab by mouth every morning., Disp: , Rfl:   •  docosahexanoic acid (OMEGA 3 FA) 1000 MG CAPS, Take 1,000 mg by mouth every morning., Disp: , Rfl:      REVIEW OF SYSTEMS:   Positive for {ROS:09967}  Negative for {ROS:47587}  All other systems reviewed and are negative.     EXAM:  ***    {Exam:36898}          IMPRESSION / PLAN:        Skin cancer education  - discussed importance of sun protective clothing, eyewear  - discussed importance of daily use of broad spectrum sunscreen with SPF 30 or greater, as well as need for reapplication ~every 2 hours when exposed to UVR  - discussed importance of regular self-exams, ideally once per month, every {kmskinCAexam:84471} exams in clinic  - ABCDE's of melanoma discussed  - patient to bring any new or concerning lesions to my attention     Return to clinic in: No follow-ups on file. and as needed for any new or changing skin lesions.

## 2019-12-03 NOTE — PROGRESS NOTES
Dermatology New Patient Visit    Chief Complaint   Patient presents with   • Annual Exam     ROBERT       Subjective:     HPI:   eZna aPz is a 68 y.o. female presenting for       Zena Paloma Paz is a 69 y.o. female who presents for full skin exam.  Was a Ranjit pt.  Family history of melanoma.  No new concerns today. Denies new, growing, changing itching or bleeding skin lesions.        History of skin cancer: Yes, Details: BCC, can't remember where (5+ years ago at least)  History of precancers/actinic keratoses: Yes, Details: yes  History of biopsies:Yes, Details: moles, atypical  History of blistering/severe sunburns:Yes, Details: as a child  Family history of skin cancer:Yes, Details: Father and 3 siblings with melanoma's      Past Medical History:   Diagnosis Date   • Backpain     had back surgery, s1-l5 fusion   • Bowel obstruction (HCC)    • Cancer (HCC)     overian cancer   • Heart murmur    • Hypertension    • Indigestion     sometimes   • Other specified symptom associated with female genital organs     had ca ov the ovary   • Schwannoma of cranial nerve (HCC)        Current Outpatient Medications on File Prior to Visit   Medication Sig Dispense Refill   • polyethyl glycol-propyl glycol (SYSTANE) 0.4-0.3 % Solution Place 1 Drop in both eyes every morning.     • escitalopram (LEXAPRO) 10 MG Tab TAKE 1 TABLET BY MOUTH EVERY MORNING 90 Tab 0   • LORazepam (ATIVAN) 1 MG Tab Take 1 mg by mouth.     • ondansetron (ZOFRAN) 8 MG Tab Take 8 mg by mouth.     • atenolol (TENORMIN) 25 MG Tab TAKE 1 TABLET BY MOUTH EVERY EVENING 90 Tab 2   • calcium citrate (CALCITRATE) 950 MG Tab Take 950 mg by mouth every day.     • denosumab (PROLIA) 60 MG/ML Solution Inject 60 mg as instructed every 6 months. Next dose due: 03/19/19     • vitamin D (CHOLECALCIFEROL) 1000 UNIT TABS Take 2,000 Units by mouth every day.     • aspirin 81 MG EC tablet Take 81 mg by mouth every day.     • Multiple Vitamins-Minerals (SENIOR  MULTIVITAMIN PLUS PO) Take 1 Tab by mouth every morning.     • docosahexanoic acid (OMEGA 3 FA) 1000 MG CAPS Take 1,000 mg by mouth every morning.       No current facility-administered medications on file prior to visit.        Allergies   Allergen Reactions   • Latex Rash   • Losartan      palpitations   • Tetracycline      Sensitivity to sun; sun burns.  Reaction date; 15 years ago from 2013.       Family History   Problem Relation Age of Onset   • Heart Disease Mother    • Stroke Father        Social History     Socioeconomic History   • Marital status:      Spouse name: Not on file   • Number of children: Not on file   • Years of education: Not on file   • Highest education level: Not on file   Occupational History   • Not on file   Social Needs   • Financial resource strain: Not on file   • Food insecurity:     Worry: Not on file     Inability: Not on file   • Transportation needs:     Medical: Not on file     Non-medical: Not on file   Tobacco Use   • Smoking status: Never Smoker   • Smokeless tobacco: Never Used   Substance and Sexual Activity   • Alcohol use: Yes     Comment: occasional   • Drug use: No   • Sexual activity: Yes     Partners: Male     Birth control/protection: Post-Menopausal   Lifestyle   • Physical activity:     Days per week: Not on file     Minutes per session: Not on file   • Stress: Not on file   Relationships   • Social connections:     Talks on phone: Not on file     Gets together: Not on file     Attends [employer] service: Not on file     Active member of club or organization: Not on file     Attends meetings of clubs or organizations: Not on file     Relationship status: Not on file   • Intimate partner violence:     Fear of current or ex partner: Not on file     Emotionally abused: Not on file     Physically abused: Not on file     Forced sexual activity: Not on file   Other Topics Concern   •  Service Not Asked   • Blood Transfusions Not Asked   • Caffeine Concern  Not Asked   • Occupational Exposure Not Asked   • Hobby Hazards Not Asked   • Sleep Concern No   • Stress Concern Not Asked   • Weight Concern No   • Special Diet Not Asked   • Back Care Not Asked   • Exercise Yes   • Bike Helmet Not Asked   • Seat Belt Not Asked   • Self-Exams Not Asked   Social History Narrative   • Not on file       Review of Systems   Constitutional: Negative for chills and fever.   Skin: Negative for itching and rash.   All other systems reviewed and are negative.       Objective:     A full mucocutaneous exam was completed including: scalp, hair, ears, face, eyelids, conjunctiva, lips, gums/tongue/oropharynx, neck, chest breasts, abdomen, back, left and right upper extremities (including hands/digits and fingernails), left and right lower extremities (including feet/toes, toenails), buttocks, including external genitalia with the following pertinent findings listed below. Remaining above-listed examined areas within normal limits / negative for rashes or lesions.    There were no vitals taken for this visit.    Physical Exam   Constitutional: She is oriented to person, place, and time and well-developed, well-nourished, and in no distress.   HENT:   Head: Normocephalic and atraumatic.       Right Ear: External ear normal.   Left Ear: External ear normal.   Nose: Nose normal.   Mouth/Throat: Oropharynx is clear and moist.   Eyes: Conjunctivae and lids are normal.   Neck: Normal range of motion. Neck supple.   Cardiovascular: Intact distal pulses.   Pulmonary/Chest: Effort normal.   Neurological: She is alert and oriented to person, place, and time.   Skin: Skin is warm and dry.        Psychiatric: Mood and affect normal.   Vitals reviewed.      DATA: none applicable to review    Assessment and Plan:     1. Neoplasm of uncertain behavior of skin  - Discussed monitoring vs biopsy, patient agreeable to biopsy today, see procedure note below  - Biopsy Procedure Note: biopsy by shave technique  -  "Location: right arm (lateral proximal arm)  - Size: as noted in exam  - Total specimens sent for pathology: 1  - Photo taken with patient permission (see media tab)  - Preoperative diagnosis: r/o atypical melanocytic vs DF     Risks, benefits and alternatives of procedure discussed, verbal consent obtained for photo and written informed consent obtained for procedure. Time out completed. Area of biopsy prepped with alcohol. Anesthesia with 1% lidocaine with epinephrine administered with 30 gauge needle. Shave biopsy of the site performed. Hemostasis achieved with aluminum chloride. Vaseline applied to wound with bandage. Patient tolerated procedure well and there were no complications. The specimen was sent to the pathology lab by the staff. Wound care was discussed.'    2. Multiple melanocytic nevi  -Reviewed moles and melanoma. Patient advised to return sooner than scheduled if concerning changes in moles are noted.  -Reviewed sun protective behavior including sunscreen application and reapplication, appropriate choice of SPF, hat, protective clothing, seeking shade and never choosing to \"lie out\" in the sun.    3. Seborrheic keratoses  -nature of the diagnosis was discussed in detail  -clinically benign today on exam, reassurance was given  -continue to monitor for any changes, pt to call if any changes occur    4. History of nonmelanoma skin cancer  5. Family history of melanoma  Skin cancer education  - discussed importance of sun protective clothing, eyewear  - discussed importance of daily use of broad spectrum sunscreen with SPF 30 or greater, as well as need for reapplication ~every 2 hours when exposed to UVR  - discussed importance of regular self-exams, ideally once per month, every 12 months exams in clinic  - ABCDE's of melanoma discussed  - patient to bring any new or concerning lesions to my attention    Followup: Return in about 1 year (around 12/3/2020), or sooner pending path.    Aurora Cho, " M.D.

## 2019-12-09 ENCOUNTER — TELEPHONE (OUTPATIENT)
Dept: DERMATOLOGY | Facility: IMAGING CENTER | Age: 69
End: 2019-12-09

## 2019-12-09 NOTE — TELEPHONE ENCOUNTER
Phone Number Called: [phone] (home)       Call outcome: left message for patient to call back regarding message below    Message: Pt had identifier, left verbal message about results Results benign, no further treatment needed.   D-path was scanned in   To call back if any questions occur.

## 2020-01-17 RX ORDER — ATENOLOL 25 MG/1
TABLET ORAL
Qty: 90 TAB | Refills: 2 | Status: SHIPPED | OUTPATIENT
Start: 2020-01-17 | End: 2020-10-05

## 2020-01-17 RX ORDER — ESCITALOPRAM OXALATE 10 MG/1
TABLET ORAL
Qty: 90 TAB | Refills: 0 | Status: SHIPPED | OUTPATIENT
Start: 2020-01-17 | End: 2020-04-15

## 2020-01-17 NOTE — TELEPHONE ENCOUNTER
Was the patient seen in the last year in this department? Yes PT with a NP anya coming up on:  Future Appointments       Provider Department Center    2/18/2020 11:00 AM Martina Grajeda M.D. [facility] [address]    3/24/2020 2:30 PM INFUSION QUICK INJECT Infusion Services [facility]      Does patient have an active prescription for medications requested? No   Received Request Via: Pharmacy

## 2020-02-14 ENCOUNTER — OFFICE VISIT (OUTPATIENT)
Dept: MEDICAL GROUP | Facility: PHYSICIAN GROUP | Age: 70
End: 2020-02-14
Payer: MEDICARE

## 2020-02-14 VITALS
WEIGHT: 139.2 LBS | SYSTOLIC BLOOD PRESSURE: 102 MMHG | OXYGEN SATURATION: 98 % | DIASTOLIC BLOOD PRESSURE: 72 MMHG | HEIGHT: 67 IN | HEART RATE: 66 BPM | TEMPERATURE: 97.3 F | RESPIRATION RATE: 20 BRPM | BODY MASS INDEX: 21.85 KG/M2

## 2020-02-14 DIAGNOSIS — R23.2 HOT FLASHES: ICD-10-CM

## 2020-02-14 DIAGNOSIS — Z85.43 HISTORY OF OVARIAN CANCER: ICD-10-CM

## 2020-02-14 DIAGNOSIS — Z02.89 ENCOUNTER FOR COMPLETION OF FORM WITH PATIENT: ICD-10-CM

## 2020-02-14 DIAGNOSIS — M81.0 AGE-RELATED OSTEOPOROSIS WITHOUT CURRENT PATHOLOGICAL FRACTURE: Primary | ICD-10-CM

## 2020-02-14 DIAGNOSIS — I10 ESSENTIAL HYPERTENSION: ICD-10-CM

## 2020-02-14 DIAGNOSIS — G62.9 NEUROPATHY: ICD-10-CM

## 2020-02-14 DIAGNOSIS — N18.30 CHRONIC KIDNEY DISEASE, STAGE 3, MOD DECREASED GFR: ICD-10-CM

## 2020-02-14 DIAGNOSIS — K86.89 PANCREATIC INSUFFICIENCY: ICD-10-CM

## 2020-02-14 PROBLEM — D13.2 DUODENAL ADENOMA: Status: ACTIVE | Noted: 2017-06-07

## 2020-02-14 PROBLEM — Z87.19 HISTORY OF SMALL BOWEL OBSTRUCTION: Status: ACTIVE | Noted: 2018-08-20

## 2020-02-14 PROBLEM — F51.01 PRIMARY INSOMNIA: Status: RESOLVED | Noted: 2018-04-05 | Resolved: 2020-02-14

## 2020-02-14 PROCEDURE — 99214 OFFICE O/P EST MOD 30 MIN: CPT | Performed by: FAMILY MEDICINE

## 2020-02-14 SDOH — HEALTH STABILITY: MENTAL HEALTH: HOW OFTEN DO YOU HAVE A DRINK CONTAINING ALCOHOL?: 2-4 TIMES A MONTH

## 2020-02-14 SDOH — HEALTH STABILITY: MENTAL HEALTH: HOW MANY STANDARD DRINKS CONTAINING ALCOHOL DO YOU HAVE ON A TYPICAL DAY?: 1 OR 2

## 2020-02-14 SDOH — HEALTH STABILITY: MENTAL HEALTH: HOW OFTEN DO YOU HAVE 6 OR MORE DRINKS ON ONE OCCASION?: NEVER

## 2020-02-14 ASSESSMENT — PATIENT HEALTH QUESTIONNAIRE - PHQ9: CLINICAL INTERPRETATION OF PHQ2 SCORE: 0

## 2020-02-14 NOTE — ASSESSMENT & PLAN NOTE
This is a chronic condition. This was also precipitated by chemotherapy. She feels it mostly in her feet and occasionally her hands. It mostly bothers her at night. There is no pain, just tingling. She did try gabapentin but it made her dizzy.

## 2020-02-14 NOTE — ASSESSMENT & PLAN NOTE
This is a chronic condition. Precipitated by chemo. She follows with nephrology at [facility] (Dr. Marcelo Bucio). She sees him yearly who is managing the condition.

## 2020-02-14 NOTE — ASSESSMENT & PLAN NOTE
This is a chronic condition. She did get hot flashes and was started on lexapro which helps her mood as well.

## 2020-02-14 NOTE — ASSESSMENT & PLAN NOTE
This is a chronic condition. She had hot flashes with menopause and was started on lexapro which works well to control her symptoms. It also helps with the anxiety/stress of cancer.

## 2020-02-14 NOTE — ASSESSMENT & PLAN NOTE
This is a chronic condition. She doesn't know much about this condition and states no one has discussed with her this condition. She does note since her cancer treatments she will get intermittent diarrhea. She did see GI to figure out the cause but she reports the test showed nothing. She takes metamucil regularly is very helpful but will still get periodic diarrhea.

## 2020-02-14 NOTE — ASSESSMENT & PLAN NOTE
This is a chronic condition. She states the chemo she went through caused her osteoporosis. She is currently on prolia injections. Her endocrinologist in [facility] (Dr. Evans) wants to wean her off prolia and put her on fosamax. She sees him in 3/2020.

## 2020-02-14 NOTE — ASSESSMENT & PLAN NOTE
This is a chronic condition.  Current Meds: atenolol 25 mg daily  Side effects: none  Home BP Log: none  Associated symptoms: no cp, no sob

## 2020-02-14 NOTE — PROGRESS NOTES
Subjective:     CC:  Diagnoses of Age-related osteoporosis without current pathological fracture, Chronic kidney disease, stage 3, mod decreased GFR (HCC), Essential hypertension, Neuropathy (HCC), History of ovarian cancer, Hot flashes, Pancreatic insufficiency, History of coccidioidomycosis, and Duodenal adenoma were pertinent to this visit.    HISTORY OF THE PRESENT ILLNESS: Patient is a 69 y.o. female. This pleasant patient is here today to establish care. Her prior PCP was Tammy Garza.    Age-related osteoporosis without current pathological fracture  This is a chronic condition. She states the chemo she went through caused her osteoporosis. She is currently on prolia injections. Her endocrinologist in [facility] (Dr. Evans) wants to wean her off prolia and put her on fosamax. She sees him in 3/2020.    Chronic kidney disease, stage 3, mod decreased GFR  This is a chronic condition. Precipitated by chemo. She follows with nephrology at [facility] (Dr. Marcelo Bucio). She sees him yearly who is managing the condition.     HTN (hypertension)  This is a chronic condition.  Current Meds: atenolol 25 mg daily  Side effects: none  Home BP Log: none  Associated symptoms: no cp, no sob      Neuropathy (HCC)  This is a chronic condition. This was also precipitated by chemotherapy. She feels it mostly in her feet and occasionally her hands. It mostly bothers her at night. There is no pain, just tingling. She did try gabapentin but it made her dizzy.     Hot flashes  This is a chronic condition. She had hot flashes with menopause and was started on lexapro which works well to control her symptoms. It also helps with the anxiety/stress of cancer.    Pancreatic insufficiency  This is a chronic condition. She doesn't know much about this condition and states no one has discussed with her this condition. She does note since her cancer treatments she will get intermittent diarrhea. She did see GI to figure out the cause but she reports  the test showed nothing. She takes metamucil regularly is very helpful but will still get periodic diarrhea.       Allergies: Latex; Losartan; and Tetracycline    Current Outpatient Medications Ordered in Epic   Medication Sig Dispense Refill   • escitalopram (LEXAPRO) 10 MG Tab TAKE 1 TABLET BY MOUTH EVERY MORNING 90 Tab 0   • atenolol (TENORMIN) 25 MG Tab TAKE 1 TABLET BY MOUTH EVERY EVENING 90 Tab 2   • LORazepam (ATIVAN) 1 MG Tab Take 1 mg by mouth.     • ondansetron (ZOFRAN) 8 MG Tab Take 8 mg by mouth.     • calcium citrate (CALCITRATE) 950 MG Tab Take 950 mg by mouth every day.     • polyethyl glycol-propyl glycol (SYSTANE) 0.4-0.3 % Solution Place 1 Drop in both eyes every morning.     • denosumab (PROLIA) 60 MG/ML Solution Inject 60 mg as instructed every 6 months. Next dose due: 03/19/19     • vitamin D (CHOLECALCIFEROL) 1000 UNIT TABS Take 2,000 Units by mouth every day.     • aspirin 81 MG EC tablet Take 81 mg by mouth every day.     • Multiple Vitamins-Minerals (SENIOR MULTIVITAMIN PLUS PO) Take 1 Tab by mouth every morning.     • docosahexanoic acid (OMEGA 3 FA) 1000 MG CAPS Take 1,000 mg by mouth every morning.       No current [facility]-ordered facility-administered medications on file.        Past Medical History:   Diagnosis Date   • Backpain     had back surgery, s1-l5 fusion   • Bowel obstruction (HCC)    • Cancer (HCC)     overian cancer   • Heart murmur    • Hypertension    • Indigestion     sometimes   • Other specified symptom associated with female genital organs     had ca ov the ovary   • Schwannoma of cranial nerve (HCC)        Past Surgical History:   Procedure Laterality Date   • HYSTERECTOMY RADICAL  2012   • OOPHORECTOMY Bilateral 2012   • LUMBAR FUSION POSTERIOR  2001   • CRANIOTOMY  1993    Schwannoma L 5th CN       Social History     Tobacco Use   • Smoking status: Never Smoker   • Smokeless tobacco: Never Used   Substance Use Topics   • Alcohol use: Yes     Frequency: 2-4 times a month  "    Drinks per session: 1 or 2     Binge frequency: Never     Comment: 2 drinks per week on average    • Drug use: No       Social History     Social History Narrative   • Not on file       Family History   Problem Relation Age of Onset   • Heart Disease Mother    • Stroke Father        Health Maintenance:   Review records    ROS:   Gen: no fevers/chills, no changes in weight  Eyes: no changes in vision  ENT: no sore throat  Pulm: no sob  CV: no chest pain  GI: no nausea/vomiting  : no dysuria  MSk: no myalgias  Skin: no rash  Neuro: no headaches      Objective:     Exam: /72 (BP Location: Left arm, Patient Position: Sitting, BP Cuff Size: Adult)   Pulse 66   Temp 36.3 °C (97.3 °F) (Temporal)   Resp 20   Ht 1.702 m (5' 7\")   Wt 63.1 kg (139 lb 3.2 oz)   SpO2 98%  Body mass index is 21.8 kg/m².    General: Normal appearing. No distress.  HEENT: Normocephalic. Eyes conjunctiva clear lids without ptosis, pupils equal and reactive to light accommodation, ears normal shape and contour, canals are clear bilaterally, tympanic membranes are benign, oropharynx is without erythema, edema or exudates.   Neck: Supple without JVD. Thyroid is not enlarged.  Pulmonary: Clear to ausculation.  Normal effort. No rales, ronchi, or wheezing.  Cardiovascular: Regular rate and rhythm without murmur. Carotid and radial pulses are intact and equal bilaterally.  Abdomen: Soft, nontender, nondistended. Normal bowel sounds. Liver and spleen are not palpable  Neurologic: Grossly nonfocal  Lymph: No cervical or supraclavicular lymph nodes are palpable  Skin: Warm and dry.  No obvious lesions.  Musculoskeletal: Normal gait. No extremity cyanosis, clubbing, or edema.  Psych: Normal mood and affect. Alert and oriented x3. Judgment and insight is normal.    Assessment & Plan:   69 y.o. female with the following -    1. Age-related osteoporosis without current pathological fracture  This is a chronic condition, stable.  She reports " that the osteoporosis was precipitated by the chemotherapy for ovarian cancer.  She is currently on Prolia injections and follows with endocrinology at [facility].  She reports that he wants to wean her off the Prolia and then put her on Fosamax and she is going to see him in March, 2020 to determine how they do this.  -Continue to follow with endocrinology    2. Chronic kidney disease, stage 3, mod decreased GFR (HCC)  This is a chronic condition, stable.  This was also precipitated by her chemotherapy.  She does follow with nephrology at [facility] regularly and avoids nephrotoxic agents.    -Continue to follow with nephrology    3. Essential hypertension  This is a chronic condition, controlled.  She is on atenolol and tolerating it well with any side effect.  Blood pressures well controlled in the office today.  -Continue atenolol 25 mg daily    4. Neuropathy (HCC)  This is a chronic condition, stable.  This condition was also precipitated by chemotherapy and she gets tingling in her feet almost constantly though she is able to ignore it throughout the day.  At what night it will bother her most.  Sometimes she gets tingling in her hands.  She has tried gabapentin in the past but it made her dizzy and she cannot tolerate it.  We did briefly discuss switching her Lexapro to duloxetine but as there is no pain she wants to stick with the Lexapro for now.    5. Hot flashes  This is a chronic condition, controlled.  She developed hot flashes with menopause him start on Lexapro to help with hot flashes.  She reports that it works very well to control the hot flashes and also helps to deal with any anxiety or stress related to her cancer diagnosis or with friends who also have cancer who are passing away.    6. Pancreatic insufficiency  This is a chronic condition.  She was not aware of this diagnosis and reports that no providers discussed pancreatic insufficiency with her.  She is not on pancreatic enzymes.  I think this  diagnosis came about due to diarrhea a few years ago and she states that now that she takes Metamucil regularly the diarrhea problem is not as often as it used to be though she still will get episodes of diarrhea.    7. Encounter for completion of form with patient  She has a form to go to cancer camp.  She states she goes every year to go see friends who also have ovarian cancer and she just needs the form filled out by physician today.  Form was filled out and scanned into the chart.      Return for f/u allergies.    Please note that this dictation was created using voice recognition software. I have made every reasonable attempt to correct obvious errors, but I expect that there are errors of grammar and possibly content that I did not discover before finalizing the note.

## 2020-02-24 ENCOUNTER — OFFICE VISIT (OUTPATIENT)
Dept: MEDICAL GROUP | Facility: PHYSICIAN GROUP | Age: 70
End: 2020-02-24
Payer: MEDICARE

## 2020-02-24 VITALS
DIASTOLIC BLOOD PRESSURE: 74 MMHG | HEIGHT: 67 IN | SYSTOLIC BLOOD PRESSURE: 118 MMHG | HEART RATE: 64 BPM | BODY MASS INDEX: 22.22 KG/M2 | WEIGHT: 141.6 LBS | RESPIRATION RATE: 16 BRPM | TEMPERATURE: 97.9 F | OXYGEN SATURATION: 94 %

## 2020-02-24 DIAGNOSIS — Z91.09 ENVIRONMENTAL ALLERGIES: ICD-10-CM

## 2020-02-24 PROCEDURE — 99212 OFFICE O/P EST SF 10 MIN: CPT | Performed by: FAMILY MEDICINE

## 2020-02-24 NOTE — PATIENT INSTRUCTIONS
Dry nose:  - humidifier  - Ponaris  - Ayr saline gel    Allergies:  - Number 1 recommended treatment is flonase (fluticasone) 1 spray per nostril  - you could try allegra 60 mg daily, xyzal 2.5 mg daily, claritin 10 mg daily, zyrtec 5 mg daily  - if you use any of these pills, do not get the version (- D)  - as needed for an allergy attack: benadryl  - you could consider NeilMed sinus rinses (distilled water or boiled water you've let cool)  - If you do sinus rinse and flonase, do the sinus rinse first

## 2020-02-24 NOTE — PROGRESS NOTES
Subjective:     CC: allergies    HPI:   Zena presents today with     Environmental allergies  This is a chronic condition.  Onset: early 2000s  Quality: rhinorrhea, itchy/watery eyes, sneezing, congestion  Associated symptoms: R eye dry  Triggers: ?cleaning products  Previously tried meds: loratadine, saline nasal spray, systane (R eye)    Younger brother just recently had lots of test for his allergies. She wonders if her h/o cocci contributes to her symptoms. Worse since moving to [place]      Past Medical History:   Diagnosis Date   • Backpain     had back surgery, s1-l5 fusion   • Bowel obstruction (HCC)    • Cancer (HCC)     overian cancer   • Heart murmur    • Hypertension    • Indigestion     sometimes   • Other specified symptom associated with female genital organs     had ca ov the ovary   • Schwannoma of cranial nerve (HCC)        Social History     Tobacco Use   • Smoking status: Never Smoker   • Smokeless tobacco: Never Used   Substance Use Topics   • Alcohol use: Yes     Frequency: 2-4 times a month     Drinks per session: 1 or 2     Binge frequency: Never     Comment: 2 drinks per week on average    • Drug use: No       Current Outpatient Medications Ordered in Epic   Medication Sig Dispense Refill   • escitalopram (LEXAPRO) 10 MG Tab TAKE 1 TABLET BY MOUTH EVERY MORNING 90 Tab 0   • atenolol (TENORMIN) 25 MG Tab TAKE 1 TABLET BY MOUTH EVERY EVENING 90 Tab 2   • LORazepam (ATIVAN) 1 MG Tab Take 1 mg by mouth.     • ondansetron (ZOFRAN) 8 MG Tab Take 8 mg by mouth.     • calcium citrate (CALCITRATE) 950 MG Tab Take 950 mg by mouth every day.     • polyethyl glycol-propyl glycol (SYSTANE) 0.4-0.3 % Solution Place 1 Drop in both eyes every morning.     • denosumab (PROLIA) 60 MG/ML Solution Inject 60 mg as instructed every 6 months. Next dose due: 03/19/19     • vitamin D (CHOLECALCIFEROL) 1000 UNIT TABS Take 2,000 Units by mouth every day.     • aspirin 81 MG EC tablet Take 81 mg by mouth every day.     •  "Multiple Vitamins-Minerals (SENIOR MULTIVITAMIN PLUS PO) Take 1 Tab by mouth every morning.     • docosahexanoic acid (OMEGA 3 FA) 1000 MG CAPS Take 1,000 mg by mouth every morning.       No current [facility]-ordered facility-administered medications on file.        Allergies:  Latex; Losartan; and Tetracycline    Health Maintenance: Completed    ROS:  Gen: no fevers/chills  Pulm: no sob  CV: no chest pain    Objective:     Exam:  /74 (BP Location: Right arm, Patient Position: Sitting, BP Cuff Size: Adult)   Pulse 64   Temp 36.6 °C (97.9 °F) (Temporal)   Resp 16   Ht 1.702 m (5' 7\")   Wt 64.2 kg (141 lb 9.6 oz)   SpO2 94%   BMI 22.18 kg/m²  Body mass index is 22.18 kg/m².    Gen: Alert and oriented, No apparent distress.  ENT: PERRLA, TMs normal bilaterally, nares patent with no discharge, orpharynx clear without erythema or exudate.  Neck: Neck is supple without lymphadenopathy.  Lungs: Normal effort, CTA bilaterally, no wheezes, rhonchi, or rales  CV: Regular rate and rhythm. No murmurs, rubs, or gallops.  Ext: No clubbing, cyanosis, edema.    Assessment & Plan:     69 y.o. female with the following -     1. Environmental allergies  This is a chronic condition.  She reports since moving to [place] 8 years ago she developed worsened environmental allergies.  It seems particularly bad this winter but has been a warm winter.  She has tried Claritin without too much relief and has tried a saline nasal spray but only did that recently and she is not certain is helping yet.  She will use Systane eyedrops to help with a dry right eye.  -Flonase 1 spray per nostril daily  -She can add either Zyrtec, Allegra, Xyzal, or Claritin to the regimen.  I did provide to the appropriate dosing for her renal function.  -We also discussed using saline nasal rinses to see if it would help    Return in about 4 weeks (around 3/23/2020) for f/u allergiers.    Please note that this dictation was created using voice recognition " software. I have made every reasonable attempt to correct obvious errors, but I expect that there are errors of grammar and possibly content that I did not discover before finalizing the note.

## 2020-02-24 NOTE — ASSESSMENT & PLAN NOTE
This is a chronic condition.  Onset: early 2000s  Quality: rhinorrhea, itchy/watery eyes, sneezing, congestion  Associated symptoms: R eye dry  Triggers: ?cleaning products  Previously tried meds: loratadine, saline nasal spray, systane (R eye)    Younger brother just recently had lots of test for his allergies. She wonders if her h/o cocci contributes to her symptoms. Worse since moving to [place]

## 2020-03-14 ENCOUNTER — HOSPITAL ENCOUNTER (OUTPATIENT)
Dept: LAB | Facility: MEDICAL CENTER | Age: 70
End: 2020-03-14
Attending: OBSTETRICS & GYNECOLOGY
Payer: MEDICARE

## 2020-03-14 LAB — CANCER AG125 SERPL-ACNC: 10.7 U/ML (ref 0–35)

## 2020-03-14 PROCEDURE — 86304 IMMUNOASSAY TUMOR CA 125: CPT

## 2020-03-14 PROCEDURE — 36415 COLL VENOUS BLD VENIPUNCTURE: CPT

## 2020-03-16 ENCOUNTER — OFFICE VISIT (OUTPATIENT)
Dept: MEDICAL GROUP | Facility: PHYSICIAN GROUP | Age: 70
End: 2020-03-16
Payer: MEDICARE

## 2020-03-16 VITALS
HEART RATE: 70 BPM | DIASTOLIC BLOOD PRESSURE: 74 MMHG | RESPIRATION RATE: 16 BRPM | OXYGEN SATURATION: 97 % | WEIGHT: 137.4 LBS | SYSTOLIC BLOOD PRESSURE: 110 MMHG | HEIGHT: 67 IN | BODY MASS INDEX: 21.56 KG/M2 | TEMPERATURE: 97.7 F

## 2020-03-16 DIAGNOSIS — M81.0 AGE-RELATED OSTEOPOROSIS WITHOUT CURRENT PATHOLOGICAL FRACTURE: ICD-10-CM

## 2020-03-16 DIAGNOSIS — Z91.09 ENVIRONMENTAL ALLERGIES: Primary | ICD-10-CM

## 2020-03-16 DIAGNOSIS — Z11.59 NEED FOR HEPATITIS C SCREENING TEST: ICD-10-CM

## 2020-03-16 DIAGNOSIS — H92.01 RIGHT EAR PAIN: ICD-10-CM

## 2020-03-16 PROCEDURE — 99214 OFFICE O/P EST MOD 30 MIN: CPT | Performed by: FAMILY MEDICINE

## 2020-03-16 RX ORDER — SODIUM CHLORIDE/ALOE VERA
GEL (GRAM) NASAL
COMMUNITY

## 2020-03-16 RX ORDER — FLUTICASONE PROPIONATE 50 MCG
1 SPRAY, SUSPENSION (ML) NASAL DAILY
COMMUNITY

## 2020-03-16 ASSESSMENT — FIBROSIS 4 INDEX: FIB4 SCORE: 1.5

## 2020-03-16 NOTE — ASSESSMENT & PLAN NOTE
This is a chronic condition. She states the chemo she went through caused her osteoporosis. She is currently on prolia injections. Her endocrinologist in [facility] (Dr. Evans) wants to wean her off prolia and put her on fosamax. She doesn't want to fly down to [facility] currently with coronavirus. She would like me to order the Prolia today as it is due 3/23/20.

## 2020-03-16 NOTE — PROGRESS NOTES
"Subjective:     CC: f/u allergies    HPI:   Zena presents today with     Environmental allergies  This is a chronic condition.  She gets rhinorrhea, itchy/watery eyes, sneezing, and congestion.  At her last appointment I recommended she take Flonase daily and add an antihistamine to her regimen.  Meds: flonase 1 puff/nostril daily  She notes that her allergies are much better controlled.    Age-related osteoporosis without current pathological fracture  This is a chronic condition. She states the chemo she went through caused her osteoporosis. She is currently on prolia injections. Her endocrinologist in [facility] (Dr. Evans) wants to wean her off prolia and put her on fosamax. She doesn't want to fly down to [facility] currently with coronavirus. She would like me to order the Prolia today as it is due 3/23/20.    Right ear pain  This is an acute condition. She has been having dull pain in the right ear with fullness for the last month. The pain has not changed since using the flonase. No pain current but \"I can feel where it is\".      Past Medical History:   Diagnosis Date   • Backpain     had back surgery, s1-l5 fusion   • Bowel obstruction (HCC)    • Cancer (HCC)     overian cancer   • Heart murmur    • Hypertension    • Indigestion     sometimes   • Other specified symptom associated with female genital organs     had ca ov the ovary   • Schwannoma of cranial nerve (HCC)        Social History     Tobacco Use   • Smoking status: Never Smoker   • Smokeless tobacco: Never Used   Substance Use Topics   • Alcohol use: Yes     Frequency: 2-4 times a month     Drinks per session: 1 or 2     Binge frequency: Never     Comment: 2 drinks per week on average    • Drug use: No       Current Outpatient Medications Ordered in Epic   Medication Sig Dispense Refill   • fluticasone (FLONASE) 50 MCG/ACT nasal spray Spray 1 Spray in nose every day.     • Ayr Saline Nasal Gel Spray  in nose.     • escitalopram (LEXAPRO) 10 MG Tab TAKE 1 " "TABLET BY MOUTH EVERY MORNING 90 Tab 0   • atenolol (TENORMIN) 25 MG Tab TAKE 1 TABLET BY MOUTH EVERY EVENING 90 Tab 2   • LORazepam (ATIVAN) 1 MG Tab Take 1 mg by mouth.     • ondansetron (ZOFRAN) 8 MG Tab Take 8 mg by mouth.     • calcium citrate (CALCITRATE) 950 MG Tab Take 950 mg by mouth every day.     • polyethyl glycol-propyl glycol (SYSTANE) 0.4-0.3 % Solution Place 1 Drop in both eyes every morning.     • denosumab (PROLIA) 60 MG/ML Solution Inject 60 mg as instructed every 6 months. Next dose due: 03/19/19     • vitamin D (CHOLECALCIFEROL) 1000 UNIT TABS Take 2,000 Units by mouth every day.     • aspirin 81 MG EC tablet Take 81 mg by mouth every day.     • Multiple Vitamins-Minerals (SENIOR MULTIVITAMIN PLUS PO) Take 1 Tab by mouth every morning.     • docosahexanoic acid (OMEGA 3 FA) 1000 MG CAPS Take 1,000 mg by mouth every morning.       No current [facility]-ordered facility-administered medications on file.        Allergies:  Latex; Losartan; and Tetracycline    Health Maintenance: Completed    ROS:  Gen: no fevers/chills  Pulm: no sob  CV: no chest pain      Objective:       Exam:  /74 (BP Location: Left arm, Patient Position: Sitting, BP Cuff Size: Adult)   Pulse 70   Temp 36.5 °C (97.7 °F) (Temporal)   Resp 16   Ht 1.702 m (5' 7\")   Wt 62.3 kg (137 lb 6.4 oz)   SpO2 97%   BMI 21.52 kg/m²  Body mass index is 21.52 kg/m².    Gen: Alert and oriented, No apparent distress.  R Ear: Small amount of dry cerumen with normal canal and TM.  Neck: Neck is supple without lymphadenopathy.  Lungs: Normal effort, CTA bilaterally, no wheezes, rhonchi, or rales  CV: Regular rate and rhythm. No murmurs, rubs, or gallops.  Ext: No clubbing, cyanosis, edema.    Assessment & Plan:     69 y.o. female with the following -     1. Environmental allergies  This is a chronic condition, much improved.  At her last appointment I recommended she start Flonase with antihistamine tablet.  Today she reports that she only " "started the Flonase and wanted to try one medicine at a time.  She states that it has been \"night and day\" with the Flonase.  Her symptoms are much better controlled on Flonase alone and we discussed that she does not have to add an antihistamine tablet unless she gets particularly bad symptoms.  -Continue Flonase daily    2. Age-related osteoporosis without current pathological fracture  This is a chronic condition, stable.  She has osteoporosis secondary to chemotherapy.  She is on Prolia injections currently.  She was going to see her endocrinologist this month to discuss transitioning to a different medication but due to the coronavirus in her history she wants to avoid any travel which I think is very reasonable.  Therefore, I will order her Prolia injection and get her 6 more months at which point she will then discuss further with her endocrinologist.    3. Right ear pain  This is an acute condition, unchanged.  She reports that she continues to get a dull pain in her right ear.  No pain currently but \"I can feel where it is\".  Her ear appears normal on exam except with some dry cerumen in the canal.  At this point I am not certain was causing the ear pain.  It is possible secondary to allergies and with more time with the Flonase it will get better.    4. Need for hepatitis C screening test  She qualifies for hepatitis C screening per CDC guidelines.  - HCV Scrn ( [id number] [employer]); Future    Return in about 4 months (around 2020) for Medicare Annual/wellness visit.    Please note that this dictation was created using voice recognition software. I have made every reasonable attempt to correct obvious errors, but I expect that there are errors of grammar and possibly content that I did not discover before finalizing the note.      "

## 2020-03-16 NOTE — ASSESSMENT & PLAN NOTE
"This is an acute condition. She has been having dull pain in the right ear with fullness for the last month. The pain has not changed since using the flonase. No pain current but \"I can feel where it is\".  "

## 2020-03-16 NOTE — ASSESSMENT & PLAN NOTE
This is a chronic condition.  She gets rhinorrhea, itchy/watery eyes, sneezing, and congestion.  At her last appointment I recommended she take Flonase daily and add an antihistamine to her regimen.  Meds: flonase 1 puff/nostril daily  She notes that her allergies are much better controlled.

## 2020-03-18 ENCOUNTER — HOSPITAL ENCOUNTER (OUTPATIENT)
Dept: LAB | Facility: MEDICAL CENTER | Age: 70
End: 2020-03-18
Attending: FAMILY MEDICINE
Payer: MEDICARE

## 2020-03-18 DIAGNOSIS — Z11.59 NEED FOR HEPATITIS C SCREENING TEST: ICD-10-CM

## 2020-03-18 LAB — HCV AB SER QL: NORMAL

## 2020-03-18 PROCEDURE — G0472 HEP C SCREEN HIGH RISK/OTHER: HCPCS

## 2020-03-18 PROCEDURE — 36415 COLL VENOUS BLD VENIPUNCTURE: CPT

## 2020-03-23 ENCOUNTER — TELEPHONE (OUTPATIENT)
Dept: ONCOLOGY | Facility: MEDICAL CENTER | Age: 70
End: 2020-03-23

## 2020-03-24 ENCOUNTER — OUTPATIENT INFUSION SERVICES (OUTPATIENT)
Dept: ONCOLOGY | Facility: MEDICAL CENTER | Age: 70
End: 2020-03-24
Attending: FAMILY MEDICINE
Payer: MEDICARE

## 2020-03-24 VITALS
DIASTOLIC BLOOD PRESSURE: 71 MMHG | SYSTOLIC BLOOD PRESSURE: 121 MMHG | RESPIRATION RATE: 18 BRPM | WEIGHT: 139.77 LBS | HEART RATE: 60 BPM | HEIGHT: 67 IN | OXYGEN SATURATION: 99 % | BODY MASS INDEX: 21.94 KG/M2 | TEMPERATURE: 97.5 F

## 2020-03-24 DIAGNOSIS — M81.0 AGE-RELATED OSTEOPOROSIS WITHOUT CURRENT PATHOLOGICAL FRACTURE: ICD-10-CM

## 2020-03-24 LAB
CA-I BLD ISE-SCNC: 1.04 MMOL/L (ref 1.1–1.3)
CREAT BLD-MCNC: 1.2 MG/DL (ref 0.5–1.4)

## 2020-03-24 PROCEDURE — 82330 ASSAY OF CALCIUM: CPT

## 2020-03-24 PROCEDURE — 82565 ASSAY OF CREATININE: CPT

## 2020-03-24 PROCEDURE — 96372 THER/PROPH/DIAG INJ SC/IM: CPT | Performed by: CLINICAL NURSE SPECIALIST

## 2020-03-24 PROCEDURE — 700111 HCHG RX REV CODE 636 W/ 250 OVERRIDE (IP): Mod: JG | Performed by: FAMILY MEDICINE

## 2020-03-24 PROCEDURE — 36415 COLL VENOUS BLD VENIPUNCTURE: CPT

## 2020-03-24 RX ADMIN — DENOSUMAB 60 MG: 60 INJECTION SUBCUTANEOUS at 15:21

## 2020-03-24 ASSESSMENT — FIBROSIS 4 INDEX: FIB4 SCORE: 1.5

## 2020-03-24 NOTE — PROGRESS NOTES
Patient to infusion for Prolia.  Taking calcium and D. Does not know dosage.  No recent or upcoming dental work.  Istat labs drawn from right hand using butterfly needle.  Ca 1.04.  Patient educated to ensure she is taking adequate calcium.  Prolia injected into left back arm without issue.  S/e discussed.  Next appointment scheduled.  Patient discharged ambulatory to home in stable condition.

## 2020-03-24 NOTE — PROGRESS NOTES
Pharmacy note  Cr = 1.2, CrCl ~ 46 ml/min  Ionized Ca = 1.04  OK for denosumab (Prolia) 60 mg SQ today  Last Dose = 9/23/2019    Kenny Dela Cruz, PharmD

## 2020-08-20 SDOH — ECONOMIC STABILITY: HOUSING INSECURITY
IN THE LAST 12 MONTHS, WAS THERE A TIME WHEN YOU DID NOT HAVE A STEADY PLACE TO SLEEP OR SLEPT IN A SHELTER (INCLUDING NOW)?: NO

## 2020-08-20 SDOH — HEALTH STABILITY: PHYSICAL HEALTH: ON AVERAGE, HOW MANY MINUTES DO YOU ENGAGE IN EXERCISE AT THIS LEVEL?: 50 MINUTES

## 2020-08-20 SDOH — ECONOMIC STABILITY: INCOME INSECURITY: IN THE LAST 12 MONTHS, WAS THERE A TIME WHEN YOU WERE NOT ABLE TO PAY THE MORTGAGE OR RENT ON TIME?: NO

## 2020-08-20 SDOH — ECONOMIC STABILITY: TRANSPORTATION INSECURITY
IN THE PAST 12 MONTHS, HAS LACK OF RELIABLE TRANSPORTATION KEPT YOU FROM MEDICAL APPOINTMENTS, MEETINGS, WORK OR FROM GETTING THINGS NEEDED FOR DAILY LIVING?: NO

## 2020-08-20 SDOH — ECONOMIC STABILITY: HOUSING INSECURITY

## 2020-08-20 SDOH — HEALTH STABILITY: MENTAL HEALTH
STRESS IS WHEN SOMEONE FEELS TENSE, NERVOUS, ANXIOUS, OR CAN'T SLEEP AT NIGHT BECAUSE THEIR MIND IS TROUBLED. HOW STRESSED ARE YOU?: ONLY A LITTLE

## 2020-08-20 SDOH — HEALTH STABILITY: PHYSICAL HEALTH: ON AVERAGE, HOW MANY DAYS PER WEEK DO YOU ENGAGE IN MODERATE TO STRENUOUS EXERCISE (LIKE A BRISK WALK)?: 7 DAYS

## 2020-08-20 SDOH — ECONOMIC STABILITY: TRANSPORTATION INSECURITY
IN THE PAST 12 MONTHS, HAS THE LACK OF TRANSPORTATION KEPT YOU FROM MEDICAL APPOINTMENTS OR FROM GETTING MEDICATIONS?: NO

## 2020-08-20 ASSESSMENT — SOCIAL DETERMINANTS OF HEALTH (SDOH)
HOW OFTEN DO YOU HAVE SIX OR MORE DRINKS ON ONE OCCASION: NEVER
HOW MANY DRINKS CONTAINING ALCOHOL DO YOU HAVE ON A TYPICAL DAY WHEN YOU ARE DRINKING: 1 OR 2
WITHIN THE PAST 12 MONTHS, YOU WORRIED THAT YOUR FOOD WOULD RUN OUT BEFORE YOU GOT THE MONEY TO BUY MORE: NEVER TRUE
HOW OFTEN DO YOU ATTENT MEETINGS OF THE CLUB OR ORGANIZATION YOU BELONG TO?: DECLINE
IN A TYPICAL WEEK, HOW MANY TIMES DO YOU TALK ON THE PHONE WITH FAMILY, FRIENDS, OR NEIGHBORS?: MORE THAN THREE TIMES A WEEK
HOW HARD IS IT FOR YOU TO PAY FOR THE VERY BASICS LIKE FOOD, HOUSING, MEDICAL CARE, AND HEATING?: NOT HARD AT ALL
HOW OFTEN DO YOU GET TOGETHER WITH FRIENDS OR RELATIVES?: DECLINE
HOW OFTEN DO YOU ATTEND CHURCH OR RELIGIOUS SERVICES?: NEVER
DO YOU BELONG TO ANY CLUBS OR ORGANIZATIONS SUCH AS CHURCH GROUPS UNIONS, FRATERNAL OR ATHLETIC GROUPS, OR SCHOOL GROUPS?: YES
WITHIN THE PAST 12 MONTHS, THE FOOD YOU BOUGHT JUST DIDN'T LAST AND YOU DIDN'T HAVE MONEY TO GET MORE: NEVER TRUE
HOW OFTEN DO YOU HAVE A DRINK CONTAINING ALCOHOL: 2-3 TIMES A WEEK

## 2020-08-27 ENCOUNTER — OFFICE VISIT (OUTPATIENT)
Dept: MEDICAL GROUP | Facility: PHYSICIAN GROUP | Age: 70
End: 2020-08-27
Payer: MEDICARE

## 2020-08-27 VITALS
SYSTOLIC BLOOD PRESSURE: 112 MMHG | TEMPERATURE: 97.7 F | RESPIRATION RATE: 16 BRPM | HEART RATE: 68 BPM | OXYGEN SATURATION: 96 % | WEIGHT: 138 LBS | HEIGHT: 67 IN | DIASTOLIC BLOOD PRESSURE: 68 MMHG | BODY MASS INDEX: 21.66 KG/M2

## 2020-08-27 DIAGNOSIS — M81.0 AGE-RELATED OSTEOPOROSIS WITHOUT CURRENT PATHOLOGICAL FRACTURE: ICD-10-CM

## 2020-08-27 DIAGNOSIS — N18.30 CHRONIC KIDNEY DISEASE, STAGE 3, MOD DECREASED GFR: ICD-10-CM

## 2020-08-27 DIAGNOSIS — I10 ESSENTIAL HYPERTENSION: ICD-10-CM

## 2020-08-27 DIAGNOSIS — Z91.09 ENVIRONMENTAL ALLERGIES: ICD-10-CM

## 2020-08-27 DIAGNOSIS — Z00.00 ENCOUNTER FOR MEDICARE ANNUAL WELLNESS EXAM: ICD-10-CM

## 2020-08-27 DIAGNOSIS — Z12.31 ENCOUNTER FOR SCREENING MAMMOGRAM FOR MALIGNANT NEOPLASM OF BREAST: ICD-10-CM

## 2020-08-27 DIAGNOSIS — Z85.43 HISTORY OF OVARIAN CANCER: ICD-10-CM

## 2020-08-27 DIAGNOSIS — G62.9 NEUROPATHY: ICD-10-CM

## 2020-08-27 DIAGNOSIS — D13.2 DUODENAL ADENOMA: ICD-10-CM

## 2020-08-27 PROBLEM — H92.01 RIGHT EAR PAIN: Status: RESOLVED | Noted: 2020-03-16 | Resolved: 2020-08-27

## 2020-08-27 PROCEDURE — G0439 PPPS, SUBSEQ VISIT: HCPCS | Performed by: FAMILY MEDICINE

## 2020-08-27 ASSESSMENT — ENCOUNTER SYMPTOMS: GENERAL WELL-BEING: GOOD

## 2020-08-27 ASSESSMENT — PATIENT HEALTH QUESTIONNAIRE - PHQ9: CLINICAL INTERPRETATION OF PHQ2 SCORE: 0

## 2020-08-27 ASSESSMENT — ACTIVITIES OF DAILY LIVING (ADL): BATHING_REQUIRES_ASSISTANCE: 0

## 2020-08-27 ASSESSMENT — FIBROSIS 4 INDEX: FIB4 SCORE: 1.52

## 2020-08-27 NOTE — PROGRESS NOTES
Chief Complaint   Patient presents with   • Annual Exam     AWV       HPI:  Zena Paz is a 70 y.o. 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     • Ayr Saline Nasal Gel Spray  in nose.     • atenolol (TENORMIN) 25 MG Tab TAKE 1 TABLET BY MOUTH EVERY EVENING 90 Tab 2   • LORazepam (ATIVAN) 1 MG Tab Take 1 mg by mouth.     • ondansetron (ZOFRAN) 8 MG Tab Take 8 mg by mouth.     • calcium citrate (CALCITRATE) 950 MG Tab Take 950 mg by mouth every day.     • polyethyl glycol-propyl glycol (SYSTANE) 0.4-0.3 % Solution Place 1 Drop in both eyes every morning.     • denosumab (PROLIA) 60 MG/ML Solution Inject 60 mg as instructed every 6 months. Next dose due: 03/19/19     • vitamin D (CHOLECALCIFEROL) 1000 UNIT TABS Take 2,000 Units by mouth every day.     • aspirin 81 MG EC tablet Take 81 mg by mouth every day.     • Multiple Vitamins-Minerals (SENIOR MULTIVITAMIN PLUS PO) Take 1 Tab by mouth every morning.     • docosahexanoic acid (OMEGA 3 FA) 1000 MG CAPS Take 1,000 mg by mouth every morning.       No current facility-administered medications for this visit.             Current supplements  as per medication list.       Allergies: Latex, Losartan, and Tetracycline    Current social contact/activities: Zoom meet ups due to pandemic     She  reports that she has never smoked. She has never used smokeless tobacco. She reports current alcohol use. She reports that she does not use drugs.  Counseling given: Yes      DPA/Advanced Directive:  Patient has Advanced Directive, but it is not on file. Instructed to bring in a copy to scan into their chart.    ROS:    Gait: Uses no assistive device  Ostomy: No  Other tubes: No  Amputations: No  Chronic oxygen use: No  Last eye exam: 2019  Wears hearing aids: No   : Reports urinary leakage during the last 6 months that has not interfered at all with their daily activities or sleep.    Screening:    Depression Screening    Little interest or pleasure in doing things?  0 - not at all  Feeling down, depressed , or hopeless? 0 - not at all  Patient Health Questionnaire Score: 0     If depressive symptoms identified deferred to follow up visit unless specifically addressed in assessment and plan.    Interpretation of PHQ-9 Total Score   Score Severity   1-4 No Depression   5-9 Mild Depression   10-14 Moderate Depression   15-19 Moderately Severe Depression   20-27 Severe Depression    Screening for Cognitive Impairment    Three Minute Recall (river, katelyn, finger) 2/3    Kali clock face with all 12 numbers and set the hands to show 10 past 11.  Yes    Cognitive concerns identified deferred for follow up unless specifically addressed in assessment and plan.    Fall Risk Assessment    Has the patient had two or more falls in the last year or any fall with injury in the last year?  No    Safety Assessment    Throw rugs on floor.  Yes  Handrails on all stairs.  Yes  Good lighting in all hallways.  Yes  Difficulty hearing.  No  Patient counseled about all safety risks that were identified.    Functional Assessment ADLs    Are there any barriers preventing you from cooking  for yourself or meeting nutritional needs?  No.    Are there any barriers preventing you from driving safely or obtaining transportation?  No.    Are there any barriers preventing you from using a telephone or calling for help?  No.    Are there any barriers preventing you from shopping?  No.    Are there any barriers preventing you from taking care of your own finances?  No.    Are there any barriers preventing you from managing your medications?  No.    Are there any barriers preventing you from showering, bathing or dressing yourself?  No.    Are you currently engaging in any exercise or physical activity?  Yes.     What is your perception of your health?  Good.      Health Maintenance Summary                Annual Wellness Visit Overdue 1950     MAMMOGRAM Next Due 9/26/2020      Done 9/26/2019 MA-SCREENING MAMMO BILAT W/TOMOSYNTHESIS W/CAD     Patient has more history with this topic...    IMM INFLUENZA Next Due 9/1/2020      Done 10/18/2019 Imm Admin: Influenza Vaccine Adult HD     Patient has more history with this topic...    IMM DTaP/Tdap/Td Vaccine Next Due 10/5/2020      Done 10/5/2010 Imm Admin: Tdap Vaccine     Patient has more history with this topic...    BONE DENSITY Next Due 1/6/2025      Done 1/6/2020 Ext Proc: DS-BONE DENSITY STUDY (DEXA)    COLONOSCOPY Next Due 6/28/2026      Done 6/28/2016 REFERRAL TO GI FOR COLONOSCOPY          Patient Care Team:  Martina Grajeda M.D. as PCP - General (Family Medicine)  Yadira Jaffe, PT, MPT, OCS as Physical Therapy (Physical Therapy)        Social History     Tobacco Use   • Smoking status: Never Smoker   • Smokeless tobacco: Never Used   Substance Use Topics   • Alcohol use: Yes     Frequency: 2-3 times a week     Drinks per session: 1 or 2     Binge frequency: Never     Comment: 2 drinks per week on average    • Drug use: No     Family History   Problem Relation Age of Onset   • Heart Disease Mother    • Stroke Father      She  has a past medical  "history of Backpain, Bowel obstruction (HCC), Cancer (HCC), Heart murmur, Hypertension, Indigestion, Other specified symptom associated with female genital organs, and Schwannoma of cranial nerve (HCC).   Past Surgical History:   Procedure Laterality Date   • HYSTERECTOMY RADICAL  2012   • OOPHORECTOMY Bilateral 2012   • LUMBAR FUSION POSTERIOR  2001   • CRANIOTOMY  1993    Schwannoma L 5th CN       Exam:   /68   Pulse 68   Temp 36.5 °C (97.7 °F)   Resp 16   Ht 1.7 m (5' 6.93\")   Wt 62.6 kg (138 lb)   SpO2 96%  Body mass index is 21.66 kg/m².    Hearing good.    Dentition bridge  Alert, oriented in no acute distress.  Eye contact is good, speech goal directed, affect calm    Assessment and Plan. The following treatment and monitoring plan is recommended:      1. Encounter for Medicare annual wellness exam  2. Encounter for screening mammogram for malignant neoplasm of breast  Padmini is a pleasant 70-year-old woman here today for Medicare wellness visit.  She has no acute concerns.  She will be due for her yearly breast screening in September, 2020.  Due to extreme dense breasts we will be getting a mammogram and screening ultrasound.  - MA-SCREENING MAMMO BILAT W/CAD; Future  - US-SCREENING WHOLE BREAST BILATERAL (3D SCREENING); Future    3. Chronic kidney disease, stage 3, mod decreased GFR (HCC)  This is a chronic condition, stable.  She has chronic kidney disease that was caused by chemotherapy.  She does follow with nephrology at [facility] regularly who is managing the condition.  -Continue to follow with nephrology    4. Age-related osteoporosis without current pathological fracture  This is a chronic condition, stable.  She has a history of osteoporosis and follows with endocrinology at [facility].  Currently she is on Prolia injections but endocrinology plans on weaning her off of Prolia and switching her to a different injectable medication.  She will discuss that further with endocrinology.  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  In 2016 she was noted to have a duodenal adenoma underwent surgical resection.  She did follow GI regularly reports that she recently saw them and had an upper endoscopy that showed no recurrence.  Therefore, she was told that she no longer needs to follow-up with GI and does not require any further testing.    6. Essential hypertension  This is a chronic condition, controlled.  She is on atenolol and tolerating it well with any side effect.  Blood pressure is well controlled in the office today.  -Continue atenolol 25 mg daily    7. Neuropathy  This is a chronic condition, unchanged.  She does get burning in both of her feet and is secondary to chemotherapy.  She notices it most at night but it is really bothersome more than anything else.  -Continue to monitor    8. Environmental allergies  This is a chronic condition, well controlled.  She states ever since she started using Flonase regularly that her allergy symptoms have been very well controlled.  -Continue Flonase    9. History of ovarian cancer  This is a chronic condition, in remission.  She was diagnosed with ovarian cancer in 2012 and underwent hysterectomy with bilateral oophorectomy and peritoneal chemotherapy.  She follows with oncology every 3 months and gets a CA-125 checked regularly.  She states that her next check will be due in the end of October and she would like to get that done here in town instead of going to [facility] to get the labs done.  They were, I have ordered the CA-125 and since her providers are in care everywhere they should be able to see the results.  - ; Future    Services suggested: No services needed at this time  Health Care Screening: Age-appropriate preventive services recommended by USPTF and ACIP covered by Medicare were discussed today. Services ordered if indicated and agreed upon by the patient.  Referrals offered:  Community-based lifestyle interventions to reduce health risks and promote self-management and wellness, fall prevention, nutrition, physical activity, tobacco-use cessation, weight loss, and mental health services as per orders if indicated.    Discussion today about general wellness and lifestyle habits:    · Prevent falls and reduce trip hazards; Cautioned about securing or removing rugs.  · Have a working fire alarm and carbon monoxide detector;   · Engage in regular physical activity and social activities     Follow-up: Return in about 4 months (around 12/27/2020) for Med check.

## 2020-09-16 ENCOUNTER — TELEPHONE (OUTPATIENT)
Dept: MEDICAL GROUP | Facility: PHYSICIAN GROUP | Age: 70
End: 2020-09-16

## 2020-09-16 NOTE — TELEPHONE ENCOUNTER
[facility] for the [facility] called and stated they are needing an updated ICD code per medicare.    M81.0 needs to be changed to something more specific, or insurance will not cover this for pt.

## 2020-09-17 NOTE — TELEPHONE ENCOUNTER
I don't have a more specific diagnosis. I ordered this for her [facility] endocrinologist. She will have to contact her endocrinologist for a more specific diagnosis.

## 2020-09-24 ENCOUNTER — OUTPATIENT INFUSION SERVICES (OUTPATIENT)
Dept: ONCOLOGY | Facility: MEDICAL CENTER | Age: 70
End: 2020-09-24
Attending: FAMILY MEDICINE
Payer: MEDICARE

## 2020-09-24 VITALS
SYSTOLIC BLOOD PRESSURE: 122 MMHG | TEMPERATURE: 97.8 F | RESPIRATION RATE: 17 BRPM | OXYGEN SATURATION: 100 % | BODY MASS INDEX: 22.18 KG/M2 | DIASTOLIC BLOOD PRESSURE: 89 MMHG | WEIGHT: 141.31 LBS | HEART RATE: 65 BPM | HEIGHT: 67 IN

## 2020-09-24 DIAGNOSIS — M81.0 AGE-RELATED OSTEOPOROSIS WITHOUT CURRENT PATHOLOGICAL FRACTURE: ICD-10-CM

## 2020-09-24 LAB
CA-I BLD ISE-SCNC: 1.24 MMOL/L (ref 1.1–1.3)
CREAT BLD-MCNC: 1.3 MG/DL (ref 0.5–1.4)

## 2020-09-24 PROCEDURE — 700111 HCHG RX REV CODE 636 W/ 250 OVERRIDE (IP): Mod: JG | Performed by: FAMILY MEDICINE

## 2020-09-24 PROCEDURE — 36415 COLL VENOUS BLD VENIPUNCTURE: CPT

## 2020-09-24 PROCEDURE — 82330 ASSAY OF CALCIUM: CPT

## 2020-09-24 PROCEDURE — 82565 ASSAY OF CREATININE: CPT

## 2020-09-24 PROCEDURE — 96372 THER/PROPH/DIAG INJ SC/IM: CPT

## 2020-09-24 RX ADMIN — DENOSUMAB 60 MG: 60 INJECTION SUBCUTANEOUS at 13:58

## 2020-09-24 ASSESSMENT — FIBROSIS 4 INDEX: FIB4 SCORE: 1.52

## 2020-09-24 NOTE — LETTER
[facility]   [address] [place] [id number]  Phone: [phone]  Fax: [phone]              Dear Dr. Cook,    Your patient, Zena Paz (: 1950), was scheduled at [facility].  Zena's encounter diagnosis is:  1. Age-related osteoporosis without current pathological fracture  ISTAT CREATININE    ISTAT IONIZED CA    ISTAT CREATININE    ISTAT IONIZED CA     She arrived for her appointment, and  the scheduled treatment was   given. These medications were administered to the patient: We administered denosumab..  Zena Paz  tolerated treatment. In addition, the following labs were drawn    Recent Results (from the past 24 hour(s))   ISTAT CREATININE    Collection Time: 20  1:45 PM   Result Value Ref Range    Istat Creatinine 1.3 0.5 - 1.4 mg/dL   ISTAT IONIZED CA    Collection Time: 20  1:45 PM   Result Value Ref Range    Istat Ionized Calcium 1.24 1.10 - 1.30 mmol/L            Her next appointment is not scheduled at she will need to follow up with you.    For more information, you may review the nurse's progress notes in chart review under the notes section.       Sincerely,  Emily Vilchis R.N.

## 2020-09-25 NOTE — PROGRESS NOTES
Pt arrived to [facility] ambulatory, here for q6 month Prolia. iStat Ca/Creat drawn with 23 g butterfly from R AC. Pressure dressing applied. Pt reports she is familiar with Prolia and denies any recent or planned dental procedures and no active infections. Pharmacy reviewed pt's labs and pt appropriate for injection. Injection given to back of R arm, band aide applied. Pt knows there are no future appts in place and order was for one time. Pt will be due to next injection in March 2021. Pt aware she will need to follow up with her MD; discharged home under self care in no apparent distress.

## 2020-09-28 ENCOUNTER — HOSPITAL ENCOUNTER (OUTPATIENT)
Dept: RADIOLOGY | Facility: MEDICAL CENTER | Age: 70
End: 2020-09-28
Attending: FAMILY MEDICINE
Payer: MEDICARE

## 2020-09-28 DIAGNOSIS — Z12.31 ENCOUNTER FOR SCREENING MAMMOGRAM FOR MALIGNANT NEOPLASM OF BREAST: ICD-10-CM

## 2020-09-28 PROCEDURE — 76641 ULTRASOUND BREAST COMPLETE: CPT

## 2020-09-28 PROCEDURE — 77067 SCR MAMMO BI INCL CAD: CPT

## 2020-10-07 ENCOUNTER — NON-PROVIDER VISIT (OUTPATIENT)
Dept: MEDICAL GROUP | Facility: PHYSICIAN GROUP | Age: 70
End: 2020-10-07
Payer: MEDICARE

## 2020-10-07 DIAGNOSIS — Z23 NEED FOR IMMUNIZATION AGAINST INFLUENZA: ICD-10-CM

## 2020-10-07 PROCEDURE — G0008 ADMIN INFLUENZA VIRUS VAC: HCPCS | Performed by: FAMILY MEDICINE

## 2020-10-07 PROCEDURE — 90662 IIV NO PRSV INCREASED AG IM: CPT | Performed by: FAMILY MEDICINE

## 2020-10-07 NOTE — PROGRESS NOTES
"Padmini Paz is a 70 y.o. female here for a non-provider visit for:   FLU    Reason for immunization: Annual Flu Vaccine  Immunization records indicate need for vaccine: Yes, confirmed with Epic  Minimum interval has been met for this vaccine: Yes  ABN completed: Yes    Order and dose verified by: Neelam Worrell  VIS Dated  8/15/2019 was given to patient: Yes  All IAC Questionnaire questions were answered \"No.\"    Patient tolerated injection and no adverse effects were observed or reported: Yes    Pt scheduled for next dose in series: Not Indicated    "

## 2020-11-06 ENCOUNTER — HOSPITAL ENCOUNTER (OUTPATIENT)
Dept: LAB | Facility: MEDICAL CENTER | Age: 70
End: 2020-11-06
Attending: FAMILY MEDICINE
Payer: MEDICARE

## 2020-11-06 DIAGNOSIS — Z85.43 HISTORY OF OVARIAN CANCER: ICD-10-CM

## 2020-11-06 LAB — CANCER AG125 SERPL-ACNC: 16.4 U/ML (ref 0–35)

## 2020-11-06 PROCEDURE — 86304 IMMUNOASSAY TUMOR CA 125: CPT

## 2020-11-06 PROCEDURE — 36415 COLL VENOUS BLD VENIPUNCTURE: CPT

## 2020-11-10 ENCOUNTER — OFFICE VISIT (OUTPATIENT)
Dept: MEDICAL GROUP | Facility: PHYSICIAN GROUP | Age: 70
End: 2020-11-10
Payer: MEDICARE

## 2020-11-10 VITALS
RESPIRATION RATE: 14 BRPM | DIASTOLIC BLOOD PRESSURE: 68 MMHG | HEART RATE: 60 BPM | WEIGHT: 138 LBS | TEMPERATURE: 97.2 F | BODY MASS INDEX: 21.66 KG/M2 | SYSTOLIC BLOOD PRESSURE: 108 MMHG | HEIGHT: 67 IN | OXYGEN SATURATION: 97 %

## 2020-11-10 DIAGNOSIS — Z23 NEED FOR VACCINATION: ICD-10-CM

## 2020-11-10 DIAGNOSIS — M25.561 CHRONIC PAIN OF RIGHT KNEE: ICD-10-CM

## 2020-11-10 DIAGNOSIS — G89.29 CHRONIC PAIN OF RIGHT KNEE: ICD-10-CM

## 2020-11-10 DIAGNOSIS — R19.8 GI SYMPTOMS: Primary | ICD-10-CM

## 2020-11-10 PROCEDURE — 99214 OFFICE O/P EST MOD 30 MIN: CPT | Performed by: FAMILY MEDICINE

## 2020-11-10 ASSESSMENT — FIBROSIS 4 INDEX: FIB4 SCORE: 1.52

## 2020-11-10 NOTE — ASSESSMENT & PLAN NOTE
This is a new condition. Ever since her ovarian cancer she has had bowel changes. The ovarian cancer was stage III had had spread to bowel and diaphragm. Some days she will get up to 10 BMs in a day. Sometimes the BM is normal but then immediately afterwards she will have diarrhea. She will get colon cramps that can be painful. She did have some rectal bleeding last week but thinks it was due to hemorrhoids. She doesn't get symptoms of constipation. She does take metamucil once daily which sometimes help. She reports GI did a work up for this about 7 years ago and no cause elicited. She tried Creon and cholestyramine for a while but no difference in symptoms.

## 2020-11-10 NOTE — ASSESSMENT & PLAN NOTE
This is a chronic condition. She has had knee pain for a while. A meniscus tear was noted in the R knee a couple years ago. She has completed PT which helped. However, certain positions will still hurt her knee.

## 2020-11-10 NOTE — PROGRESS NOTES
Subjective:     CC: GI symptoms, knee pain    HPI:   Zena presents today with     GI symptoms  This is a new condition. Ever since her ovarian cancer she has had bowel changes. The ovarian cancer was stage III had had spread to bowel and diaphragm. Some days she will get up to 10 BMs in a day. Sometimes the BM is normal but then immediately afterwards she will have diarrhea. She will get colon cramps that can be painful. She did have some rectal bleeding last week but thinks it was due to hemorrhoids. She doesn't get symptoms of constipation. She does take metamucil once daily which sometimes help. She reports GI did a work up for this about 7 years ago and no cause elicited. She tried Creon and cholestyramine for a while but no difference in symptoms.    Chronic pain of right knee  This is a chronic condition. She has had knee pain for a while. A meniscus tear was noted in the R knee a couple years ago. She has completed PT which helped. However, certain positions will still hurt her knee.       Past Medical History:   Diagnosis Date   • Backpain     had back surgery, s1-l5 fusion   • Bowel obstruction (HCC)    • Cancer (HCC)     overian cancer   • Heart murmur    • Hypertension    • Indigestion     sometimes   • Other specified symptom associated with female genital organs     had ca ov the ovary   • Schwannoma of cranial nerve (HCC)        Social History     Tobacco Use   • Smoking status: Never Smoker   • Smokeless tobacco: Never Used   Substance Use Topics   • Alcohol use: Yes     Frequency: 2-3 times a week     Drinks per session: 1 or 2     Binge frequency: Never     Comment: 2 drinks per week on average    • Drug use: No       Current Outpatient Medications Ordered in Epic   Medication Sig Dispense Refill   • tetanus-dipth-acell pertussis (ADACEL) 5-2-15.5 LF-MCG/0.5 Suspension 0.5 mL by Intramuscular route Once PRN for up to 1 dose. 0.5 mL 0   • atenolol (TENORMIN) 25 MG Tab TAKE 1 TABLET BY MOUTH EVERY  "EVENING 90 Tab 3   • escitalopram (LEXAPRO) 10 MG Tab TAKE 1 TABLET BY MOUTH EVERY MORNING 90 Tab 3   • fluticasone (FLONASE) 50 MCG/ACT nasal spray Spray 1 Spray in nose every day.     • Ayr Saline Nasal Gel Spray  in nose.     • LORazepam (ATIVAN) 1 MG Tab Take 1 mg by mouth.     • ondansetron (ZOFRAN) 8 MG Tab Take 8 mg by mouth.     • calcium citrate (CALCITRATE) 950 MG Tab Take 950 mg by mouth every day.     • polyethyl glycol-propyl glycol (SYSTANE) 0.4-0.3 % Solution Place 1 Drop in both eyes every morning.     • denosumab (PROLIA) 60 MG/ML Solution Inject 60 mg as instructed every 6 months. Next dose due: 03/19/19     • vitamin D (CHOLECALCIFEROL) 1000 UNIT TABS Take 2,000 Units by mouth every day.     • aspirin 81 MG EC tablet Take 81 mg by mouth every day.     • Multiple Vitamins-Minerals (SENIOR MULTIVITAMIN PLUS PO) Take 1 Tab by mouth every morning.     • docosahexanoic acid (OMEGA 3 FA) 1000 MG CAPS Take 1,000 mg by mouth every morning.       No current [facility]-ordered facility-administered medications on file.        Allergies:  Latex, Losartan, and Tetracycline    Health Maintenance: Completed    ROS:  Gen: no fevers/chills  Pulm: no sob  CV: no chest pain    Objective:     Exam:  /68 (BP Location: Left arm, Patient Position: Sitting, BP Cuff Size: Adult)   Pulse 60   Temp 36.2 °C (97.2 °F) (Temporal)   Resp 14   Ht 1.71 m (5' 7.32\")   Wt 62.6 kg (138 lb)   SpO2 97%   BMI 21.41 kg/m²  Body mass index is 21.41 kg/m².    Gen: Alert and oriented, No apparent distress.  Neck: Neck is supple without lymphadenopathy.  Lungs: Normal effort, CTA bilaterally, no wheezes, rhonchi, or rales  CV: Regular rate and rhythm. No murmurs, rubs, or gallops.  GI:  +BS, ND/NT, no masses, no hepatosplenomegaly  Ext: No clubbing, cyanosis, edema.    Assessment & Plan:     70 y.o. female with the following -     1. GI symptoms  This is a chronic condition, new to me.  Since 2012 since ending chemotherapy for " "treating her ovarian cancer, she has had a change in her bowel habits.  She states that she will have frequent bowel movements, on some days up to 10 in a day.  She can go between diarrhea and a normally formed stool within the same day.  She does note that she will get what she calls: Cramps but the cramping is actually felt near the tailbone so I suspect were dealing with rectal or sigmoid colon cramping.  The cramping does improve with defecation.  She did see a GI doctor at [facility] and she reports an extensive work-up in 2016 that included small bowel follow-through, endoscopy with biopsies, 72-hour stool collection.  The only thing came back positive was a little bit elevated fat content in the stool.  However, she states that she tried Creon and cholestyramine for \"a long time\" but there is no change in her symptoms.  She states the GI doctor at that time was not even certain that mildly elevated fat content was the cause for her symptoms.  She does state that her ovarian cancer was treated with chemotherapy that was directly placed into the peritoneum through report.  She also had spread of ovarian cancer to her bowel and her diaphragm which could have been also contributing to the onset of symptoms.  However, except for no abdominal pain or cramping, she does meet criteria for IBS.  I am inclined to say that this is probably irritable bowel since she has undergone an extensive work-up with GI and the rectal cramping she describes I would say is similar to abdominal cramping.  -Try IBgard  -Keep a symptom journal until her next appointment  -May be consider dialing back on the Metamucil to every other day to see if the frequency of bowel movements improve and if the diarrhea improves    2. Chronic pain of right knee  Is a chronic condition, unchanged.  For last couple years she is had pain in her right knee.  She states that an MRI with [facility] did show a meniscus tear in the right knee.  She underwent physical " therapy which helped but she has not gotten complete resolution of the pain in the right knee and certain positions will still aggravate it.  She would like to see orthopedics to see if another MRI is required or what other treatment options she has available.  - REFERRAL TO ORTHOPEDICS    3. Need for vaccination  - tetanus-dipth-acell pertussis (ADACEL) 5-2-15.5 LF-MCG/0.5 Suspension; 0.5 mL by Intramuscular route Once PRN for up to 1 dose.  Dispense: 0.5 mL; Refill: 0      Return if symptoms worsen or fail to improve.    Please note that this dictation was created using voice recognition software. I have made every reasonable attempt to correct obvious errors, but I expect that there are errors of grammar and possibly content that I did not discover before finalizing the note.

## 2020-12-07 ENCOUNTER — OFFICE VISIT (OUTPATIENT)
Dept: DERMATOLOGY | Facility: IMAGING CENTER | Age: 70
End: 2020-12-07
Payer: MEDICARE

## 2020-12-07 DIAGNOSIS — D18.01 CHERRY ANGIOMA: ICD-10-CM

## 2020-12-07 DIAGNOSIS — Z12.83 SKIN CANCER SCREENING: ICD-10-CM

## 2020-12-07 DIAGNOSIS — L81.4 LENTIGINES: ICD-10-CM

## 2020-12-07 DIAGNOSIS — D48.5 NEOPLASM OF UNCERTAIN BEHAVIOR OF SKIN: ICD-10-CM

## 2020-12-07 DIAGNOSIS — D22.9 MULTIPLE NEVI: ICD-10-CM

## 2020-12-07 DIAGNOSIS — L72.0 MILIA: ICD-10-CM

## 2020-12-07 DIAGNOSIS — L82.1 SEBORRHEIC KERATOSES: ICD-10-CM

## 2020-12-07 PROCEDURE — 99214 OFFICE O/P EST MOD 30 MIN: CPT | Performed by: NURSE PRACTITIONER

## 2020-12-07 ASSESSMENT — ENCOUNTER SYMPTOMS
FEVER: 0
CHILLS: 0

## 2020-12-07 NOTE — PROGRESS NOTES
Dermatology Return Patient Visit    Chief Complaint   Patient presents with   • Annual Exam     ROBERT       Subjective:     HPI:   Zena Paz is a 70 y.o. female presenting for    Pt of Dr Cho here for yearly skin exam.  Has 2 spots of concern.    HPI/location: growth under right eye  Time present: within this year  Painful lesion: No  Itching lesion: No  Enlarging lesion: Yes  Anything make it better or worse?n/a    HPI/location: spot on back  Time present: months maybe  Painful lesion: No  Itching lesion: Yes  Enlarging lesion: No  Anything make it better or worse? Gets got on clothing, has bleed before.    History of skin cancer: Yes, Details: BCC, can't remember where (5+ years ago at least)  History of precancers/actinic keratoses: Yes, Details: yes  History of biopsies:Yes, Details: moles, atypical, DF on arm  History of blistering/severe sunburns:Yes, Details: as a child  Family history of skin cancer:Yes, Details: Father and 3 siblings with melanoma's      Past Medical History:   Diagnosis Date   • Backpain     had back surgery, s1-l5 fusion   • Bowel obstruction (HCC)    • Cancer (HCC)     overian cancer   • Heart murmur    • Hypertension    • Indigestion     sometimes   • Other specified symptom associated with female genital organs     had ca ov the ovary   • Schwannoma of cranial nerve (HCC)        Current Outpatient Medications on File Prior to Visit   Medication Sig Dispense Refill   • tetanus-dipth-acell pertussis (ADACEL) 5-2-15.5 LF-MCG/0.5 Suspension 0.5 mL by Intramuscular route Once PRN for up to 1 dose. 0.5 mL 0   • atenolol (TENORMIN) 25 MG Tab TAKE 1 TABLET BY MOUTH EVERY EVENING 90 Tab 3   • escitalopram (LEXAPRO) 10 MG Tab TAKE 1 TABLET BY MOUTH EVERY MORNING 90 Tab 3   • fluticasone (FLONASE) 50 MCG/ACT nasal spray Spray 1 Spray in nose every day.     • Ayr Saline Nasal Gel Spray  in nose.     • LORazepam (ATIVAN) 1 MG Tab Take 1 mg by mouth.     • ondansetron (ZOFRAN) 8 MG Tab  Take 8 mg by mouth.     • calcium citrate (CALCITRATE) 950 MG Tab Take 950 mg by mouth every day.     • polyethyl glycol-propyl glycol (SYSTANE) 0.4-0.3 % Solution Place 1 Drop in both eyes every morning.     • denosumab (PROLIA) 60 MG/ML Solution Inject 60 mg as instructed every 6 months. Next dose due: 03/19/19     • vitamin D (CHOLECALCIFEROL) 1000 UNIT TABS Take 2,000 Units by mouth every day.     • aspirin 81 MG EC tablet Take 81 mg by mouth every day.     • Multiple Vitamins-Minerals (SENIOR MULTIVITAMIN PLUS PO) Take 1 Tab by mouth every morning.     • docosahexanoic acid (OMEGA 3 FA) 1000 MG CAPS Take 1,000 mg by mouth every morning.       No current facility-administered medications on file prior to visit.        Allergies   Allergen Reactions   • Latex Rash     Rash     • Losartan      palpitations   • Tetracycline      Sensitivity to sun; sun burns.  Reaction date; 15 years ago from 2013.       Family History   Problem Relation Age of Onset   • Heart Disease Mother    • Stroke Father        Social History     Socioeconomic History   • Marital status:      Spouse name: Not on file   • Number of children: Not on file   • Years of education: Not on file   • Highest education level: Master's degree (e.g., MA, MS, Vikram, MEd, MSW, ANA)   Occupational History   • Not on file   Social Needs   • Financial resource strain: Not hard at all   • Food insecurity     Worry: Never true     Inability: Never true   • Transportation needs     Medical: No     Non-medical: No   Tobacco Use   • Smoking status: Never Smoker   • Smokeless tobacco: Never Used   Substance and Sexual Activity   • Alcohol use: Yes     Frequency: 2-3 times a week     Drinks per session: 1 or 2     Binge frequency: Never     Comment: 2 drinks per week on average    • Drug use: No   • Sexual activity: Yes     Partners: Male     Birth control/protection: Post-Menopausal   Lifestyle   • Physical activity     Days per week: 7 days     Minutes per  session: 50 min   • Stress: Only a little   Relationships   • Social connections     Talks on phone: More than three times a week     Gets together: Patient refused     Attends [employer] service: Never     Active member of club or organization: Yes     Attends meetings of clubs or organizations: Patient refused     Relationship status:    • Intimate partner violence     Fear of current or ex partner: Not on file     Emotionally abused: Not on file     Physically abused: Not on file     Forced sexual activity: Not on file   Other Topics Concern   •  Service Not Asked   • Blood Transfusions Not Asked   • Caffeine Concern Not Asked   • Occupational Exposure Not Asked   • Hobby Hazards Not Asked   • Sleep Concern No   • Stress Concern Not Asked   • Weight Concern No   • Special Diet Not Asked   • Back Care Not Asked   • Exercise Yes   • Bike Helmet Not Asked   • Seat Belt Not Asked   • Self-Exams Not Asked   Social History Narrative   • Not on file       Review of Systems   Constitutional: Negative for chills and fever.   Skin: Negative for itching and rash.        Objective:     A full mucocutaneous exam was completed including: scalp, hair, ears, face, eyelids, conjunctiva, lips, gums/tongue/oropharynx, neck, chest breasts, abdomen, back, left and right upper extremities (including hands/digits and fingernails), left and right lower extremities (including feet/toes, toenails), buttocks, excluding external genitalia (patient refusal) with the following pertinent findings listed below. Remaining above-listed examined areas within normal limits / negative for rashes or lesions.    There were no vitals taken for this visit.    Physical Exam   Constitutional: She is oriented to person, place, and time and well-developed, well-nourished, and in no distress. No distress.   HENT:   Head: Normocephalic.       Right Ear: External ear normal.   Left Ear: External ear normal.   Mouth/Throat: Oropharynx is clear and  moist.   Several scattered tan and light brown macules over face.       Eyes: Conjunctivae are normal.   Neck: Neck supple.   Pulmonary/Chest: Effort normal.   Neurological: She is alert and oriented to person, place, and time.   Skin: Skin is warm and dry. No rash noted. No erythema.        Multiple medium brown macules and flesh colored papules scattered over the trunk.    Several scattered 1-3mm bright red macules and thin papules on the trunk.     Several scattered medium brown macules over chest, forearms and shins.      Psychiatric: Mood and affect normal.   Vitals reviewed.      DATA: none applicable to review    Assessment and Plan:     1. Neoplasm of uncertain behavior of skin  Procedure Note   Procedure: Biopsy by shave technique  Location: right lower back  Size: as noted in exam  Preoperative diagnosis: compound nevus,  r/o atypia  Risks, benefits and alternatives of procedure discussed and written informed consent obtained for procedure and verbal consent for photos. Time out completed. Area of biopsy prepped with alcohol. Anesthesia with 1% lidocaine with epinephrine administered with 30 gauge needle. Shave biopsy of the site performed. Hemostasis achieved with pressure and aluminum chloride. Vaseline applied to wound with bandage. Patient tolerated procedure well and there were no complications. The specimen was sent to the pathology lab by the staff. Wound care was discussed.      2. Seborrheic keratoses  - Benign-appearing nature of lesions discussed. Advised to return to clinic for any new or concerning changes.      3. Lentigines  - Discussed benign nature of lesions, related to sun exposure  - Discussed sun protection, hand out provided      4. Multiple nevi  - Benign-appearing nature of lesions discussed. Advised to return to clinic for any new or concerning changes.      5. Cherry angioma  - Benign-appearing nature of lesions discussed. Advised to return to clinic for any new or concerning  changes.      6. Milia  - Benign-appearing nature of lesion discussed. Advised to return to clinic for any new or concerning changes.      7. Skin cancer screening  Skin cancer education  - discussed importance of sun protective clothing, eyewear  - discussed importance of daily use of broad spectrum sunscreen with SPF 30 or greater, as well as need for reapplication ~every 2 hours when exposed to UVR  - discussed importance of regular self-exams, ideally once per month, every 12 months exams in clinic  - ABCDE's of melanoma discussed  - patient to bring any new or concerning lesions to my attention  - Patient educational handout provided and reviewed with patient        Followup: Return in about 1 year (around 12/7/2021) for ROBERT or sooner for any growing or changing skin lesions.    MAKAYLA Calhoun.

## 2020-12-11 ENCOUNTER — TELEPHONE (OUTPATIENT)
Dept: DERMATOLOGY | Facility: IMAGING CENTER | Age: 70
End: 2020-12-11

## 2020-12-29 ENCOUNTER — TELEPHONE (OUTPATIENT)
Dept: MEDICAL GROUP | Facility: PHYSICIAN GROUP | Age: 70
End: 2020-12-29

## 2020-12-29 NOTE — TELEPHONE ENCOUNTER
ESTABLISHED PATIENT PRE-VISIT PLANNING     Patient was NOT contacted to complete PVP.     Note: Patient will not be contacted if there is no indication to call.     1.  Reviewed notes from the last few office visits within the medical group: No    2.  If any orders were placed at last visit or intended to be done for this visit (i.e. 6 mos follow-up), do we have Results/Consult Notes?         •  Labs - Labs were not ordered at last office visit.  Note: If patient appointment is for lab review and patient did not complete labs, check with provider if OK to reschedule patient until labs completed.       •  Imaging - Imaging was not ordered at last office visit.       •  Referrals - No referrals were ordered at last office visit.    3. Is this appointment scheduled as a Hospital Follow-Up? No    4.  Immunizations were updated in Epic using Reconcile Outside Information activity? Yes    5.  Patient is due for the following Health Maintenance Topics:   There are no preventive care reminders to display for this patient.    - Patient is up-to-date on all Health Maintenance topics. No records have been requested at this time.    6.  AHA (Pulse8) form printed for Provider? N/A

## 2021-01-04 ENCOUNTER — OFFICE VISIT (OUTPATIENT)
Dept: MEDICAL GROUP | Facility: PHYSICIAN GROUP | Age: 71
End: 2021-01-04
Payer: MEDICARE

## 2021-01-04 VITALS
HEART RATE: 70 BPM | HEIGHT: 67 IN | TEMPERATURE: 97 F | OXYGEN SATURATION: 97 % | SYSTOLIC BLOOD PRESSURE: 108 MMHG | RESPIRATION RATE: 16 BRPM | WEIGHT: 140.2 LBS | BODY MASS INDEX: 22 KG/M2 | DIASTOLIC BLOOD PRESSURE: 62 MMHG

## 2021-01-04 DIAGNOSIS — R19.8 GI SYMPTOMS: Primary | ICD-10-CM

## 2021-01-04 DIAGNOSIS — Z85.43 HISTORY OF OVARIAN CANCER: ICD-10-CM

## 2021-01-04 PROCEDURE — 99214 OFFICE O/P EST MOD 30 MIN: CPT | Performed by: FAMILY MEDICINE

## 2021-01-04 RX ORDER — POLYETHYLENE GLYCOL 3350 17 G/17G
17 POWDER, FOR SOLUTION ORAL DAILY
Qty: 850 G | Refills: 1 | Status: SHIPPED | OUTPATIENT
Start: 2021-01-04 | End: 2021-02-08 | Stop reason: SDUPTHER

## 2021-01-04 ASSESSMENT — FIBROSIS 4 INDEX: FIB4 SCORE: 1.52

## 2021-01-04 ASSESSMENT — PATIENT HEALTH QUESTIONNAIRE - PHQ9: CLINICAL INTERPRETATION OF PHQ2 SCORE: 0

## 2021-01-04 NOTE — PROGRESS NOTES
Subjective:     CC: follow up GI symptoms    HPI:   Zena presents today with     GI symptoms  She has seen a GI in the past but does not currently see one. She was on IBguard and reports she could not tolerate due to belching and peppermint taste in her mouth. She is also reporting chronic constipation with occasional diarrhea and feeling like she is not able to completely empty her bowels. She is currently taking Metamucil but is not on a laxative. Patient reports trying Miralax in the past with some relief.           Past Medical History:   Diagnosis Date   • Backpain     had back surgery, s1-l5 fusion   • Bowel obstruction (HCC)    • Cancer (HCC)     overian cancer   • Heart murmur    • Hypertension    • Indigestion     sometimes   • Other specified symptom associated with female genital organs     had ca ov the ovary   • Schwannoma of cranial nerve (HCC)        Social History     Tobacco Use   • Smoking status: Never Smoker   • Smokeless tobacco: Never Used   Substance Use Topics   • Alcohol use: Yes     Frequency: 2-3 times a week     Drinks per session: 1 or 2     Binge frequency: Never     Comment: 2 drinks per week on average    • Drug use: No       Current Outpatient Medications Ordered in Epic   Medication Sig Dispense Refill   • polyethylene glycol 3350 (MIRALAX) 17 GM/SCOOP Powder Take 17 g by mouth every day. 850 g 1   • atenolol (TENORMIN) 25 MG Tab TAKE 1 TABLET BY MOUTH EVERY EVENING 90 Tab 3   • escitalopram (LEXAPRO) 10 MG Tab TAKE 1 TABLET BY MOUTH EVERY MORNING 90 Tab 3   • fluticasone (FLONASE) 50 MCG/ACT nasal spray Spray 1 Spray in nose every day.     • Ayr Saline Nasal Gel Spray  in nose.     • LORazepam (ATIVAN) 1 MG Tab Take 1 mg by mouth.     • ondansetron (ZOFRAN) 8 MG Tab Take 8 mg by mouth.     • calcium citrate (CALCITRATE) 950 MG Tab Take 950 mg by mouth every day.     • polyethyl glycol-propyl glycol (SYSTANE) 0.4-0.3 % Solution Place 1 Drop in both eyes every morning.     •  "denosumab (PROLIA) 60 MG/ML Solution Inject 60 mg as instructed every 6 months. Next dose due: 03/19/19     • vitamin D (CHOLECALCIFEROL) 1000 UNIT TABS Take 2,000 Units by mouth every day.     • aspirin 81 MG EC tablet Take 81 mg by mouth every day.     • Multiple Vitamins-Minerals (SENIOR MULTIVITAMIN PLUS PO) Take 1 Tab by mouth every morning.     • docosahexanoic acid (OMEGA 3 FA) 1000 MG CAPS Take 1,000 mg by mouth every morning.       No current [facility]-ordered facility-administered medications on file.        Allergies:  Latex, Losartan, and Tetracycline    Health Maintenance: Completed    ROS:  Gen: no fevers/chills  Pulm: no sob  CV: no chest pain  GI: no nausea/vomiting    Objective:       Exam:  /62 (BP Location: Right arm, Patient Position: Sitting, BP Cuff Size: Adult)   Pulse 70   Temp 36.1 °C (97 °F) (Temporal)   Resp 16   Ht 1.708 m (5' 7.25\")   Wt 63.6 kg (140 lb 3.2 oz)   SpO2 97%   BMI 21.80 kg/m²  Body mass index is 21.8 kg/m².    Gen: Alert and oriented, No apparent distress.  Neck: Neck is supple without lymphadenopathy.  Lungs: Normal effort, CTA bilaterally, no wheezes, rhonchi, or rales  CV: Regular rate and rhythm. No murmurs, rubs, or gallops.  Ext: No clubbing, cyanosis, edema.      Assessment & Plan:     70 y.o. female with the following -     1. GI symptoms  This is a chronic condition.  She has had changes in her bowel habits since her ovarian cancer 8-9 years ago.  At her last appointment I wondered if there was an associated IBS that she is had a significant work-up with GI with no cause elicited.  She tried IBgard but it caused eructation and heartburn without relieving the GI symptoms so she stopped.  She has been keeping a diary and states that the last week is been good without any diarrhea with her last episode on 12/31/2020.  During that days she had a normal bowel movement but felt constipated and then later that day she had diarrhea.  Therefore, I am now suspecting " this is overflow diarrhea due to chronic constipation.  She states the Metamucil is no longer helping so we will have her stop that for now and use MiraLAX for the next month to try to reset and clear out her entire system.  - polyethylene glycol 3350 (MIRALAX) 17 GM/SCOOP Powder; Take 17 g by mouth every day.  Dispense: 850 g; Refill: 1  -Continue keeping a journal to keep track of diarrhea episodes  -If consistently having diarrhea on MiraLAX, can stop and go back to Metamucil    2. History of ovarian cancer  She has her Ca1 25 level checked every 3 months.  She will be due to have this checked in February and would like it ordered today for her oncologist review.  - ; Future    Return in about 4 weeks (around 2/1/2021) for f/u GI symptoms.    Please note that this dictation was created using voice recognition software. I have made every reasonable attempt to correct obvious errors, but I expect that there are errors of grammar and possibly content that I did not discover before finalizing the note.

## 2021-01-04 NOTE — ASSESSMENT & PLAN NOTE
She has seen a GI in the past but does not currently see one. She was on IBguard and reports she could not tolerate due to belching and peppermint taste in her mouth. She is also reporting chronic constipation with occasional diarrhea and feeling like she is not able to completely empty her bowels. She is currently taking Metamucil but is not on a laxative. Patient reports trying Miralax in the past with some relief.

## 2021-01-25 ENCOUNTER — HOSPITAL ENCOUNTER (OUTPATIENT)
Dept: LAB | Facility: MEDICAL CENTER | Age: 71
End: 2021-01-25
Attending: FAMILY MEDICINE
Payer: MEDICARE

## 2021-01-25 DIAGNOSIS — Z85.43 HISTORY OF OVARIAN CANCER: ICD-10-CM

## 2021-01-25 LAB — CANCER AG125 SERPL-ACNC: 14.2 U/ML (ref 0–35)

## 2021-01-25 PROCEDURE — 86304 IMMUNOASSAY TUMOR CA 125: CPT

## 2021-01-25 PROCEDURE — 36415 COLL VENOUS BLD VENIPUNCTURE: CPT

## 2021-02-08 ENCOUNTER — TELEMEDICINE (OUTPATIENT)
Dept: MEDICAL GROUP | Facility: PHYSICIAN GROUP | Age: 71
End: 2021-02-08
Payer: MEDICARE

## 2021-02-08 VITALS — WEIGHT: 140 LBS | BODY MASS INDEX: 21.97 KG/M2 | HEIGHT: 67 IN

## 2021-02-08 DIAGNOSIS — K59.09 OTHER CONSTIPATION: Primary | ICD-10-CM

## 2021-02-08 DIAGNOSIS — Z12.11 COLON CANCER SCREENING: ICD-10-CM

## 2021-02-08 DIAGNOSIS — Z85.43 HISTORY OF OVARIAN CANCER: ICD-10-CM

## 2021-02-08 PROCEDURE — 99213 OFFICE O/P EST LOW 20 MIN: CPT | Mod: 95,CR | Performed by: FAMILY MEDICINE

## 2021-02-08 RX ORDER — POLYETHYLENE GLYCOL 3350 17 G/17G
17 POWDER, FOR SOLUTION ORAL DAILY
Qty: 850 G | Refills: 3 | Status: SHIPPED | OUTPATIENT
Start: 2021-02-08 | End: 2021-09-13

## 2021-02-08 ASSESSMENT — FIBROSIS 4 INDEX: FIB4 SCORE: 1.52

## 2021-02-08 NOTE — PROGRESS NOTES
Virtual Visit: Established Patient   This visit was conducted via Zoom using secure and encrypted videoconferencing technology. The patient was in a private location in the state of [place].    The patient's identity was confirmed and verbal consent was obtained for this virtual visit.    Subjective:   CC:   Chief Complaint   Patient presents with   • Constipation     Miralax working well - BM more normal       Zena Paz is a 70 y.o. female presenting for evaluation and management of:    Other constipation  The is a chronic condition.  She has had changes in her bowel habits since her ovarian cancer 8-9 years ago.  She had seen GI in the past and reports an extensive work-up with no etiology noted.  She tried IBgard for suspected IBS but it caused burping and peppermint taste in her mouth.  Last appointment I suspect that she may have some overflow diarrhea as she was switching back and forth from constipation to diarrhea within the same day so I prescribed MiraLAX. She reports it helped a lot. She had diarrhea a couple times in the last month and now doesn't have frequent BMs during the day.       ROS   Denies any recent fevers or chills. No chest pains or shortness of breath.     Allergies   Allergen Reactions   • Latex Rash     Rash     • Losartan      palpitations   • Tetracycline      Sensitivity to sun; sun burns.  Reaction date; 15 years ago from 2013.       Current medicines (including changes today)  Current Outpatient Medications   Medication Sig Dispense Refill   • polyethylene glycol 3350 (MIRALAX) 17 GM/SCOOP Powder Take 17 g by mouth every day. 850 g 3   • atenolol (TENORMIN) 25 MG Tab TAKE 1 TABLET BY MOUTH EVERY EVENING 90 Tab 3   • escitalopram (LEXAPRO) 10 MG Tab TAKE 1 TABLET BY MOUTH EVERY MORNING 90 Tab 3   • fluticasone (FLONASE) 50 MCG/ACT nasal spray Spray 1 Spray in nose every day.     • Ayr Saline Nasal Gel Spray  in nose.     • LORazepam (ATIVAN) 1 MG Tab Take 1 mg by mouth.    "  • ondansetron (ZOFRAN) 8 MG Tab Take 8 mg by mouth.     • calcium citrate (CALCITRATE) 950 MG Tab Take 950 mg by mouth every day.     • polyethyl glycol-propyl glycol (SYSTANE) 0.4-0.3 % Solution Place 1 Drop in both eyes every morning.     • denosumab (PROLIA) 60 MG/ML Solution Inject 60 mg as instructed every 6 months. Next dose due: 03/19/19     • vitamin D (CHOLECALCIFEROL) 1000 UNIT TABS Take 2,000 Units by mouth every day.     • aspirin 81 MG EC tablet Take 81 mg by mouth every day.     • Multiple Vitamins-Minerals (SENIOR MULTIVITAMIN PLUS PO) Take 1 Tab by mouth every morning.     • docosahexanoic acid (OMEGA 3 FA) 1000 MG CAPS Take 1,000 mg by mouth every morning.       No current facility-administered medications for this visit.        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  She  has a past surgical history that includes hysterectomy radical (2012); oophorectomy (Bilateral, 2012); lumbar fusion posterior (2001); and craniotomy (1993).       Objective:   Ht 1.708 m (5' 7.25\") Comment: Patient reported  Wt " 63.5 kg (140 lb) Comment: Patient reported  BMI 21.76 kg/m²  RR: 12    Physical Exam:  Constitutional: Alert, no distress, well-groomed.  Skin: No rashes in visible areas.  Eye: Round. Conjunctiva clear, lids normal. No icterus.   ENMT: Lips pink without lesions, good dentition, moist mucous membranes. Phonation normal.  Neck: No masses, no thyromegaly. Moves freely without pain.  Respiratory: Unlabored respiratory effort, no cough or audible wheeze  Psych: Alert and oriented x3, normal affect and mood.       Assessment and Plan:   The following treatment plan was discussed:     1. Other constipation  This is a chronic condition, improved. She has had changes in her bowel habits for 8-9 years since her ovarian cancer.  She reports that she saw GI with an extensive work-up and no cause was elicited.  She continues to struggle with constipation and diarrhea and enteritis appointment I suspected that it was overflow diarrhea as she would go from constipation to diarrhea in the same day.  Therefore, I prescribed her MiraLAX to use daily and she states is worked wonders.  She now does not have frequent bowel movements throughout the day and she is got her life back and she is not really had any diarrhea since her last visit except for a couple of occasions.  - polyethylene glycol 3350 (MIRALAX) 17 GM/SCOOP Powder; Take 17 g by mouth every day.  Dispense: 850 g; Refill: 3    2. History of ovarian cancer  This is a chronic condition, stable.  She gets a CA-125 checked every 3 months and the labs been ordered for the next check.  - ; Future    3. Colon cancer screening  - REFERRAL TO GI FOR COLONOSCOPY    Follow-up: Return in about 6 months (around 8/8/2021) for Medicare Wellness.

## 2021-02-08 NOTE — ASSESSMENT & PLAN NOTE
The is a chronic condition.  She has had changes in her bowel habits since her ovarian cancer 8-9 years ago.  She had seen GI in the past and reports an extensive work-up with no etiology noted.  She tried IBgard for suspected IBS but it caused burping and peppermint taste in her mouth.  Last appointment I suspect that she may have some overflow diarrhea as she was switching back and forth from constipation to diarrhea within the same day so I prescribed MiraLAX. She reports it helped a lot. She had diarrhea a couple times in the last month and now doesn't have frequent BMs during the day.

## 2021-03-03 DIAGNOSIS — Z23 NEED FOR VACCINATION: ICD-10-CM

## 2021-03-25 RX ORDER — ESCITALOPRAM OXALATE 10 MG/1
TABLET ORAL
Qty: 90 TABLET | Refills: 3 | Status: SHIPPED | OUTPATIENT
Start: 2021-03-25 | End: 2021-12-27

## 2021-04-27 ENCOUNTER — HOSPITAL ENCOUNTER (OUTPATIENT)
Dept: LAB | Facility: MEDICAL CENTER | Age: 71
End: 2021-04-27
Attending: FAMILY MEDICINE
Payer: MEDICARE

## 2021-04-27 DIAGNOSIS — M81.0 AGE-RELATED OSTEOPOROSIS WITHOUT CURRENT PATHOLOGICAL FRACTURE: ICD-10-CM

## 2021-04-27 DIAGNOSIS — Z85.43 HISTORY OF OVARIAN CANCER: ICD-10-CM

## 2021-04-27 LAB
ANION GAP SERPL CALC-SCNC: 11 MMOL/L (ref 7–16)
BUN SERPL-MCNC: 31 MG/DL (ref 8–22)
CALCIUM SERPL-MCNC: 10 MG/DL (ref 8.5–10.5)
CANCER AG125 SERPL-ACNC: 15.2 U/ML (ref 0–35)
CHLORIDE SERPL-SCNC: 103 MMOL/L (ref 96–112)
CO2 SERPL-SCNC: 26 MMOL/L (ref 20–33)
CREAT SERPL-MCNC: 1.35 MG/DL (ref 0.5–1.4)
GLUCOSE SERPL-MCNC: 103 MG/DL (ref 65–99)
POTASSIUM SERPL-SCNC: 4.9 MMOL/L (ref 3.6–5.5)
SODIUM SERPL-SCNC: 140 MMOL/L (ref 135–145)

## 2021-04-27 PROCEDURE — 86304 IMMUNOASSAY TUMOR CA 125: CPT

## 2021-04-27 PROCEDURE — 80048 BASIC METABOLIC PNL TOTAL CA: CPT

## 2021-04-27 PROCEDURE — 82306 VITAMIN D 25 HYDROXY: CPT

## 2021-04-27 PROCEDURE — 36415 COLL VENOUS BLD VENIPUNCTURE: CPT

## 2021-04-29 LAB — 25(OH)D3 SERPL-MCNC: 38 NG/ML (ref 30–80)

## 2021-05-21 DIAGNOSIS — Z00.6 RESEARCH STUDY PATIENT: ICD-10-CM

## 2021-05-25 ENCOUNTER — HOSPITAL ENCOUNTER (OUTPATIENT)
Facility: MEDICAL CENTER | Age: 71
End: 2021-05-25
Attending: PATHOLOGY
Payer: COMMERCIAL

## 2021-05-25 DIAGNOSIS — Z00.6 RESEARCH STUDY PATIENT: ICD-10-CM

## 2021-06-03 LAB
ELF SCORE: 9.6
RELATIVE RISK: NORMAL
RISK GROUP: NORMAL
RISK: 3.3 %

## 2021-09-12 SDOH — ECONOMIC STABILITY: INCOME INSECURITY: IN THE LAST 12 MONTHS, WAS THERE A TIME WHEN YOU WERE NOT ABLE TO PAY THE MORTGAGE OR RENT ON TIME?: NO

## 2021-09-12 SDOH — HEALTH STABILITY: PHYSICAL HEALTH: ON AVERAGE, HOW MANY MINUTES DO YOU ENGAGE IN EXERCISE AT THIS LEVEL?: 50 MIN

## 2021-09-12 SDOH — ECONOMIC STABILITY: INCOME INSECURITY: HOW HARD IS IT FOR YOU TO PAY FOR THE VERY BASICS LIKE FOOD, HOUSING, MEDICAL CARE, AND HEATING?: NOT HARD AT ALL

## 2021-09-12 SDOH — ECONOMIC STABILITY: FOOD INSECURITY: WITHIN THE PAST 12 MONTHS, YOU WORRIED THAT YOUR FOOD WOULD RUN OUT BEFORE YOU GOT MONEY TO BUY MORE.: NEVER TRUE

## 2021-09-12 SDOH — HEALTH STABILITY: PHYSICAL HEALTH: ON AVERAGE, HOW MANY DAYS PER WEEK DO YOU ENGAGE IN MODERATE TO STRENUOUS EXERCISE (LIKE A BRISK WALK)?: 7 DAYS

## 2021-09-12 SDOH — ECONOMIC STABILITY: TRANSPORTATION INSECURITY
IN THE PAST 12 MONTHS, HAS LACK OF TRANSPORTATION KEPT YOU FROM MEETINGS, WORK, OR FROM GETTING THINGS NEEDED FOR DAILY LIVING?: NO

## 2021-09-12 SDOH — ECONOMIC STABILITY: FOOD INSECURITY: WITHIN THE PAST 12 MONTHS, THE FOOD YOU BOUGHT JUST DIDN'T LAST AND YOU DIDN'T HAVE MONEY TO GET MORE.: NEVER TRUE

## 2021-09-12 SDOH — ECONOMIC STABILITY: HOUSING INSECURITY: IN THE LAST 12 MONTHS, HOW MANY PLACES HAVE YOU LIVED?: 1

## 2021-09-12 ASSESSMENT — SOCIAL DETERMINANTS OF HEALTH (SDOH)
HOW HARD IS IT FOR YOU TO PAY FOR THE VERY BASICS LIKE FOOD, HOUSING, MEDICAL CARE, AND HEATING?: NOT HARD AT ALL
WITHIN THE PAST 12 MONTHS, YOU WORRIED THAT YOUR FOOD WOULD RUN OUT BEFORE YOU GOT THE MONEY TO BUY MORE: NEVER TRUE
HOW OFTEN DO YOU ATTEND CHURCH OR RELIGIOUS SERVICES?: NEVER
HOW MANY DRINKS CONTAINING ALCOHOL DO YOU HAVE ON A TYPICAL DAY WHEN YOU ARE DRINKING: 1 OR 2
HOW OFTEN DO YOU GET TOGETHER WITH FRIENDS OR RELATIVES?: THREE TIMES A WEEK
HOW OFTEN DO YOU GET TOGETHER WITH FRIENDS OR RELATIVES?: THREE TIMES A WEEK
DO YOU BELONG TO ANY CLUBS OR ORGANIZATIONS SUCH AS CHURCH GROUPS UNIONS, FRATERNAL OR ATHLETIC GROUPS, OR SCHOOL GROUPS?: YES
IN A TYPICAL WEEK, HOW MANY TIMES DO YOU TALK ON THE PHONE WITH FAMILY, FRIENDS, OR NEIGHBORS?: TWICE A WEEK
IN A TYPICAL WEEK, HOW MANY TIMES DO YOU TALK ON THE PHONE WITH FAMILY, FRIENDS, OR NEIGHBORS?: TWICE A WEEK
HOW OFTEN DO YOU HAVE SIX OR MORE DRINKS ON ONE OCCASION: NEVER
HOW OFTEN DO YOU ATTEND CHURCH OR RELIGIOUS SERVICES?: NEVER
HOW OFTEN DO YOU ATTENT MEETINGS OF THE CLUB OR ORGANIZATION YOU BELONG TO?: MORE THAN 4 TIMES PER YEAR
HOW OFTEN DO YOU HAVE A DRINK CONTAINING ALCOHOL: 2-3 TIMES A WEEK
DO YOU BELONG TO ANY CLUBS OR ORGANIZATIONS SUCH AS CHURCH GROUPS UNIONS, FRATERNAL OR ATHLETIC GROUPS, OR SCHOOL GROUPS?: YES
HOW OFTEN DO YOU ATTENT MEETINGS OF THE CLUB OR ORGANIZATION YOU BELONG TO?: MORE THAN 4 TIMES PER YEAR

## 2021-09-12 ASSESSMENT — LIFESTYLE VARIABLES
HOW MANY STANDARD DRINKS CONTAINING ALCOHOL DO YOU HAVE ON A TYPICAL DAY: 1 OR 2
HOW OFTEN DO YOU HAVE A DRINK CONTAINING ALCOHOL: 2-3 TIMES A WEEK
HOW OFTEN DO YOU HAVE SIX OR MORE DRINKS ON ONE OCCASION: NEVER

## 2021-09-13 ENCOUNTER — OFFICE VISIT (OUTPATIENT)
Dept: MEDICAL GROUP | Facility: PHYSICIAN GROUP | Age: 71
End: 2021-09-13
Payer: MEDICARE

## 2021-09-13 VITALS
HEART RATE: 61 BPM | WEIGHT: 136.2 LBS | BODY MASS INDEX: 21.38 KG/M2 | RESPIRATION RATE: 16 BRPM | TEMPERATURE: 97.2 F | SYSTOLIC BLOOD PRESSURE: 122 MMHG | HEIGHT: 67 IN | OXYGEN SATURATION: 99 % | DIASTOLIC BLOOD PRESSURE: 60 MMHG

## 2021-09-13 DIAGNOSIS — G62.9 NEUROPATHY: ICD-10-CM

## 2021-09-13 DIAGNOSIS — M25.561 CHRONIC PAIN OF RIGHT KNEE: ICD-10-CM

## 2021-09-13 DIAGNOSIS — H92.01 RIGHT EAR PAIN: ICD-10-CM

## 2021-09-13 DIAGNOSIS — Z23 NEED FOR VACCINATION: ICD-10-CM

## 2021-09-13 DIAGNOSIS — N18.32 STAGE 3B CHRONIC KIDNEY DISEASE: ICD-10-CM

## 2021-09-13 DIAGNOSIS — M81.0 AGE-RELATED OSTEOPOROSIS WITHOUT CURRENT PATHOLOGICAL FRACTURE: ICD-10-CM

## 2021-09-13 DIAGNOSIS — I10 ESSENTIAL HYPERTENSION: ICD-10-CM

## 2021-09-13 DIAGNOSIS — Z12.31 ENCOUNTER FOR SCREENING MAMMOGRAM FOR MALIGNANT NEOPLASM OF BREAST: ICD-10-CM

## 2021-09-13 DIAGNOSIS — G89.29 CHRONIC PAIN OF RIGHT KNEE: ICD-10-CM

## 2021-09-13 DIAGNOSIS — Z85.43 HISTORY OF OVARIAN CANCER: ICD-10-CM

## 2021-09-13 PROCEDURE — 90471 IMMUNIZATION ADMIN: CPT | Performed by: FAMILY MEDICINE

## 2021-09-13 PROCEDURE — 99214 OFFICE O/P EST MOD 30 MIN: CPT | Mod: 25 | Performed by: FAMILY MEDICINE

## 2021-09-13 PROCEDURE — 90632 HEPA VACCINE ADULT IM: CPT | Performed by: FAMILY MEDICINE

## 2021-09-13 RX ORDER — ALENDRONATE SODIUM 70 MG/1
70 TABLET ORAL
COMMUNITY
Start: 2021-07-30 | End: 2022-02-09

## 2021-09-14 NOTE — ASSESSMENT & PLAN NOTE
This is a chronic condition, stable.  She has chronic kidney disease secondary to chemotherapy.  She follows with [facility] nephrology who is monitoring and managing this condition.  Recently her phosphorus level was increased and the plan was to recheck it in a year.  We did briefly discuss me adding it to her lab work today but I informed her that I will not be able to help interpret it and there is a risk of getting too many cooks in the kitchen regarding monitoring of condition and she agreed that she would like the nephrologist to be the sole person who monitors this for now.

## 2021-09-14 NOTE — ASSESSMENT & PLAN NOTE
This is a chronic condition, stable.  She had a longstanding history of pain in the right knee but has recently started Pilates which is been helping the pain.  She did see orthopedics and they would like to hold off any further intervention since she has noticed some improvement.  She will follow with them as directed.

## 2021-09-14 NOTE — ASSESSMENT & PLAN NOTE
This is a chronic condition, stable.  She follows with [facility] endocrinology who is monitoring and managing this condition.  She recently transition from Prolia to Fosamax as she has been on Prolia for approximately 6 years.  She will continue to follow with endocrinology as directed.

## 2021-09-14 NOTE — PROGRESS NOTES
Subjective:     CC: discuss recent specialist visits    HPI:   Zena presents today with     Problem   Right Ear Pain    This is an acute condition. Intermittent pain in right ear. H/o cerumen impaction in the past and wonders if that is occurring again. No drainage from the ear. Last episode was a couple of weeks ago.      Chronic Pain of Right Knee    This is a chronic condition. She has seen orthopedics. Recently her knee has been doing better, so ortho wanted to hold off on any further intervention. Possibly could use corticosteroid injection in future if needed.      Chronic kidney disease, stage 3, mod decreased GFR (HCC)    This is a chronic condition. Precipitated by chemotherapy. She follows with [facility] nephrology who is monitoring and managing. Recently her phosphorus level increased and neither nephrology or endocrinology can explain why, so the plan currently is to monitor.      Age-Related Osteoporosis Without Current Pathological Fracture    This is a chronic condition. She developed osteoporosis from her chemotherapy. She had been on Prolia since 2015 and 7/2021 she was transitioned to fosamax. In 8/2021 had some lab work via [facility] endocrinology not showing it is working yet, so the plan is to repeat those labs in 10/2021. She denies any side effects to the fosamax.     Neuropathy    This is a chronic condition. This was also precipitated by chemotherapy. She feels it mostly in her feet and occasionally her hands. It mostly bothers her at night. There is no pain, just tingling. It will periodically worsen. She did try gabapentin but it made her dizzy. For now able to manage.        Htn (Hypertension)    This is a chronic condition. This is managed by cardiology at [facility].  Current Meds: atenolol 25 mg daily  Side effects: none  Home BP Log: none  Associated symptoms: no cp, no sob           Current Outpatient Medications Ordered in Epic   Medication Sig Dispense Refill   • escitalopram (LEXAPRO) 10  "MG Tab TAKE 1 TABLET BY MOUTH EVERY MORNING 90 tablet 3   • atenolol (TENORMIN) 25 MG Tab TAKE 1 TABLET BY MOUTH EVERY EVENING 90 Tab 3   • fluticasone (FLONASE) 50 MCG/ACT nasal spray Spray 1 Spray in nose every day.     • Ayr Saline Nasal Gel Spray  in nose.     • LORazepam (ATIVAN) 1 MG Tab Take 1 mg by mouth.     • ondansetron (ZOFRAN) 8 MG Tab Take 8 mg by mouth.     • calcium citrate (CALCITRATE) 950 MG Tab Take 950 mg by mouth every day.     • polyethyl glycol-propyl glycol (SYSTANE) 0.4-0.3 % Solution Place 1 Drop in both eyes every morning.     • vitamin D (CHOLECALCIFEROL) 1000 UNIT TABS Take 2,000 Units by mouth every day.     • aspirin 81 MG EC tablet Take 81 mg by mouth every day.     • Multiple Vitamins-Minerals (SENIOR MULTIVITAMIN PLUS PO) Take 1 Tab by mouth every morning.     • docosahexanoic acid (OMEGA 3 FA) 1000 MG CAPS Take 1,000 mg by mouth every morning.     • alendronate (FOSAMAX) 70 MG Tab Take 70 mg by mouth every 7 days.       No current [facility]-ordered facility-administered medications on file.       Health Maintenance: Completed    ROS:  Gen: no fevers/chills  Pulm: no sob  CV: no chest pain    Objective:     Exam:  /60 (BP Location: Left arm, Patient Position: Sitting, BP Cuff Size: Adult)   Pulse 61   Temp 36.2 °C (97.2 °F) (Temporal)   Resp 16   Ht 1.708 m (5' 7.25\")   Wt 61.8 kg (136 lb 3.2 oz)   SpO2 99%   BMI 21.17 kg/m²  Body mass index is 21.17 kg/m².    Gen: Alert and oriented, No apparent distress.  Ears: Bilateral canals clear with normal TMs  Neck: Neck is supple without lymphadenopathy.  Lungs: Normal effort, CTA bilaterally, no wheezes, rhonchi, or rales  CV: Regular rate and rhythm. No murmurs, rubs, or gallops.  Ext: No clubbing, cyanosis, edema.    Assessment & Plan:     71 y.o. female with the following -     Problem List Items Addressed This Visit     HTN (hypertension)    History of ovarian cancer    Relevant Orders        Neuropathy     This is a " chronic condition, stable.  She developed neuropathy secondary to her chemotherapy.  There will be worse days and good days with the symptoms.  She is tried gabapentin in the past but it made her dizzy so she is not on any medication.  TCA is not an option as she is already on an SSRI.  We did briefly discuss trying Lyrica but because it is a similar to gabapentin I could not guarantee she would not get side effects either and she states that the symptoms are quite tolerable so we will continue to monitor for now.         Chronic kidney disease, stage 3, mod decreased GFR (HCC)     This is a chronic condition, stable.  She has chronic kidney disease secondary to chemotherapy.  She follows with [facility] nephrology who is monitoring and managing this condition.  Recently her phosphorus level was increased and the plan was to recheck it in a year.  We did briefly discuss me adding it to her lab work today but I informed her that I will not be able to help interpret it and there is a risk of getting too many cooks in the kitchen regarding monitoring of condition and she agreed that she would like the nephrologist to be the sole person who monitors this for now.         Age-related osteoporosis without current pathological fracture     This is a chronic condition, stable.  She follows with [facility] endocrinology who is monitoring and managing this condition.  She recently transition from Prolia to Fosamax as she has been on Prolia for approximately 6 years.  She will continue to follow with endocrinology as directed.         Chronic pain of right knee     This is a chronic condition, stable.  She had a longstanding history of pain in the right knee but has recently started Pilates which is been helping the pain.  She did see orthopedics and they would like to hold off any further intervention since she has noticed some improvement.  She will follow with them as directed.         Right ear pain     This is a chronic, intermittent  condition.  She states about 2 weeks ago she did have some pain in her right ear which has occurred in the past when there is a cerumen impaction.  The pain self resolved and on exam she has no cerumen impaction so we will monitor for now.           Other Visit Diagnoses     Encounter for screening mammogram for malignant neoplasm of breast        Relevant Orders    MA-SCREENING MAMMO BILAT W/TOMOSYNTHESIS W/CAD    US-SCREENING WHOLE BREAST BILATERAL (3D SCREENING)    Need for vaccination        Relevant Orders    Hep A Adult 19+ (Completed)          I spent a total of 32 minutes with record review, exam, communication with the patient, and documentation of this encounter.      Return for Medicare Wellness.    Please note that this dictation was created using voice recognition software. I have made every reasonable attempt to correct obvious errors, but I expect that there are errors of grammar and possibly content that I did not discover before finalizing the note.

## 2021-09-14 NOTE — ASSESSMENT & PLAN NOTE
This is a chronic condition, stable.  She developed neuropathy secondary to her chemotherapy.  There will be worse days and good days with the symptoms.  She is tried gabapentin in the past but it made her dizzy so she is not on any medication.  TCA is not an option as she is already on an SSRI.  We did briefly discuss trying Lyrica but because it is a similar to gabapentin I could not guarantee she would not get side effects either and she states that the symptoms are quite tolerable so we will continue to monitor for now.

## 2021-09-14 NOTE — ASSESSMENT & PLAN NOTE
This is a chronic, intermittent condition.  She states about 2 weeks ago she did have some pain in her right ear which has occurred in the past when there is a cerumen impaction.  The pain self resolved and on exam she has no cerumen impaction so we will monitor for now.

## 2021-09-23 RX ORDER — ATENOLOL 25 MG/1
TABLET ORAL
Qty: 90 TABLET | Refills: 3 | Status: SHIPPED | OUTPATIENT
Start: 2021-09-23 | End: 2022-03-30

## 2021-09-28 ENCOUNTER — NON-PROVIDER VISIT (OUTPATIENT)
Dept: MEDICAL GROUP | Facility: PHYSICIAN GROUP | Age: 71
End: 2021-09-28
Payer: MEDICARE

## 2021-09-28 DIAGNOSIS — Z23 NEED FOR VACCINATION: ICD-10-CM

## 2021-09-28 PROCEDURE — 90662 IIV NO PRSV INCREASED AG IM: CPT | Performed by: FAMILY MEDICINE

## 2021-09-28 PROCEDURE — G0008 ADMIN INFLUENZA VIRUS VAC: HCPCS | Performed by: FAMILY MEDICINE

## 2021-09-28 NOTE — PROGRESS NOTES
"Padmini Paz is a 71 y.o. female here for a non-provider visit for:   FLU    Reason for immunization: Annual Flu Vaccine  Immunization records indicate need for vaccine: Yes, confirmed with Epic  Minimum interval has been met for this vaccine: Yes  ABN completed: No    VIS Dated  8/6/21 was given to patient: Yes  All IAC Questionnaire questions were answered \"No.\"    Patient tolerated injection and no adverse effects were observed or reported: Yes    Pt scheduled for next dose in series: No  "

## 2021-10-14 ENCOUNTER — OFFICE VISIT (OUTPATIENT)
Dept: MEDICAL GROUP | Facility: PHYSICIAN GROUP | Age: 71
End: 2021-10-14
Payer: MEDICARE

## 2021-10-14 VITALS
BODY MASS INDEX: 21.16 KG/M2 | OXYGEN SATURATION: 96 % | TEMPERATURE: 97 F | HEART RATE: 62 BPM | DIASTOLIC BLOOD PRESSURE: 60 MMHG | HEIGHT: 67 IN | SYSTOLIC BLOOD PRESSURE: 118 MMHG | RESPIRATION RATE: 16 BRPM | WEIGHT: 134.8 LBS

## 2021-10-14 DIAGNOSIS — G89.29 CHRONIC PAIN OF RIGHT KNEE: ICD-10-CM

## 2021-10-14 DIAGNOSIS — M81.0 AGE-RELATED OSTEOPOROSIS WITHOUT CURRENT PATHOLOGICAL FRACTURE: ICD-10-CM

## 2021-10-14 DIAGNOSIS — I10 PRIMARY HYPERTENSION: ICD-10-CM

## 2021-10-14 DIAGNOSIS — R23.2 HOT FLASHES: ICD-10-CM

## 2021-10-14 DIAGNOSIS — M25.561 CHRONIC PAIN OF RIGHT KNEE: ICD-10-CM

## 2021-10-14 DIAGNOSIS — N18.32 STAGE 3B CHRONIC KIDNEY DISEASE: ICD-10-CM

## 2021-10-14 DIAGNOSIS — K59.09 OTHER CONSTIPATION: ICD-10-CM

## 2021-10-14 DIAGNOSIS — Z11.52 ENCOUNTER FOR SCREENING FOR COVID-19: ICD-10-CM

## 2021-10-14 DIAGNOSIS — Z00.00 ENCOUNTER FOR MEDICARE ANNUAL WELLNESS EXAM: Primary | ICD-10-CM

## 2021-10-14 DIAGNOSIS — G62.9 NEUROPATHY: ICD-10-CM

## 2021-10-14 PROBLEM — H92.01 RIGHT EAR PAIN: Status: RESOLVED | Noted: 2020-03-16 | Resolved: 2021-10-14

## 2021-10-14 PROCEDURE — G0439 PPPS, SUBSEQ VISIT: HCPCS | Performed by: FAMILY MEDICINE

## 2021-10-14 RX ORDER — SULFAMETHOXAZOLE AND TRIMETHOPRIM 800; 160 MG/1; MG/1
1 TABLET ORAL 2 TIMES DAILY
Qty: 6 TABLET | Refills: 0 | Status: SHIPPED | OUTPATIENT
Start: 2021-10-14 | End: 2021-10-17

## 2021-10-14 RX ORDER — AZITHROMYCIN 500 MG/1
500 TABLET, FILM COATED ORAL DAILY
Qty: 6 TABLET | Refills: 0 | Status: SHIPPED | OUTPATIENT
Start: 2021-10-14 | End: 2021-10-17

## 2021-10-14 ASSESSMENT — PATIENT HEALTH QUESTIONNAIRE - PHQ9: CLINICAL INTERPRETATION OF PHQ2 SCORE: 0

## 2021-10-14 ASSESSMENT — ENCOUNTER SYMPTOMS: GENERAL WELL-BEING: GOOD

## 2021-10-14 ASSESSMENT — ACTIVITIES OF DAILY LIVING (ADL): BATHING_REQUIRES_ASSISTANCE: 0

## 2021-10-14 NOTE — PROGRESS NOTES
Chief Complaint   Patient presents with   • Annual Wellness Visit         HPI:  Padmini is a 71 y.o. 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 6 Tablet 0   • azithromycin (ZITHROMAX) 500 MG tablet Take 1 Tablet by mouth every day for 3 days. 6 Tablet 0   • atenolol (TENORMIN) 25 MG Tab TAKE 1 TABLET BY MOUTH EVERY EVENING 90 Tablet 3   • alendronate (FOSAMAX) 70 MG Tab Take 70 mg by mouth every 7 days.     • escitalopram (LEXAPRO) 10 MG Tab TAKE 1 TABLET BY MOUTH EVERY MORNING 90 tablet 3   • fluticasone (FLONASE) 50 MCG/ACT nasal spray Spray 1 Spray in nose every day.     • Ayr Saline Nasal Gel Spray  in nose.     • LORazepam (ATIVAN) 1 MG Tab Take 1 mg by mouth.     • ondansetron (ZOFRAN) 8 MG Tab Take 8 mg by mouth.     • calcium citrate (CALCITRATE) 950 MG Tab Take 950 mg by mouth every day.     • polyethyl glycol-propyl glycol (SYSTANE) 0.4-0.3 % Solution Place 1 Drop in both eyes every morning.     • vitamin D (CHOLECALCIFEROL) 1000 UNIT TABS Take 2,000 Units by mouth every day.     • aspirin 81 MG EC tablet Take 81 mg by mouth every day.     • Multiple Vitamins-Minerals (SENIOR MULTIVITAMIN PLUS PO)  Take 1 Tab by mouth every morning.     • docosahexanoic acid (OMEGA 3 FA) 1000 MG CAPS Take 1,000 mg by mouth every morning.       No current facility-administered medications for this visit.        Patient is taking medications as noted in medication list.  Current supplements as per medication list.     Allergies: Latex, Losartan, and Tetracycline    Current social contact/activities: sing in choir     Is patient current with immunizations? Yes.    She  reports that she has never smoked. She has never used smokeless tobacco. She reports current alcohol use. She reports that she does not use drugs.  Counseling given: Yes    DPA/Advanced directive: Patient has Advanced Directive on file.     ROS:    Gait: Uses no assistive device   Ostomy: No   Other tubes: No   Amputations: No   Chronic oxygen use No   Last eye exam 10/2021  Wears hearing aids: No   : Denies any urinary leakage during the last 6 months    Screening:    Depression Screening    Little interest or pleasure in doing things?  0 - not at all  Feeling down, depressed, or hopeless? 0 - not at all  Patient Health Questionnaire Score: 0    If depressive symptoms identified deferred to follow up visit unless specifically addressed in assessment and plan.    Interpretation of PHQ-9 Total Score   Score Severity   1-4 No Depression   5-9 Mild Depression   10-14 Moderate Depression   15-19 Moderately Severe Depression   20-27 Severe Depression    Screening for Cognitive Impairment    Three Minute Recall (captain, garden, picture)  3/3    Kali clock face with all 12 numbers and set the hands to show 5 past 8.  Yes    If cognitive concerns identified, deferred for follow up unless specifically addressed in assessment and plan.    Fall Risk Assessment    Has the patient had two or more falls in the last year or any fall with injury in the last year?  No  If fall risk identified, deferred for follow up unless specifically addressed in assessment and plan.    Safety  Assessment    Throw rugs on floor.  Yes  Handrails on all stairs.  No  Good lighting in all hallways.  Yes  Difficulty hearing.  No  Patient counseled about all safety risks that were identified.    Functional Assessment ADLs    Are there any barriers preventing you from cooking for yourself or meeting nutritional needs?  No.    Are there any barriers preventing you from driving safely or obtaining transportation?  No.    Are there any barriers preventing you from using a telephone or calling for help?  No.    Are there any barriers preventing you from shopping?  No.    Are there any barriers preventing you from taking care of your own finances?  No.    Are there any barriers preventing you from managing your medications?  No.    Are there any barriers preventing you from showering, bathing or dressing yourself?  No.    Are you currently engaging in any exercise or physical activity?  Yes.     What is your perception of your health?  Good.    Health Maintenance Summary                MAMMOGRAM Overdue 9/28/2021      Done 9/28/2020 MA-SCREENING MAMMO BILAT W/TOMOSYNTHESIS W/CAD     Patient has more history with this topic...    Annual Wellness Visit Next Due 10/15/2022      Done 10/14/2021 Visit Dx: Encounter for Medicare annual wellness exam     Patient has more history with this topic...    BONE DENSITY Next Due 1/6/2025      Done 1/6/2020 Ext Proc: DS-BONE DENSITY STUDY (DEXA)    COLORECTAL CANCER SCREENING Next Due 7/19/2026     IMM DTaP/Tdap/Td Vaccine Next Due 11/10/2030      Done 11/10/2020 Imm Admin: Tdap Vaccine     Patient has more history with this topic...          Patient Care Team:  Martina Grajeda M.D. as PCP - General (Family Medicine)  Yadira Jaffe, PT, MPT, OCS (Inactive) as Physical Therapist (Physical Therapy)  Neil Scott M.D. (Obstetrics & Gynecology)  Marcelo Bucio as Consulting Physician (Nephrology)  Neelam Montalvo M.D. as Consulting Physician (Cardiovascular Disease  "(Cardiology))  Juan Dudley M.D. as Consulting Physician (Orthopedic Surgery)  Jose Ferrer M.D. as Consulting Physician (Gastroenterology)    Social History     Tobacco Use   • Smoking status: Never Smoker   • Smokeless tobacco: Never Used   Vaping Use   • Vaping Use: Never used   Substance Use Topics   • Alcohol use: Yes     Comment: 2 drinks per week on average    • Drug use: No     Family History   Problem Relation Age of Onset   • Heart Disease Mother    • Stroke Father      She  has a past medical history of Backpain, Bowel obstruction (HCC), Cancer (HCC), Heart murmur, Hypertension, Indigestion, Other specified symptom associated with female genital organs, and Schwannoma of cranial nerve (HCC).   Past Surgical History:   Procedure Laterality Date   • HYSTERECTOMY RADICAL  2012   • OOPHORECTOMY Bilateral 2012   • LUMBAR FUSION POSTERIOR  2001   • CRANIOTOMY  1993    Schwannoma L 5th CN     Exam:     /60 (BP Location: Right arm, Patient Position: Sitting, BP Cuff Size: Adult)   Pulse 62   Temp 36.1 °C (97 °F) (Temporal)   Resp 16   Ht 1.708 m (5' 7.25\")   Wt 61.1 kg (134 lb 12.8 oz)   SpO2 96%  Body mass index is 20.96 kg/m².    Hearing good.    Dentition good  Alert, oriented in no acute distress.  Eye contact is good, speech goal directed, affect calm    Assessment and Plan. The following treatment and monitoring plan is recommended:      1. Encounter for Medicare annual wellness exam  2. Encounter for screening for COVID-19  Padmini is a pleasant 71 year old woman here today for her Medicare Wellness Visit. Health maintenance is up to date. She is traveling to the [facility] and would like a COVID-19 test before leaving. In addition, she would like antibiotics in anticipation of a possible traveler's diarrhea infection or UTI, which were provided.  - SARS-CoV-2 PCR (24 hour In-House): Collect NP swab in VTM; Future    3. Stage 3b chronic kidney disease (HCC)  This is a chronic condition, " stable. She follows with [facility] nephrology who is monitoring and managing.    4. Age-related osteoporosis without current pathological fracture  This is a chronic condition, stable. She follows with [facility]ocr[facility]ology who is monitoring and managing. Currently she is on fosamax but endocrinology is considering changing her medication. She will let me know if that occurs.    5. Primary hypertension  This is a chronic condition, controlled.  Blood pressure is at goal under 140/90 in the office today.  We will continue the current regimen.  - atenolol 25 mg daily    6. Neuropathy  This is a chronic condition, stable. 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 It continues to be mainly tolerable so we will monitor for now.    7. Hot flashes  This is a chronic condition, controlled. She will continue the lexapro which she reports is working well for the hot flashes.    8. Other constipation  This is a chronic condition, stable. She will use miralax as needed but has found regular exercise to keep her regular. We will continue to monitor.     9. Chronic pain of right knee  This is a chronic condition, stable. She reports her knee pain continues to be tolerable, so we will monitor for now.      Services suggested: No services needed at this time  Health Care Screening recommendations as per orders if indicated.  Referrals offered: PT/OT/Nutrition counseling/Behavioral Health/Smoking cessation as per orders if indicated.    Discussion today about general wellness and lifestyle habits:    · Prevent falls and reduce trip hazards; Cautioned about securing or removing rugs.  · Have a working fire alarm and carbon monoxide detector;   · Engage in regular physical activity and social activities       Follow-up: Return in about 6 months (around 4/14/2022) for Med check.

## 2021-10-25 ENCOUNTER — HOSPITAL ENCOUNTER (OUTPATIENT)
Dept: RADIOLOGY | Facility: MEDICAL CENTER | Age: 71
End: 2021-10-25
Attending: FAMILY MEDICINE
Payer: MEDICARE

## 2021-10-25 DIAGNOSIS — Z12.31 ENCOUNTER FOR SCREENING MAMMOGRAM FOR MALIGNANT NEOPLASM OF BREAST: ICD-10-CM

## 2021-10-25 PROCEDURE — 76641 ULTRASOUND BREAST COMPLETE: CPT

## 2021-10-25 PROCEDURE — 77063 BREAST TOMOSYNTHESIS BI: CPT

## 2021-10-28 ENCOUNTER — HOSPITAL ENCOUNTER (OUTPATIENT)
Dept: LAB | Facility: MEDICAL CENTER | Age: 71
End: 2021-10-28
Attending: FAMILY MEDICINE
Payer: MEDICARE

## 2021-10-28 DIAGNOSIS — Z11.52 ENCOUNTER FOR SCREENING FOR COVID-19: ICD-10-CM

## 2021-10-28 LAB — COVID ORDER STATUS COVID19: NORMAL

## 2021-10-28 PROCEDURE — C9803 HOPD COVID-19 SPEC COLLECT: HCPCS

## 2021-10-28 PROCEDURE — U0003 INFECTIOUS AGENT DETECTION BY NUCLEIC ACID (DNA OR RNA); SEVERE ACUTE RESPIRATORY SYNDROME CORONAVIRUS 2 (SARS-COV-2) (CORONAVIRUS DISEASE [COVID-19]), AMPLIFIED PROBE TECHNIQUE, MAKING USE OF HIGH THROUGHPUT TECHNOLOGIES AS DESCRIBED BY CMS-2020-01-R: HCPCS

## 2021-10-28 PROCEDURE — U0005 INFEC AGEN DETEC AMPLI PROBE: HCPCS

## 2021-10-29 LAB
SARS-COV-2 RNA RESP QL NAA+PROBE: NOTDETECTED
SPECIMEN SOURCE: NORMAL

## 2021-11-22 ENCOUNTER — HOSPITAL ENCOUNTER (OUTPATIENT)
Dept: LAB | Facility: MEDICAL CENTER | Age: 71
End: 2021-11-22
Attending: FAMILY MEDICINE
Payer: MEDICARE

## 2021-11-22 DIAGNOSIS — Z85.43 HISTORY OF OVARIAN CANCER: ICD-10-CM

## 2021-11-22 LAB — CANCER AG125 SERPL-ACNC: 12.8 U/ML (ref 0–35)

## 2021-11-22 PROCEDURE — 86304 IMMUNOASSAY TUMOR CA 125: CPT

## 2021-11-22 PROCEDURE — 36415 COLL VENOUS BLD VENIPUNCTURE: CPT

## 2021-12-07 ENCOUNTER — OFFICE VISIT (OUTPATIENT)
Dept: DERMATOLOGY | Facility: IMAGING CENTER | Age: 71
End: 2021-12-07
Payer: MEDICARE

## 2021-12-07 DIAGNOSIS — L82.1 SEBORRHEIC KERATOSES: ICD-10-CM

## 2021-12-07 DIAGNOSIS — D22.9 MULTIPLE NEVI: ICD-10-CM

## 2021-12-07 DIAGNOSIS — L81.4 LENTIGINES: ICD-10-CM

## 2021-12-07 DIAGNOSIS — D48.5 NEOPLASM OF UNCERTAIN BEHAVIOR OF SKIN: ICD-10-CM

## 2021-12-07 DIAGNOSIS — D18.01 CHERRY ANGIOMA: ICD-10-CM

## 2021-12-07 DIAGNOSIS — Z12.83 SKIN CANCER SCREENING: ICD-10-CM

## 2021-12-07 PROCEDURE — 11102 TANGNTL BX SKIN SINGLE LES: CPT | Performed by: NURSE PRACTITIONER

## 2021-12-07 PROCEDURE — 99213 OFFICE O/P EST LOW 20 MIN: CPT | Mod: 25 | Performed by: NURSE PRACTITIONER

## 2021-12-07 ASSESSMENT — ENCOUNTER SYMPTOMS
CHILLS: 0
FEVER: 0

## 2021-12-07 NOTE — PROGRESS NOTES
Dermatology Return Patient Visit    Chief Complaint   Patient presents with   • Follow-Up     ROBERT       Subjective:     HPI:   Zena Paz is a 71 y.o. female presenting for    Follow up ROBERT     No growing, changing, bleeding, itching, skin lesions.    History of skin cancer: Yes, Details: possible bcc to right cheek. No excision. approx 2015  History of precancers/actinic keratoses: Yes, Details: yes  History of biopsies:Yes, Details: moles, atypical, DF on arm  History of blistering/severe sunburns:Yes, Details: as a child  Family history of skin cancer:Yes, Details: Father and 3 siblings with melanoma      Past Medical History:   Diagnosis Date   • Backpain     had back surgery, s1-l5 fusion   • Bowel obstruction (HCC)    • Cancer (HCC)     overian cancer   • Heart murmur    • Hypertension    • Indigestion     sometimes   • Other specified symptom associated with female genital organs     had ca ov the ovary   • Schwannoma of cranial nerve (HCC)        Current Outpatient Medications on File Prior to Visit   Medication Sig Dispense Refill   • NON SPECIFIED Hydroeye     • atenolol (TENORMIN) 25 MG Tab TAKE 1 TABLET BY MOUTH EVERY EVENING 90 Tablet 3   • alendronate (FOSAMAX) 70 MG Tab Take 70 mg by mouth every 7 days.     • escitalopram (LEXAPRO) 10 MG Tab TAKE 1 TABLET BY MOUTH EVERY MORNING 90 tablet 3   • fluticasone (FLONASE) 50 MCG/ACT nasal spray Spray 1 Spray in nose every day.     • Ayr Saline Nasal Gel Spray  in nose.     • LORazepam (ATIVAN) 1 MG Tab Take 1 mg by mouth.     • ondansetron (ZOFRAN) 8 MG Tab Take 8 mg by mouth.     • calcium citrate (CALCITRATE) 950 MG Tab Take 950 mg by mouth every day.     • polyethyl glycol-propyl glycol (SYSTANE) 0.4-0.3 % Solution Place 1 Drop in both eyes every morning.     • vitamin D (CHOLECALCIFEROL) 1000 UNIT TABS Take 2,000 Units by mouth every day.     • aspirin 81 MG EC tablet Take 81 mg by mouth every day.     • Multiple Vitamins-Minerals (SENIOR  MULTIVITAMIN PLUS PO) Take 1 Tab by mouth every morning.     • docosahexanoic acid (OMEGA 3 FA) 1000 MG CAPS Take 1,000 mg by mouth every morning.       No current facility-administered medications on file prior to visit.       Allergies   Allergen Reactions   • Latex Rash     Rash     • Losartan      palpitations   • Tetracycline      Sensitivity to sun; sun burns.  Reaction date; 15 years ago from 2013.       Family History   Problem Relation Age of Onset   • Heart Disease Mother    • Stroke Father        Social History     Socioeconomic History   • Marital status:      Spouse name: Not on file   • Number of children: Not on file   • Years of education: Not on file   • Highest education level: Master's degree (e.g., MA, MS, Vikram, MEd, MSW, ANA)   Occupational History   • Not on file   Tobacco Use   • Smoking status: Never Smoker   • Smokeless tobacco: Never Used   Vaping Use   • Vaping Use: Never used   Substance and Sexual Activity   • Alcohol use: Yes     Comment: 2 drinks per week on average    • Drug use: No   • Sexual activity: Yes     Partners: Male     Birth control/protection: Post-Menopausal   Other Topics Concern   •  Service Not Asked   • Blood Transfusions Not Asked   • Caffeine Concern Not Asked   • Occupational Exposure Not Asked   • Hobby Hazards Not Asked   • Sleep Concern No   • Stress Concern Not Asked   • Weight Concern No   • Special Diet Not Asked   • Back Care Not Asked   • Exercise Yes   • Bike Helmet Not Asked   • Seat Belt Not Asked   • Self-Exams Not Asked   Social History Narrative   • Not on file     Social Determinants of Health     Financial Resource Strain: Low Risk    • Difficulty of Paying Living Expenses: Not hard at all   Food Insecurity: No Food Insecurity   • Worried About Running Out of Food in the Last Year: Never true   • Ran Out of Food in the Last Year: Never true   Transportation Needs: No Transportation Needs   • Lack of Transportation (Medical): No   •  Lack of Transportation (Non-Medical): No   Physical Activity: Sufficiently Active   • Days of Exercise per Week: 7 days   • Minutes of Exercise per Session: 50 min   Stress: No Stress Concern Present   • Feeling of Stress : Only a little   Social Connections: Moderately Integrated   • Frequency of Communication with Friends and Family: Twice a week   • Frequency of Social Gatherings with Friends and Family: Three times a week   • Attends Hindu Services: Never   • Active Member of Clubs or Organizations: Yes   • Attends Club or Organization Meetings: More than 4 times per year   • Marital Status:    Intimate Partner Violence:    • Fear of Current or Ex-Partner: Not on file   • Emotionally Abused: Not on file   • Physically Abused: Not on file   • Sexually Abused: Not on file   Housing Stability: Low Risk    • Unable to Pay for Housing in the Last Year: No   • Number of Places Lived in the Last Year: 1   • Unstable Housing in the Last Year: No       Review of Systems   Constitutional: Negative for chills and fever.   Skin: Negative for itching and rash.        Objective:     A full mucocutaneous exam was completed including: scalp, hair, ears, face, eyelids, conjunctiva, lips, gums/tongue/oropharynx, neck, chest breasts, abdomen, back, left and right upper extremities (including hands/digits and fingernails), left and right lower extremities (including feet/toes, toenails), buttocks, excluding external genitalia (patient refusal) with the following pertinent findings listed below. Remaining above-listed examined areas within normal limits / negative for rashes or lesions.    There were no vitals taken for this visit.    Physical Exam  Vitals reviewed.   Constitutional:       General: She is not in acute distress.  HENT:      Head: Normocephalic.        Comments: -face with scattered clinically benign light brown reticulated macules all of which were morphologically similar and none of which were suspicious  for skin cancer today on exam       Right Ear: External ear normal.      Left Ear: External ear normal.   Eyes:      Conjunctiva/sclera: Conjunctivae normal.   Pulmonary:      Effort: Pulmonary effort is normal.   Musculoskeletal:      Cervical back: Neck supple.   Skin:     General: Skin is warm and dry.      Findings: No erythema or rash.      Comments: Multiple medium brown macules and flesh colored papules scattered over the trunk. All with benign-appearing pigment network patterns on dermoscopy    5mm flesh colored papule to left central abd    Several scattered 1-3mm bright red macules and thin papules on the trunk.     Several scattered medium brown macules over chest, forearms and shins. Clinically benign     Several light brown medium brown stuck-on waxy papules scattered on the trunk and extremities    Flesh colored papule to gluteal cleft. No concerning features under dermoscopy     Neurological:      Mental Status: She is alert and oriented to person, place, and time.   Psychiatric:         Mood and Affect: Mood and affect normal.         DATA: none applicable to review    Assessment and Plan:       1. Neoplasm of uncertain behavior of skin  Procedure Note   Procedure: Biopsy by shave technique  Location: left central abd  Size: as noted in exam  Preoperative diagnosis:nevus r/o atypia  Risks, benefits and alternatives of procedure discussed and written informed consent obtained for procedure and verbal consent for photos. Time out completed. Area of biopsy prepped with alcohol. Anesthesia with 1% lidocaine with epinephrine administered with 30 gauge needle. Shave biopsy of the site performed. Hemostasis achieved with pressure and aluminum chloride. Vaseline applied to wound with bandage. Patient tolerated procedure well and there were no complications. The specimen was sent to the pathology lab by the staff. Wound care was discussed.      2. Multiple nevi  - Benign-appearing nature of lesions discussed.  Advised to return to clinic for any new or concerning changes.  - ABCDE's of melanoma discussed      3. Lentigines  - Discussed benign nature of lesions, related to sun exposure  - Discussed sun protection, hand out provided      4. Seborrheic keratoses  - Benign-appearing nature of lesions discussed. Advised to return to clinic for any new or concerning changes.      5. Cherry angioma  - Benign-appearing nature of lesions discussed. Advised to return to clinic for any new or concerning changes.      6. Skin cancer screening  Skin cancer education  - discussed importance of sun protective clothing, eyewear  - discussed importance of daily use of broad spectrum sunscreen with SPF 30 or greater, as well as need for reapplication ~every 2 hours when exposed to UVR  - discussed importance of regular self-exams, ideally once per month, every 12 months exams in clinic  - ABCDE's of melanoma discussed  - patient to bring any new or concerning lesions to my attention  - Patient educational handout provided and reviewed with patient    I have performed a physical exam and reviewed and updated ROS and Plan today (12/7/2021). In review of dermatology visit (12/7/2020), there are no changes except as documented above.         Followup: Return in about 1 year (around 12/7/2022) for ROBERT.    MAKAYLA Calhoun.

## 2021-12-13 ENCOUNTER — TELEPHONE (OUTPATIENT)
Dept: DERMATOLOGY | Facility: IMAGING CENTER | Age: 71
End: 2021-12-13

## 2021-12-13 NOTE — TELEPHONE ENCOUNTER
Left message informing patient of D- path results benign . No further treatment is needed , scanned in media tab .

## 2021-12-27 RX ORDER — ESCITALOPRAM OXALATE 10 MG/1
TABLET ORAL
Qty: 90 TABLET | Refills: 3 | Status: SHIPPED | OUTPATIENT
Start: 2021-12-27 | End: 2022-12-30 | Stop reason: SDUPTHER

## 2022-01-21 ENCOUNTER — PHARMACY VISIT (OUTPATIENT)
Dept: PHARMACY | Facility: MEDICAL CENTER | Age: 72
End: 2022-01-21
Payer: COMMERCIAL

## 2022-01-21 PROCEDURE — RXMED WILLOW AMBULATORY MEDICATION CHARGE: Performed by: INTERNAL MEDICINE

## 2022-01-21 RX ORDER — RNA INGREDIENT BNT-162B2 0.23 G/1.8ML
0.3 INJECTION, SUSPENSION INTRAMUSCULAR
Qty: 0.3 ML | Refills: 0 | Status: SHIPPED | OUTPATIENT
Start: 2022-01-21 | End: 2022-02-09

## 2022-01-31 ENCOUNTER — HOSPITAL ENCOUNTER (OUTPATIENT)
Dept: LAB | Facility: MEDICAL CENTER | Age: 72
End: 2022-01-31
Attending: FAMILY MEDICINE
Payer: MEDICARE

## 2022-01-31 DIAGNOSIS — Z85.43 HISTORY OF OVARIAN CANCER: ICD-10-CM

## 2022-01-31 LAB — CANCER AG125 SERPL-ACNC: 15.1 U/ML (ref 0–35)

## 2022-01-31 PROCEDURE — 86304 IMMUNOASSAY TUMOR CA 125: CPT

## 2022-01-31 PROCEDURE — 36415 COLL VENOUS BLD VENIPUNCTURE: CPT

## 2022-02-07 ENCOUNTER — TELEPHONE (OUTPATIENT)
Dept: MEDICAL GROUP | Facility: PHYSICIAN GROUP | Age: 72
End: 2022-02-07

## 2022-02-09 ENCOUNTER — OUTPATIENT INFUSION SERVICES (OUTPATIENT)
Dept: ONCOLOGY | Facility: MEDICAL CENTER | Age: 72
End: 2022-02-09
Attending: FAMILY MEDICINE
Payer: MEDICARE

## 2022-02-09 VITALS
HEART RATE: 60 BPM | DIASTOLIC BLOOD PRESSURE: 72 MMHG | HEIGHT: 66 IN | WEIGHT: 139.99 LBS | BODY MASS INDEX: 22.5 KG/M2 | TEMPERATURE: 97 F | RESPIRATION RATE: 18 BRPM | OXYGEN SATURATION: 98 % | SYSTOLIC BLOOD PRESSURE: 121 MMHG

## 2022-02-09 DIAGNOSIS — M81.0 AGE-RELATED OSTEOPOROSIS WITHOUT CURRENT PATHOLOGICAL FRACTURE: ICD-10-CM

## 2022-02-09 LAB
CA-I BLD ISE-SCNC: 1.28 MMOL/L (ref 1.1–1.3)
CREAT BLD-MCNC: 1.2 MG/DL (ref 0.5–1.4)

## 2022-02-09 PROCEDURE — 82565 ASSAY OF CREATININE: CPT

## 2022-02-09 PROCEDURE — 36415 COLL VENOUS BLD VENIPUNCTURE: CPT

## 2022-02-09 PROCEDURE — 82330 ASSAY OF CALCIUM: CPT

## 2022-02-09 PROCEDURE — 700111 HCHG RX REV CODE 636 W/ 250 OVERRIDE (IP): Mod: JG | Performed by: FAMILY MEDICINE

## 2022-02-09 PROCEDURE — 96372 THER/PROPH/DIAG INJ SC/IM: CPT

## 2022-02-09 RX ORDER — DIPHENHYDRAMINE HYDROCHLORIDE 50 MG/ML
50 INJECTION INTRAMUSCULAR; INTRAVENOUS PRN
Status: CANCELLED | OUTPATIENT
Start: 2022-08-08

## 2022-02-09 RX ORDER — METHYLPREDNISOLONE SODIUM SUCCINATE 125 MG/2ML
125 INJECTION, POWDER, LYOPHILIZED, FOR SOLUTION INTRAMUSCULAR; INTRAVENOUS PRN
Status: CANCELLED | OUTPATIENT
Start: 2022-08-08

## 2022-02-09 RX ORDER — EPINEPHRINE 1 MG/ML(1)
0.5 AMPUL (ML) INJECTION PRN
Status: CANCELLED | OUTPATIENT
Start: 2022-08-08

## 2022-02-09 RX ADMIN — DENOSUMAB 60 MG: 60 INJECTION SUBCUTANEOUS at 15:29

## 2022-02-10 NOTE — PROGRESS NOTES
Patient presented to [facility] for Prolia injection. She denied planned/recent oral surgery, current illness/infection or s/sx of hypocalcemia. Venipuncture right AC with 25g butterfly.  Istat ionized calcium and creatinine drawn. Gauze and coban dressing to venipuncture site. Ionized calcium within treatable parameters. Patient received Prolia SQ in right upper arm. Tolerated well. Next appointment scheduled, and patient was made aware of appointment time. She left [facility] in no apparent distress.

## 2022-02-14 ENCOUNTER — OFFICE VISIT (OUTPATIENT)
Dept: MEDICAL GROUP | Facility: PHYSICIAN GROUP | Age: 72
End: 2022-02-14
Payer: MEDICARE

## 2022-02-14 VITALS
HEART RATE: 62 BPM | SYSTOLIC BLOOD PRESSURE: 112 MMHG | BODY MASS INDEX: 21.82 KG/M2 | RESPIRATION RATE: 16 BRPM | TEMPERATURE: 98.6 F | OXYGEN SATURATION: 96 % | WEIGHT: 139 LBS | DIASTOLIC BLOOD PRESSURE: 60 MMHG | HEIGHT: 67 IN

## 2022-02-14 DIAGNOSIS — M89.8X1 CLAVICLE PAIN: ICD-10-CM

## 2022-02-14 PROBLEM — R52 PAIN: Status: ACTIVE | Noted: 2022-02-14

## 2022-02-14 PROCEDURE — 99213 OFFICE O/P EST LOW 20 MIN: CPT | Performed by: FAMILY MEDICINE

## 2022-02-14 ASSESSMENT — PATIENT HEALTH QUESTIONNAIRE - PHQ9: CLINICAL INTERPRETATION OF PHQ2 SCORE: 0

## 2022-02-14 ASSESSMENT — ENCOUNTER SYMPTOMS
FEVER: 0
SHORTNESS OF BREATH: 0
CHILLS: 0

## 2022-02-14 NOTE — PROGRESS NOTES
"Subjective:     CC: collarbone pain    HPI:   Zena presents today with    Problem   Clavicle Pain    Onset: 1 month  Location: left collarbone  Quality: dull ache  Duration: intermittent but daily  Radiation: into neck, once  Associated symptoms:  Triggers: extending the arm  Home treatments: tylenol - helps         Health Maintenance: Completed    ROS:  Review of Systems   Constitutional: Negative for chills and fever.   Respiratory: Negative for shortness of breath.    Cardiovascular: Negative for chest pain.       Objective:     Exam:  /60 (BP Location: Left arm, Patient Position: Sitting, BP Cuff Size: Adult)   Pulse 62   Temp 37 °C (98.6 °F) (Temporal)   Resp 16   Ht 1.702 m (5' 7\")   Wt 63 kg (139 lb)   SpO2 96%   BMI 21.77 kg/m²  Body mass index is 21.77 kg/m².    Physical Exam  Constitutional:       Appearance: Normal appearance.   Cardiovascular:      Rate and Rhythm: Normal rate and regular rhythm.      Heart sounds: Normal heart sounds.   Pulmonary:      Effort: Pulmonary effort is normal.      Breath sounds: Normal breath sounds.   Musculoskeletal:        Arms:       Cervical back: Normal range of motion and neck supple.   Neurological:      Mental Status: She is alert.         Assessment & Plan:     71 y.o. female with the following -     Problem List Items Addressed This Visit     Clavicle pain     Acute.  For about 4 weeks she has had pain along the left collarbone.  She is tender to palpation along the inferior aspect of the left collarbone where the anterior deltoid and pectoralis major muscles insert.  She also describes pain with flexion of that shoulder up to horizontal which involves those muscles.  Most likely this is a muscle strain and reassurance was provided today.  -Conservative management: Over-the-counter medications like Tylenol or ibuprofen as needed for pain, ice, heat  -If pain is unresolved by the time she returns from her trip, we will get an x-ray to evaluate " further             Return if symptoms worsen or fail to improve.    Please note that this dictation was created using voice recognition software. I have made every reasonable attempt to correct obvious errors, but I expect that there are errors of grammar and possibly content that I did not discover before finalizing the note.

## 2022-02-14 NOTE — ASSESSMENT & PLAN NOTE
Acute.  For about 4 weeks she has had pain along the left collarbone.  She is tender to palpation along the inferior aspect of the left collarbone where the anterior deltoid and pectoralis major muscles insert.  She also describes pain with flexion of that shoulder up to horizontal which involves those muscles.  Most likely this is a muscle strain and reassurance was provided today.  -Conservative management: Over-the-counter medications like Tylenol or ibuprofen as needed for pain, ice, heat  -If pain is unresolved by the time she returns from her trip, we will get an x-ray to evaluate further

## 2022-02-14 NOTE — LETTER
[facility]  Martina Grajeda M.D.  [address]  [place] [address]  Fax: [phone]   Authorization for Release/Disclosure of   Protected Health Information   Name: SHIVANI DARLING : 1950 SSN: [id number]   Address: [address] [place] [address] [place] [address] Phone:    [phone] (home)    I authorize the entity listed below to release/disclose the PHI below to:   [facility]/Martina Grajeda M.D. and Martina Grajeda M.D.   Provider or Entity Name:  [facility]   Address   City, State, [place]   Phone:      Fax:     Reason for request: continuity of care   Information to be released:    [  ] LAST COLONOSCOPY,  including any PATH REPORT and follow-up  [  ] LAST FIT/COLOGUARD RESULT [  ] LAST DEXA  [  ] LAST MAMMOGRAM  [  ] LAST PAP  [  ] LAST LABS [  ] RETINA EXAM REPORT  [  ] IMMUNIZATION RECORDS  [XX] Release all info      [  ] Check here and initial the line next to each item to release ALL health information INCLUDING  _____ Care and treatment for drug and / or alcohol abuse  _____ HIV testing, infection status, or AIDS  _____ Genetic Testing    DATES OF SERVICE OR TIME PERIOD TO BE DISCLOSED: _____________  I understand and acknowledge that:  * This Authorization may be revoked at any time by you in writing, except if your health information has already been used or disclosed.  * Your health information that will be used or disclosed as a result of you signing this authorization could be re-disclosed by the recipient. If this occurs, your re-disclosed health information may no longer be protected by State or Federal laws.  * You may refuse to sign this Authorization. Your refusal will not affect your ability to obtain treatment.  * This Authorization becomes effective upon signing and will  on (date) __________.      If no date is indicated, this Authorization will  one (1) year from the signature date.    Name: Shivani Darling    Signature:   Date:      2/14/2022       PLEASE FAX REQUESTED RECORDS BACK TO: [phone]

## 2022-02-26 ENCOUNTER — TELEPHONE (OUTPATIENT)
Dept: MEDICAL GROUP | Facility: MEDICAL CENTER | Age: 72
End: 2022-02-26
Payer: MEDICARE

## 2022-02-28 ENCOUNTER — OFFICE VISIT (OUTPATIENT)
Dept: MEDICAL GROUP | Facility: PHYSICIAN GROUP | Age: 72
End: 2022-02-28
Payer: MEDICARE

## 2022-02-28 VITALS
WEIGHT: 138.4 LBS | DIASTOLIC BLOOD PRESSURE: 60 MMHG | TEMPERATURE: 97 F | HEIGHT: 67 IN | BODY MASS INDEX: 21.72 KG/M2 | HEART RATE: 71 BPM | SYSTOLIC BLOOD PRESSURE: 100 MMHG | OXYGEN SATURATION: 99 % | RESPIRATION RATE: 18 BRPM

## 2022-02-28 DIAGNOSIS — I49.1 PAC (PREMATURE ATRIAL CONTRACTION): ICD-10-CM

## 2022-02-28 DIAGNOSIS — R19.7 DIARRHEA, UNSPECIFIED TYPE: ICD-10-CM

## 2022-02-28 DIAGNOSIS — I49.9 IRREGULAR HEART BEAT: ICD-10-CM

## 2022-02-28 DIAGNOSIS — I10 PRIMARY HYPERTENSION: ICD-10-CM

## 2022-02-28 PROCEDURE — 99214 OFFICE O/P EST MOD 30 MIN: CPT | Performed by: STUDENT IN AN ORGANIZED HEALTH CARE EDUCATION/TRAINING PROGRAM

## 2022-02-28 NOTE — PROGRESS NOTES
CC:  Diagnoses of Primary hypertension, Diarrhea, unspecified type, Irregular heart beat, and PAC (premature atrial contraction) were pertinent to this visit.    HISTORY OF THE PRESENT ILLNESS: Patient is a 71 y.o. female. This pleasant patient is here today to discuss multiple issues. Her PCP is Dr. Martina Grajeda MD.    1.  Hypertension  Patient presents today concerned about hypertension.  She has a blood pressure log in hand with only 1 systolic over 140 (145).  She continues to take her atenolol 25 mg nightly.    2.  Irregular heartbeat/palpitations  Patient claims a remote history of atrial fibrillation that has resolved.  On 2/18/2022 she began to experience some palpitations and an irregular heartbeat.  She has not been short of breath.  Her blood pressure has remained in the normal range.  No swelling of the ankles or orthopnea reported.  She continues to take atenolol 25 mg nightly and is currently on aspirin 81 mg daily.    3.  Diarrhea  The patient suffers from baseline chronic diarrhea and occasional constipation.  She developed unusually bad diarrhea on 2/21/2022.  She continued to use MiraLAX during this bout of diarrhea.  This has been resolved for 3 days.         No problem-specific Assessment & Plan notes found for this encounter.      Current Outpatient Medications Ordered in Epic   Medication Sig Dispense Refill   • escitalopram (LEXAPRO) 10 MG Tab TAKE 1 TABLET BY MOUTH EVERY MORNING 90 Tablet 3   • NON SPECIFIED Hydroeye     • atenolol (TENORMIN) 25 MG Tab TAKE 1 TABLET BY MOUTH EVERY EVENING 90 Tablet 3   • fluticasone (FLONASE) 50 MCG/ACT nasal spray Spray 1 Spray in nose every day.     • Ayr Saline Nasal Gel Spray  in nose.     • LORazepam (ATIVAN) 1 MG Tab Take 1 mg by mouth.     • ondansetron (ZOFRAN) 8 MG Tab Take 8 mg by mouth.     • calcium citrate (CALCITRATE) 950 MG Tab Take 950 mg by mouth every day.     • polyethyl glycol-propyl glycol (SYSTANE) 0.4-0.3 % Solution Place 1 Drop in  "both eyes every morning.     • vitamin D (CHOLECALCIFEROL) 1000 UNIT TABS Take 2,000 Units by mouth every day.     • aspirin 81 MG EC tablet Take 81 mg by mouth every day.     • Multiple Vitamins-Minerals (SENIOR MULTIVITAMIN PLUS PO) Take 1 Tab by mouth every morning.     • docosahexanoic acid (OMEGA 3 FA) 1000 MG CAPS Take 1,000 mg by mouth every morning.       No current [facility]-ordered facility-administered medications on file.     ROS:   Gen: no fevers/chills, unplanned changes in weight  Eyes: no changes in vision  ENT: no sore throat, no hearing loss, no bloody nose  Pulm: no sob, no cough  CV: See HPI  GI: See HPI  : no dysuria/nocturia > once per night  MSk: no myalgias  Skin: no rash  Neuro: no headaches, no numbness/tingling  Heme/Lymph: no easy bruising      Objective:     Exam: /60 (BP Location: Left arm, Patient Position: Sitting, BP Cuff Size: Adult)   Pulse 71   Temp 36.1 °C (97 °F) (Temporal)   Resp 18   Ht 1.702 m (5' 7\")   Wt 62.8 kg (138 lb 6.4 oz)   SpO2 99%  Body mass index is 21.68 kg/m².    General: Normal appearing. No distress.  HEENT: Normocephalic. Eyes conjunctiva clear lids without ptosis, pupils equal and reactive to light accommodation, ears normal shape and contour, canals are clear bilaterally, tympanic membranes are benign, nasal mucosa benign, oropharynx is without erythema, edema or exudates.   Neck: Supple without JVD. Thyroid is not enlarged.  Pulmonary: Clear to ausculation.  Normal effort. No rales, ronchi, or wheezing.  Cardiovascular: Irregular rate and rhythm without murmur. Radial pulses are intact and equal bilaterally.  Abdomen: Soft, nontender, nondistended. Normal bowel sounds. Liver and spleen are not palpable  Neurologic: Grossly nonfocal  Lymph: No cervical or supraclavicular lymph nodes are palpable  Skin: Warm and dry.  No obvious lesions.  Musculoskeletal: Normal gait. No extremity cyanosis, clubbing, or edema.  Psych: Normal mood and affect. Alert " and oriented x3. Judgment and insight is normal.    A chaperone was offered to the patient during today's exam. Patient declined chaperone.    Labs:   2/28/2022:  -EKG, sinus rhythm, rate of 81 with atrial premature complexes.    8/10/2021:  -CBC    Assessment & Plan:   71 y.o. female with the following -    1. Primary hypertension  -Chronic, stable.  -Continue atenolol 25 mg daily.    2. Diarrhea, unspecified type  - Acute, resolving.  - This is most likely due to her use of MiraLAX.  I have instructed the patient to discontinue MiraLAX temporarily if she should have diarrhea in the future.    3. Irregular heart beat/occasional premature atrial complexes.  - Chronic, stable.  - Refer to cardiology.  - CBC, CMP, TSH with reflex T4.  - EKG - Clinic Performed    Return if symptoms worsen or fail to improve.    Please note that this dictation was created using voice recognition software. I have made every reasonable attempt to correct obvious errors, but I expect that there are errors of grammar and possibly content that I did not discover before finalizing the note.    Jose Tapia PA-C 2/28/2022

## 2022-03-09 ENCOUNTER — OFFICE VISIT (OUTPATIENT)
Dept: MEDICAL GROUP | Facility: PHYSICIAN GROUP | Age: 72
End: 2022-03-09
Payer: MEDICARE

## 2022-03-09 VITALS
RESPIRATION RATE: 16 BRPM | HEART RATE: 68 BPM | TEMPERATURE: 97.3 F | HEIGHT: 67 IN | DIASTOLIC BLOOD PRESSURE: 68 MMHG | SYSTOLIC BLOOD PRESSURE: 108 MMHG | BODY MASS INDEX: 21.5 KG/M2 | OXYGEN SATURATION: 98 % | WEIGHT: 137 LBS

## 2022-03-09 DIAGNOSIS — N89.8 VAGINAL LESION: ICD-10-CM

## 2022-03-09 DIAGNOSIS — I49.1 PAC (PREMATURE ATRIAL CONTRACTION): Primary | ICD-10-CM

## 2022-03-09 PROCEDURE — 99213 OFFICE O/P EST LOW 20 MIN: CPT | Performed by: FAMILY MEDICINE

## 2022-03-09 ASSESSMENT — ENCOUNTER SYMPTOMS
SHORTNESS OF BREATH: 0
CHILLS: 0
FEVER: 0

## 2022-03-09 NOTE — ASSESSMENT & PLAN NOTE
Acute, improving.  She reports that she developed significant heart palpitations that are affecting sleep on February 18 after her  tested positive for COVID-19 but she did not.  She did see one of her providers in the office and EKG showed PACs at that time.  She is report that it is improving.  Therefore, I do not think a 2-week heart monitor would be helpful at this time.  I recommended to follow-up with the cardiology referral.  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

## 2022-03-09 NOTE — ASSESSMENT & PLAN NOTE
At the end of her appointment she thought she may have seen a spot with a mirror near the urethra.  On exam she appears completely normal so I provided reassurance today.

## 2022-03-09 NOTE — PROGRESS NOTES
"Subjective:     CC: f/u heart    HPI:   Zena presents today with    Problem   Vaginal Lesion   Pac (Premature Atrial Contraction)    Started Feb. 18 after +Covid test  Palpitations and felt irregular... caused loss of sleep.  Started hiking last week and now it feels more normal.   Started electrolyte tablets and feels like it helped.          Health Maintenance: Completed    ROS:  Review of Systems   Constitutional: Negative for chills and fever.   Respiratory: Negative for shortness of breath.    Cardiovascular: Negative for chest pain.       Objective:     Exam:  /68 (BP Location: Right arm, Patient Position: Sitting, BP Cuff Size: Adult)   Pulse 68   Temp 36.3 °C (97.3 °F) (Temporal)   Resp 16   Ht 1.702 m (5' 7\")   Wt 62.1 kg (137 lb)   SpO2 98%   BMI 21.46 kg/m²  Body mass index is 21.46 kg/m².    Physical Exam  Constitutional:       Appearance: Normal appearance.   Cardiovascular:      Rate and Rhythm: Normal rate and regular rhythm.      Heart sounds: Normal heart sounds.   Pulmonary:      Effort: Pulmonary effort is normal.      Breath sounds: Normal breath sounds.   Genitourinary:     General: Normal vulva.      Exam position: Lithotomy position.      Pubic Area: No rash or pubic lice.       Labia:         Right: No rash, tenderness or lesion.         Left: No rash, tenderness or lesion.    Musculoskeletal:      Cervical back: Normal range of motion and neck supple.   Neurological:      Mental Status: She is alert.       Assessment & Plan:     71 y.o. female with the following -     Problem List Items Addressed This Visit     PAC (premature atrial contraction)     Acute, improving.  She reports that she developed significant heart palpitations that are affecting sleep on February 18 after her  tested positive for COVID-19 but she did not.  She did see one of her providers in the office and EKG showed PACs at that time.  She is report that it is improving.  Therefore, I do not think a " 2-week heart monitor would be helpful at this time.  I recommended to follow-up with the cardiology referral.  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         Vaginal lesion     At the end of her appointment she thought she may have seen a spot with a mirror near the urethra.  On exam she appears completely normal so I provided reassurance today.             Return if symptoms worsen or fail to improve.    Please note that this dictation was created using voice recognition software. I have made every reasonable attempt to correct obvious errors, but I expect that there are errors of grammar and possibly content that I did not discover before finalizing the note.

## 2022-03-09 NOTE — PATIENT INSTRUCTIONS
[facility]  [address], [address]  [place] [address]  Phone: [phone]  Fax: [phone]

## 2022-03-11 ENCOUNTER — HOSPITAL ENCOUNTER (OUTPATIENT)
Dept: LAB | Facility: MEDICAL CENTER | Age: 72
End: 2022-03-11
Attending: STUDENT IN AN ORGANIZED HEALTH CARE EDUCATION/TRAINING PROGRAM
Payer: MEDICARE

## 2022-03-11 ENCOUNTER — TELEPHONE (OUTPATIENT)
Dept: CARDIOLOGY | Facility: MEDICAL CENTER | Age: 72
End: 2022-03-11

## 2022-03-11 DIAGNOSIS — I49.9 IRREGULAR HEART BEAT: ICD-10-CM

## 2022-03-11 DIAGNOSIS — I49.1 PAC (PREMATURE ATRIAL CONTRACTION): ICD-10-CM

## 2022-03-11 DIAGNOSIS — R19.7 DIARRHEA, UNSPECIFIED TYPE: ICD-10-CM

## 2022-03-11 LAB
ALBUMIN SERPL BCP-MCNC: 4.3 G/DL (ref 3.2–4.9)
ALBUMIN/GLOB SERPL: 2.3 G/DL
ALP SERPL-CCNC: 68 U/L (ref 30–99)
ALT SERPL-CCNC: 19 U/L (ref 2–50)
ANION GAP SERPL CALC-SCNC: 10 MMOL/L (ref 7–16)
AST SERPL-CCNC: 26 U/L (ref 12–45)
BILIRUB SERPL-MCNC: 0.5 MG/DL (ref 0.1–1.5)
BUN SERPL-MCNC: 24 MG/DL (ref 8–22)
CALCIUM SERPL-MCNC: 9.1 MG/DL (ref 8.5–10.5)
CHLORIDE SERPL-SCNC: 103 MMOL/L (ref 96–112)
CO2 SERPL-SCNC: 26 MMOL/L (ref 20–33)
CREAT SERPL-MCNC: 1.12 MG/DL (ref 0.5–1.4)
ERYTHROCYTE [DISTWIDTH] IN BLOOD BY AUTOMATED COUNT: 46.8 FL (ref 35.9–50)
GLOBULIN SER CALC-MCNC: 1.9 G/DL (ref 1.9–3.5)
GLUCOSE SERPL-MCNC: 98 MG/DL (ref 65–99)
HCT VFR BLD AUTO: 43.8 % (ref 37–47)
HGB BLD-MCNC: 14.3 G/DL (ref 12–16)
MCH RBC QN AUTO: 29.4 PG (ref 27–33)
MCHC RBC AUTO-ENTMCNC: 32.6 G/DL (ref 33.6–35)
MCV RBC AUTO: 89.9 FL (ref 81.4–97.8)
PLATELET # BLD AUTO: 328 K/UL (ref 164–446)
PMV BLD AUTO: 10.4 FL (ref 9–12.9)
POTASSIUM SERPL-SCNC: 5.3 MMOL/L (ref 3.6–5.5)
PROT SERPL-MCNC: 6.2 G/DL (ref 6–8.2)
RBC # BLD AUTO: 4.87 M/UL (ref 4.2–5.4)
SODIUM SERPL-SCNC: 139 MMOL/L (ref 135–145)
TSH SERPL DL<=0.005 MIU/L-ACNC: 1.43 UIU/ML (ref 0.38–5.33)
WBC # BLD AUTO: 3.5 K/UL (ref 4.8–10.8)

## 2022-03-11 PROCEDURE — 80053 COMPREHEN METABOLIC PANEL: CPT

## 2022-03-11 PROCEDURE — 84443 ASSAY THYROID STIM HORMONE: CPT

## 2022-03-11 PROCEDURE — 85027 COMPLETE CBC AUTOMATED: CPT

## 2022-03-11 PROCEDURE — 36415 COLL VENOUS BLD VENIPUNCTURE: CPT

## 2022-03-11 NOTE — TELEPHONE ENCOUNTER
Medical records have been requested from [facility] in regards to recent ECG tracings, OV notes, imaging.testing cardiac related.   Fax: [phone], Phone: [phone].    Fax confirmation has been received and sent to scan through [employer].

## 2022-03-21 ENCOUNTER — HOSPITAL ENCOUNTER (OUTPATIENT)
Dept: LAB | Facility: MEDICAL CENTER | Age: 72
End: 2022-03-21
Attending: FAMILY MEDICINE
Payer: MEDICARE

## 2022-03-21 DIAGNOSIS — Z20.822 CLOSE EXPOSURE TO COVID-19 VIRUS: ICD-10-CM

## 2022-03-21 LAB — COVID ORDER STATUS COVID19: NORMAL

## 2022-03-21 PROCEDURE — C9803 HOPD COVID-19 SPEC COLLECT: HCPCS

## 2022-03-21 PROCEDURE — U0005 INFEC AGEN DETEC AMPLI PROBE: HCPCS

## 2022-03-21 PROCEDURE — U0003 INFECTIOUS AGENT DETECTION BY NUCLEIC ACID (DNA OR RNA); SEVERE ACUTE RESPIRATORY SYNDROME CORONAVIRUS 2 (SARS-COV-2) (CORONAVIRUS DISEASE [COVID-19]), AMPLIFIED PROBE TECHNIQUE, MAKING USE OF HIGH THROUGHPUT TECHNOLOGIES AS DESCRIBED BY CMS-2020-01-R: HCPCS

## 2022-03-22 LAB
SARS-COV-2 RNA RESP QL NAA+PROBE: NOTDETECTED
SPECIMEN SOURCE: NORMAL

## 2022-03-30 RX ORDER — ATENOLOL 25 MG/1
TABLET ORAL
Qty: 90 TABLET | Refills: 3 | Status: SHIPPED | OUTPATIENT
Start: 2022-03-30 | End: 2022-12-30 | Stop reason: SDUPTHER

## 2022-04-21 NOTE — TELEPHONE ENCOUNTER
After hours phone call  Call on 2/26/2022 at 6:40 PM from Zena Paz [phone] (home)  who reports PCP: Martina Grajeda M.D.    Pt reports: Elevated blood pressure and pulse.  States her heart rate has been having episodes irregular beats since Fridday, but she had not checked her BP until tonight.  Her blood pressure tonight was /90 with HR of 103-150 despite her atenolol 25mg daily.  States she has hx of HTN and hx of irregular heart rate but no hx of afib. Of note, she has family hx of afib. No associated chest pain, SOB.  She had a mild headache this AM upon awakening and was not feeling well this afternoon while reading.  States pulse is not steady currently.    Plan: Given her unsteady heart rate and pulse readings up to 150, recommend ER now for further evaluation and testing given concern she may be in new onset afib with RVR.  Recommended she follow-up with her PCP next week, as well, in case further outpatient coordination is needed.    Annetta Allen D.O.       36.5

## 2022-04-22 ENCOUNTER — OFFICE VISIT (OUTPATIENT)
Dept: MEDICAL GROUP | Facility: PHYSICIAN GROUP | Age: 72
End: 2022-04-22
Payer: MEDICARE

## 2022-04-22 VITALS
WEIGHT: 135 LBS | HEART RATE: 64 BPM | HEIGHT: 67 IN | SYSTOLIC BLOOD PRESSURE: 112 MMHG | DIASTOLIC BLOOD PRESSURE: 70 MMHG | BODY MASS INDEX: 21.19 KG/M2 | OXYGEN SATURATION: 95 % | TEMPERATURE: 97.3 F

## 2022-04-22 DIAGNOSIS — I10 PRIMARY HYPERTENSION: Primary | ICD-10-CM

## 2022-04-22 DIAGNOSIS — N39.46 MIXED STRESS AND URGE URINARY INCONTINENCE: ICD-10-CM

## 2022-04-22 DIAGNOSIS — K59.1 FUNCTIONAL DIARRHEA: ICD-10-CM

## 2022-04-22 PROBLEM — R32 URINARY INCONTINENCE: Status: ACTIVE | Noted: 2022-04-22

## 2022-04-22 PROBLEM — R19.7 DIARRHEA: Status: ACTIVE | Noted: 2020-11-10

## 2022-04-22 PROCEDURE — 99214 OFFICE O/P EST MOD 30 MIN: CPT | Performed by: FAMILY MEDICINE

## 2022-04-22 ASSESSMENT — FIBROSIS 4 INDEX: FIB4 SCORE: 1.27

## 2022-04-22 NOTE — PROGRESS NOTES
"Subjective:     CC: diarrhea    HPI:   Zena presents today with    Problem   Urinary Incontinence    Years, worse in last couple of years  +stress symptoms  +urge symptoms  Notices it the most when she hikes downhill     Diarrhea    Chronic. Reports not having any constipation any more. She is struggling with watery diarrhea after eating meals. She does swing back and forth between constipation and diarrhea. Last month has been diarrhea after eating.      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      Htn (Hypertension)    This is a chronic condition. This is managed by cardiology at [facility].  Current Meds: atenolol 25 mg daily  Side effects: none  Home BP Log: none  Associated symptoms: no cp, no sob           Health Maintenance: Completed    ROS:  See HPI    Objective:     Exam:  /70 (BP Location: Right arm, Patient Position: Sitting, BP Cuff Size: Adult)   Pulse 64   Temp 36.3 °C (97.3 °F) (Temporal)   Ht 1.702 m (5' 7\")   Wt 61.2 kg (135 lb)   SpO2 95%   BMI 21.14 kg/m²  Body mass index is 21.14 kg/m².    Physical Exam  Constitutional:       Appearance: Normal appearance.   Cardiovascular:      Rate and Rhythm: Normal rate and regular rhythm.      Heart sounds: Normal heart sounds.   Pulmonary:      Effort: Pulmonary effort is normal.      Breath sounds: Normal breath sounds.   Musculoskeletal:      Cervical back: Normal range of motion and neck supple.   Neurological:      Mental Status: She is alert.       Assessment & Plan:     72 y.o. female with the following -     1. Primary hypertension  Chronic, controlled.  Blood pressure is at goal under 140/90 in the office today.  We will continue the current regimen.  -atenolol 25 mg daily    2. Functional diarrhea  Chronic, uncontrolled. She reports for years she will swing back " and forth between constipation and diarrhea. For the last month she has struggled with watery diarrhea minutes after eating. It sounds like she has some gastrocolic reflex, but that wouldn't explain diarrhea. She denies abdominal pain, so she doesn't meet diagnostic criteria for IBS. I've encouraged her to follow up with GI.    3. Mixed stress and urge urinary incontinence  Chronic, stable. She struggles with both stress and urge incontinence, but definitely worse when hiking downhill. I suspect this symptom is stress incontinence specifically as hiking downhill can be jarring. I've recommended she do kegel exercises. She will also meet up with a pelvic floor therapy group when she visits her oncologist at [facility].    Referral for genetic research was offered. Patient is already a participant.    Return in about 6 months (around 10/22/2022).    Please note that this dictation was created using voice recognition software. I have made every reasonable attempt to correct obvious errors, but I expect that there are errors of grammar and possibly content that I did not discover before finalizing the note.

## 2022-05-13 ENCOUNTER — HOSPITAL ENCOUNTER (OUTPATIENT)
Dept: LAB | Facility: MEDICAL CENTER | Age: 72
End: 2022-05-13
Attending: FAMILY MEDICINE
Payer: MEDICARE

## 2022-05-13 DIAGNOSIS — Z85.43 HISTORY OF OVARIAN CANCER: ICD-10-CM

## 2022-05-13 LAB — CANCER AG125 SERPL-ACNC: 17.1 U/ML (ref 0–35)

## 2022-05-13 PROCEDURE — 86304 IMMUNOASSAY TUMOR CA 125: CPT

## 2022-05-13 PROCEDURE — 36415 COLL VENOUS BLD VENIPUNCTURE: CPT

## 2022-05-18 ENCOUNTER — OFFICE VISIT (OUTPATIENT)
Dept: CARDIOLOGY | Facility: MEDICAL CENTER | Age: 72
End: 2022-05-18
Payer: MEDICARE

## 2022-05-18 VITALS
HEART RATE: 72 BPM | WEIGHT: 137 LBS | HEIGHT: 67 IN | DIASTOLIC BLOOD PRESSURE: 70 MMHG | OXYGEN SATURATION: 98 % | RESPIRATION RATE: 14 BRPM | SYSTOLIC BLOOD PRESSURE: 118 MMHG | BODY MASS INDEX: 21.5 KG/M2

## 2022-05-18 DIAGNOSIS — I49.1 PAC (PREMATURE ATRIAL CONTRACTION): ICD-10-CM

## 2022-05-18 PROCEDURE — 99204 OFFICE O/P NEW MOD 45 MIN: CPT | Mod: 25 | Performed by: INTERNAL MEDICINE

## 2022-05-18 PROCEDURE — 93000 ELECTROCARDIOGRAM COMPLETE: CPT | Performed by: INTERNAL MEDICINE

## 2022-05-18 ASSESSMENT — FIBROSIS 4 INDEX: FIB4 SCORE: 1.27

## 2022-05-18 NOTE — PROGRESS NOTES
Arrhythmia Clinic Note (New patient)     DOS: 5/18/2022    Referring physician: Dr Grajeda    Chief complaint/Reason for consult: PACs    HPI: 73 y/o F with palpitations, diagnosed with PACs, takes atenolol which helps, wants to establish with local cardiologist. Also history of mitral regurgitation per pt.    ROS (+ highlighted in bold):  Constitutional: Fevers/chills/fatigue/weightloss  HEENT: Blurry vision/eye pain/sore throat/hearing loss  Respiratory: Shortness of breath/cough  Cardiovascular: Chest pain/palpitations/edema/orthopnea/syncope  GI: Nausea/vomitting/diarrhea  MSK: Arthralgias/myagias/muscle weakness  Skin: Rash/sores  Neurological: Numbness/tremors/vertigo  Endocrine: Excessive thirst/polyuria/cold intolerance/heat intolerance  Psych: Depression/anxiety    Past Medical History:   Diagnosis Date   • Backpain     had back surgery, s1-l5 fusion   • Bowel obstruction (HCC)    • Cancer (HCC)     overian cancer   • Heart murmur    • Hypertension    • Indigestion     sometimes   • Other specified symptom associated with female genital organs     had ca ov the ovary   • Schwannoma of cranial nerve (HCC)        Past Surgical History:   Procedure Laterality Date   • HYSTERECTOMY RADICAL  2012   • OOPHORECTOMY Bilateral 2012   • FUSION, SPINE, LUMBAR, PLIF  2001   • CRANIOTOMY  1993    Schwannoma L 5th CN       Social History     Socioeconomic History   • Marital status:      Spouse name: Not on file   • Number of children: Not on file   • Years of education: Not on file   • Highest education level: Master's degree (e.g., MA, MS, Vikram, MEd, MSW, ANA)   Occupational History   • Not on file   Tobacco Use   • Smoking status: Never Smoker   • Smokeless tobacco: Never Used   Vaping Use   • Vaping Use: Never used   Substance and Sexual Activity   • Alcohol use: Yes     Comment: 2 drinks per week on average    • Drug use: No   • Sexual activity: Yes     Partners: Male     Birth control/protection:  Post-Menopausal   Other Topics Concern   •  Service Not Asked   • Blood Transfusions Not Asked   • Caffeine Concern Not Asked   • Occupational Exposure Not Asked   • Hobby Hazards Not Asked   • Sleep Concern No   • Stress Concern Not Asked   • Weight Concern No   • Special Diet Not Asked   • Back Care Not Asked   • Exercise Yes   • Bike Helmet Not Asked   • Seat Belt Not Asked   • Self-Exams Not Asked   Social History Narrative   • Not on file     Social Determinants of Health     Financial Resource Strain: Low Risk    • Difficulty of Paying Living Expenses: Not hard at all   Food Insecurity: No Food Insecurity   • Worried About Running Out of Food in the Last Year: Never true   • Ran Out of Food in the Last Year: Never true   Transportation Needs: No Transportation Needs   • Lack of Transportation (Medical): No   • Lack of Transportation (Non-Medical): No   Physical Activity: Sufficiently Active   • Days of Exercise per Week: 7 days   • Minutes of Exercise per Session: 50 min   Stress: No Stress Concern Present   • Feeling of Stress : Only a little   Social Connections: Moderately Integrated   • Frequency of Communication with Friends and Family: Twice a week   • Frequency of Social Gatherings with Friends and Family: Three times a week   • Attends Restorationist Services: Never   • Active Member of Clubs or Organizations: Yes   • Attends Club or Organization Meetings: More than 4 times per year   • Marital Status:    Intimate Partner Violence: Not on file   Housing Stability: Low Risk    • Unable to Pay for Housing in the Last Year: No   • Number of Places Lived in the Last Year: 1   • Unstable Housing in the Last Year: No       Family History   Problem Relation Age of Onset   • Heart Disease Mother    • Stroke Father    • Cancer Maternal Grandmother         uterine   • Ovarian Cancer Neg Hx    • Tubal Cancer Neg Hx    • Peritoneal Cancer Neg Hx    • Colorectal Cancer Neg Hx    • Breast Cancer Neg Hx   "      Allergies   Allergen Reactions   • Latex Rash     Rash     • Losartan      palpitations   • Tetracycline      Sensitivity to sun; sun burns.  Reaction date; 15 years ago from 2013.       Current Outpatient Medications   Medication Sig Dispense Refill   • atenolol (TENORMIN) 25 MG Tab TAKE 1 TABLET BY MOUTH EVERY EVENING 90 Tablet 3   • escitalopram (LEXAPRO) 10 MG Tab TAKE 1 TABLET BY MOUTH EVERY MORNING 90 Tablet 3   • fluticasone (FLONASE) 50 MCG/ACT nasal spray Spray 1 Spray in nose every day.     • Ayr Saline Nasal Gel Spray  in nose.     • LORazepam (ATIVAN) 1 MG Tab Take 1 mg by mouth.     • ondansetron (ZOFRAN) 8 MG Tab Take 8 mg by mouth.     • calcium citrate (CALCITRATE) 950 MG Tab Take 950 mg by mouth every day.     • polyethyl glycol-propyl glycol (SYSTANE) 0.4-0.3 % Solution Place 1 Drop in both eyes every morning.     • vitamin D (CHOLECALCIFEROL) 1000 UNIT TABS Take 2,000 Units by mouth every day.     • aspirin 81 MG EC tablet Take 81 mg by mouth every day.     • Multiple Vitamins-Minerals (SENIOR MULTIVITAMIN PLUS PO) Take 1 Tab by mouth every morning.     • docosahexanoic acid (OMEGA 3 FA) 1000 MG CAPS Take 1,000 mg by mouth every morning.       No current facility-administered medications for this visit.       Physical Exam:  Vitals:    05/18/22 1307   BP: 118/70   BP Location: Left arm   Patient Position: Sitting   BP Cuff Size: Adult   Pulse: 72   Resp: 14   SpO2: 98%   Weight: 62.1 kg (137 lb)   Height: 1.702 m (5' 7\")     General appearance: NAD, conversant   Eyes: anicteric sclerae, moist conjunctivae; no lid-lag; PERRLA  HENT: Atraumatic; oropharynx clear with moist mucous membranes and no mucosal ulcerations; normal hard and soft palate  Neck: Trachea midline; FROM, supple, no thyromegaly or lymphadenopathy  Lungs: CTA, with normal respiratory effort and no intercostal retractions  CV: RRR, no MRGs, no JVD   Abdomen: Soft, non-tender; no masses or HSM  Extremities: No peripheral edema " or extremity lymphadenopathy  Skin: Normal temperature, turgor and texture; no rash, ulcers or subcutaneous nodules  Psych: Appropriate affect, alert and oriented to person, place and time    Data:  Lipids:   No results found for: CHOLSTRLTOT, TRIGLYCERIDE, HDL, LDL     BMP:  Lab Results   Component Value Date/Time    SODIUM 139 03/11/2022 0736    POTASSIUM 5.3 03/11/2022 0736    CHLORIDE 103 03/11/2022 0736    CO2 26 03/11/2022 0736    GLUCOSE 98 03/11/2022 0736    BUN 24 (H) 03/11/2022 0736    CREATININE 1.12 03/11/2022 0736    CALCIUM 9.1 03/11/2022 0736    ANION 10.0 03/11/2022 0736        TSH:   Lab Results   Component Value Date/Time    TSHULTRASEN 1.430 03/11/2022 0736        THYROXINE (T4):   No results found for: MELO     CBC:   Lab Results   Component Value Date/Time    WBC 3.5 (L) 03/11/2022 07:36 AM    RBC 4.87 03/11/2022 07:36 AM    HEMOGLOBIN 14.3 03/11/2022 07:36 AM    HEMATOCRIT 43.8 03/11/2022 07:36 AM    MCV 89.9 03/11/2022 07:36 AM    MCH 29.4 03/11/2022 07:36 AM    MCHC 32.6 (L) 03/11/2022 07:36 AM    RDW 46.8 03/11/2022 07:36 AM    PLATELETCT 328 03/11/2022 07:36 AM    MPV 10.4 03/11/2022 07:36 AM    NEUTSPOLYS 76.20 (H) 02/26/2019 10:05 PM    LYMPHOCYTES 17.50 (L) 02/26/2019 10:05 PM    MONOCYTES 4.40 02/26/2019 10:05 PM    EOSINOPHILS 0.90 02/26/2019 10:05 PM    BASOPHILS 0.70 02/26/2019 10:05 PM    IMMGRAN 0.30 02/26/2019 10:05 PM    NRBC 0.00 02/26/2019 10:05 PM    NEUTS 11.58 (H) 02/26/2019 10:05 PM    LYMPHS 2.65 02/26/2019 10:05 PM    MONOS 0.66 02/26/2019 10:05 PM    EOS 0.13 02/26/2019 10:05 PM    BASO 0.10 02/26/2019 10:05 PM    IMMGRANAB 0.04 02/26/2019 10:05 PM    NRBCAB 0.00 02/26/2019 10:05 PM        CBC w/o DIFF  Lab Results   Component Value Date/Time    WBC 3.5 (L) 03/11/2022 07:36 AM    RBC 4.87 03/11/2022 07:36 AM    HEMOGLOBIN 14.3 03/11/2022 07:36 AM    MCV 89.9 03/11/2022 07:36 AM    MCH 29.4 03/11/2022 07:36 AM    MCHC 32.6 (L) 03/11/2022 07:36 AM    RDW 46.8 03/11/2022  07:36 AM    MPV 10.4 03/11/2022 07:36 AM         EKG interpreted by me: BRYCE    Impression/Plan:  1. PAC (premature atrial contraction)  EKG    EC-ECHOCARDIOGRAM COMPLETE W/O CONT     1. PACs  2. Mitral regurgitation    - Continue atenolol for PACs  - Order echo to assess MR    FV annual      Cristofer Paul MD  Cardiac Electrophysiology

## 2022-05-23 LAB — EKG IMPRESSION: NORMAL

## 2022-06-13 ENCOUNTER — TELEPHONE (OUTPATIENT)
Dept: MEDICAL GROUP | Facility: PHYSICIAN GROUP | Age: 72
End: 2022-06-13
Payer: MEDICARE

## 2022-06-13 NOTE — TELEPHONE ENCOUNTER
1. Caller Name: Elsie @    [facility]                     Call Back Number: [phone]      How would the patient prefer to be co    [facility] called stating they do not perform pelvic floor therapy, however they suggest all patients to [facility] phone number : [phone]    Diagnosis   N39.46 (ICD-10-CM) - Mixed stress and urge urinary incontinence

## 2022-06-13 NOTE — TELEPHONE ENCOUNTER
Please contact the referral department. Can they reprocess the referral to another office, [facility] if covered, who does do pelvic floor therapy?

## 2022-07-27 ENCOUNTER — OFFICE VISIT (OUTPATIENT)
Dept: MEDICAL GROUP | Facility: PHYSICIAN GROUP | Age: 72
End: 2022-07-27
Payer: MEDICARE

## 2022-07-27 VITALS
BODY MASS INDEX: 20.88 KG/M2 | TEMPERATURE: 97.6 F | OXYGEN SATURATION: 95 % | HEART RATE: 64 BPM | WEIGHT: 133 LBS | HEIGHT: 67 IN | SYSTOLIC BLOOD PRESSURE: 104 MMHG | DIASTOLIC BLOOD PRESSURE: 68 MMHG

## 2022-07-27 DIAGNOSIS — N39.46 MIXED STRESS AND URGE URINARY INCONTINENCE: ICD-10-CM

## 2022-07-27 DIAGNOSIS — Z86.16 HISTORY OF COVID-19: Primary | ICD-10-CM

## 2022-07-27 PROCEDURE — 99213 OFFICE O/P EST LOW 20 MIN: CPT | Performed by: FAMILY MEDICINE

## 2022-07-27 RX ORDER — CONJUGATED ESTROGENS 0.62 MG/G
0.5 CREAM VAGINAL DAILY
COMMUNITY
Start: 2022-05-25

## 2022-07-27 ASSESSMENT — ENCOUNTER SYMPTOMS
CHILLS: 0
SHORTNESS OF BREATH: 0
FEVER: 0

## 2022-07-27 ASSESSMENT — FIBROSIS 4 INDEX: FIB4 SCORE: 1.27

## 2022-07-27 NOTE — LETTER
2022        Regarding: Padmini Paz  : 1950      She tested positive for COVID-19 on 2022. Since then she is feeling completely recovered with no residual symptoms. She is well past the CDC guidelines for discontinuing self-isolation. Therefore she is cleared to travel.    Thank you,  Martina Grajeda M.D.

## 2022-07-27 NOTE — PROGRESS NOTES
"Subjective:     CC: COVID letter    HPI:   Zena presents today with    Problem   History of Covid-19    5/2020 - no treatment     Urinary Incontinence    Years, worse in last couple of years  +stress symptoms  +urge symptoms  Notices it the most when she hikes downhill    Recently started on vaginal estrogen cream, helping a little.      Age-Related Osteoporosis Without Current Pathological Fracture    3/2022: transitioned back to Prolia  7/2021: transitioned to Fosamax  2020 DEXA: LS T -1.3, L femur T -1.7  2015: Prolia started    She developed osteoporosis from her chemotherapy.          Health Maintenance: Completed    ROS:  Review of Systems   Constitutional: Negative for chills and fever.   Respiratory: Negative for shortness of breath.    Cardiovascular: Negative for chest pain.       Objective:     Exam:  /68 (BP Location: Right arm, Patient Position: Sitting, BP Cuff Size: Adult)   Pulse 64   Temp 36.4 °C (97.6 °F) (Temporal)   Ht 1.702 m (5' 7\")   Wt 60.3 kg (133 lb)   SpO2 95%   BMI 20.83 kg/m²  Body mass index is 20.83 kg/m².    Constitutional: Alert, no distress, well-groomed.  Skin: Warm, dry, good turgor, no rashes in visible areas.  Eye: Equal, round and reactive, conjunctiva clear, lids normal.  ENMT: Lips without lesions, good dentition, moist mucous membranes.  Neck: Trachea midline, no masses, no thyromegaly.  Respiratory: Unlabored respiratory effort, no cough.  MSK: Normal gait, moves all extremities.  Neuro: Grossly non-focal.   Psych: Alert and oriented x3, normal affect and mood.    Assessment & Plan:     72 y.o. female with the following -     1. History of COVID-19  Here today to get a letter for proof of recovery for upcoming travel.  Letter was written and provided to the patient prior to discharge.      2. Mixed stress and urge urinary incontinence  Chronic, slightly improved. Her gynecologist at [facility] is doing some testing but in the meantime she has been started on vaginal " estrogen and she does report some improvement with her urinary incontinence symptoms.  She will continue the vaginal estrogen and continue to follow with gynecology as directed.    Referral for genetic research was offered. Patient is already a participant.    Return if symptoms worsen or fail to improve.    Please note that this dictation was created using voice recognition software. I have made every reasonable attempt to correct obvious errors, but I expect that there are errors of grammar and possibly content that I did not discover before finalizing the note.

## 2022-08-19 ENCOUNTER — OUTPATIENT INFUSION SERVICES (OUTPATIENT)
Dept: ONCOLOGY | Facility: MEDICAL CENTER | Age: 72
End: 2022-08-19
Attending: FAMILY MEDICINE
Payer: MEDICARE

## 2022-08-19 VITALS
WEIGHT: 137.13 LBS | DIASTOLIC BLOOD PRESSURE: 73 MMHG | HEART RATE: 56 BPM | TEMPERATURE: 97.4 F | HEIGHT: 66 IN | OXYGEN SATURATION: 98 % | SYSTOLIC BLOOD PRESSURE: 120 MMHG | RESPIRATION RATE: 18 BRPM | BODY MASS INDEX: 22.04 KG/M2

## 2022-08-19 DIAGNOSIS — M81.0 AGE-RELATED OSTEOPOROSIS WITHOUT CURRENT PATHOLOGICAL FRACTURE: ICD-10-CM

## 2022-08-19 LAB
CA-I BLD ISE-SCNC: 1.24 MMOL/L (ref 1.1–1.3)
CREAT BLD-MCNC: 1.2 MG/DL (ref 0.5–1.4)

## 2022-08-19 PROCEDURE — 82330 ASSAY OF CALCIUM: CPT

## 2022-08-19 PROCEDURE — 82565 ASSAY OF CREATININE: CPT

## 2022-08-19 PROCEDURE — 96372 THER/PROPH/DIAG INJ SC/IM: CPT

## 2022-08-19 PROCEDURE — 36415 COLL VENOUS BLD VENIPUNCTURE: CPT

## 2022-08-19 PROCEDURE — 700111 HCHG RX REV CODE 636 W/ 250 OVERRIDE (IP): Mod: JG | Performed by: FAMILY MEDICINE

## 2022-08-19 RX ORDER — DIPHENHYDRAMINE HYDROCHLORIDE 50 MG/ML
50 INJECTION INTRAMUSCULAR; INTRAVENOUS PRN
Status: CANCELLED | OUTPATIENT
Start: 2023-02-04

## 2022-08-19 RX ORDER — EPINEPHRINE 1 MG/ML(1)
0.5 AMPUL (ML) INJECTION PRN
Status: CANCELLED | OUTPATIENT
Start: 2023-02-04

## 2022-08-19 RX ORDER — METHYLPREDNISOLONE SODIUM SUCCINATE 125 MG/2ML
125 INJECTION, POWDER, LYOPHILIZED, FOR SOLUTION INTRAMUSCULAR; INTRAVENOUS PRN
Status: CANCELLED | OUTPATIENT
Start: 2023-02-04

## 2022-08-19 RX ADMIN — DENOSUMAB 60 MG: 60 INJECTION SUBCUTANEOUS at 15:13

## 2022-08-19 ASSESSMENT — FIBROSIS 4 INDEX: FIB4 SCORE: 1.27

## 2022-08-19 NOTE — PROGRESS NOTES
Patient to [facility] for Prolia injection. Istat Ca+ and Cr performed. Patient meets parameters for Prolia. Prolia injected into left back of arm. Patient has future appointment. Patient to home in care of self.

## 2022-09-09 ENCOUNTER — HOSPITAL ENCOUNTER (OUTPATIENT)
Dept: CARDIOLOGY | Facility: MEDICAL CENTER | Age: 72
End: 2022-09-09
Attending: INTERNAL MEDICINE
Payer: MEDICARE

## 2022-09-09 DIAGNOSIS — I49.1 PAC (PREMATURE ATRIAL CONTRACTION): ICD-10-CM

## 2022-09-09 LAB
LV EJECT FRACT  99904: 60
LV EJECT FRACT MOD 2C 99903: 68.15
LV EJECT FRACT MOD 4C 99902: 58.99
LV EJECT FRACT MOD BP 99901: 64.46

## 2022-09-09 PROCEDURE — 93306 TTE W/DOPPLER COMPLETE: CPT

## 2022-09-09 PROCEDURE — 93306 TTE W/DOPPLER COMPLETE: CPT | Mod: 26 | Performed by: INTERNAL MEDICINE

## 2022-09-12 ENCOUNTER — HOSPITAL ENCOUNTER (OUTPATIENT)
Facility: MEDICAL CENTER | Age: 72
End: 2022-09-12
Attending: FAMILY MEDICINE
Payer: MEDICARE

## 2022-09-12 ENCOUNTER — OFFICE VISIT (OUTPATIENT)
Dept: MEDICAL GROUP | Facility: PHYSICIAN GROUP | Age: 72
End: 2022-09-12
Payer: MEDICARE

## 2022-09-12 VITALS
OXYGEN SATURATION: 97 % | HEART RATE: 60 BPM | TEMPERATURE: 97.6 F | HEIGHT: 67 IN | SYSTOLIC BLOOD PRESSURE: 114 MMHG | BODY MASS INDEX: 21.16 KG/M2 | WEIGHT: 134.8 LBS | DIASTOLIC BLOOD PRESSURE: 64 MMHG

## 2022-09-12 DIAGNOSIS — M81.0 AGE-RELATED OSTEOPOROSIS WITHOUT CURRENT PATHOLOGICAL FRACTURE: ICD-10-CM

## 2022-09-12 DIAGNOSIS — R82.81 PYURIA: ICD-10-CM

## 2022-09-12 DIAGNOSIS — Z12.31 ENCOUNTER FOR SCREENING MAMMOGRAM FOR MALIGNANT NEOPLASM OF BREAST: ICD-10-CM

## 2022-09-12 DIAGNOSIS — N18.31 STAGE 3A CHRONIC KIDNEY DISEASE: ICD-10-CM

## 2022-09-12 PROCEDURE — 81003 URINALYSIS AUTO W/O SCOPE: CPT

## 2022-09-12 PROCEDURE — 87086 URINE CULTURE/COLONY COUNT: CPT

## 2022-09-12 PROCEDURE — 99213 OFFICE O/P EST LOW 20 MIN: CPT | Performed by: FAMILY MEDICINE

## 2022-09-12 RX ORDER — LORAZEPAM 1 MG/1
0.5 TABLET ORAL
COMMUNITY
Start: 2022-08-25

## 2022-09-12 ASSESSMENT — ENCOUNTER SYMPTOMS
FEVER: 0
SHORTNESS OF BREATH: 0
CHILLS: 0

## 2022-09-12 ASSESSMENT — FIBROSIS 4 INDEX: FIB4 SCORE: 1.27

## 2022-09-12 NOTE — PROGRESS NOTES
"Subjective:     CC: urine labs    HPI:   Zena presents today with    Problem   Pyuria    8/21 - UA showed 3 RBC/HPF  8/24 - UA showed 1 RBC/HPF, 8 WBC/HPF, +bacteria    The day before 2nd UA she reports UTI symptoms - +dysuria, +urgency, +frequently, no vaginal discharge/irritation  [facility] nephrology didn't prescribe anything  She had some antibiotics, so she took bactrim x3 days - helped but still a little burning         Health Maintenance: Completed    ROS:  Review of Systems   Constitutional:  Negative for chills and fever.   Respiratory:  Negative for shortness of breath.    Cardiovascular:  Negative for chest pain.     Objective:     Exam:  /64 (BP Location: Right arm, Patient Position: Sitting, BP Cuff Size: Adult)   Pulse 60   Temp 36.4 °C (97.6 °F) (Temporal)   Ht 1.702 m (5' 7\")   Wt 61.1 kg (134 lb 12.8 oz)   SpO2 97%   BMI 21.11 kg/m²  Body mass index is 21.11 kg/m².    Physical Exam  Constitutional:       Appearance: Normal appearance.   Cardiovascular:      Rate and Rhythm: Normal rate and regular rhythm.      Heart sounds: Normal heart sounds.   Pulmonary:      Effort: Pulmonary effort is normal.      Breath sounds: Normal breath sounds.   Musculoskeletal:      Cervical back: Normal range of motion and neck supple.   Neurological:      Mental Status: She is alert.       Assessment & Plan:     72 y.o. female with the following -     1. Pyuria  Acute.  Approximately 3 weeks ago she got a urinalysis for her nephrologist in [facility].  Showed red blood cells so she then repeated the urinalysis.  By that point she start developing symptoms of UTI and it did show white blood cells and bacteria in her urine.  The nephrologist did not prescribe her any medications referred her to me.  She had some pocket Bactrim that she did take for 3 days and it helped but she still having some dysuria.  We will get a repeat a urinalysis with a urine culture.  - URINALYSIS; Future  - URINE CULTURE(NEW); Future    2. " Age-related osteoporosis without current pathological fracture  Chronic, stable.  She has been following with [facility] endocrinology but would like to establish with a local endocrinologist.  She was recently transition to Prolia as the endocrinologist did some lab testing showing that she was not responding well to the Fosamax.  She will continue the Prolia and establish with endocrinology here in town.  - Referral to Endocrinology    3. Encounter for screening mammogram for malignant neoplasm of breast  - MA-SCREENING MAMMO BILAT W/TOMOSYNTHESIS W/CAD; Future      HCC Gap Form    Diagnosis to address: N18.31 - Chronic kidney disease, stage 3a ([facility])  Assessment and plan: Chronic, stable. She will continue to follow with [facility] nephrology and we will monitor labs every 6-12 months.  Last edited 09/12/22 13:56 PDT by Martina Grajeda M.D.       Referral for genetic research was offered. Patient is already a participant.    I spent a total of 22 minutes with record review, exam, communication with the patient,  and documentation of this encounter.    Return if symptoms worsen or fail to improve.    Please note that this dictation was created using voice recognition software. I have made every reasonable attempt to correct obvious errors, but I expect that there are errors of grammar and possibly content that I did not discover before finalizing the note.

## 2022-09-13 LAB
APPEARANCE UR: CLEAR
BILIRUB UR QL STRIP.AUTO: NEGATIVE
COLOR UR: YELLOW
GLUCOSE UR STRIP.AUTO-MCNC: NEGATIVE MG/DL
KETONES UR STRIP.AUTO-MCNC: NEGATIVE MG/DL
LEUKOCYTE ESTERASE UR QL STRIP.AUTO: NEGATIVE
MICRO URNS: NORMAL
NITRITE UR QL STRIP.AUTO: NEGATIVE
PH UR STRIP.AUTO: 6.5 [PH] (ref 5–8)
PROT UR QL STRIP: NEGATIVE MG/DL
RBC UR QL AUTO: NEGATIVE
SP GR UR STRIP.AUTO: 1.01
UROBILINOGEN UR STRIP.AUTO-MCNC: 0.2 MG/DL

## 2022-09-15 LAB
BACTERIA UR CULT: NORMAL
SIGNIFICANT IND 70042: NORMAL
SITE SITE: NORMAL
SOURCE SOURCE: NORMAL

## 2022-10-12 ENCOUNTER — NON-PROVIDER VISIT (OUTPATIENT)
Dept: OCCUPATIONAL MEDICINE | Facility: CLINIC | Age: 72
End: 2022-10-12

## 2022-10-13 NOTE — PROGRESS NOTES
Travel dates: Nov '22, Jan '23, Mar '23, and Apr '23    Countries to be visited:  [place] (Nov), [place] and [place] (Jan), [place], [place], [place], [place] (Mar), and [place] (Apr)    Reason for travel:  Pleasure- will be traveling with [employer]      Rural travel: yes  High altitude travel: no     Accommodations:  Hotel: yes   Hostel:  Camping: yes  Cruise:  With family:  Other:    Vaccines in past 30 days? Flu shot and COVID booster 9/19/22    Sick today? No  Allergies: Tetracycline, and Latex bandages     The screening intake form was reviewed with the traveler. Health risks associated with their travel plans have been reviewed and discussed with the traveler. The traveler has been provided with vaccine information statements for the vaccines that are recommended and given the opportunity to discuss risks and benefits of vaccination and or medications. The traveler has received education on the travel itinerary provided and on the following topics.    Personal safety precautions: Yes  Food and water precautions: Yes  Management of traveler's diarrhea: Yes  Mosquito/insect bite prevention: yes  Animal bites/Rabies prevention: yes  High altitude precautions: No      RN comments:     Consultation only today.     Recommend Typhoid for all upcoming trips.     Recommend Malaria PPX for trips to  countries.           Physician consultation required: no

## 2022-10-21 SDOH — ECONOMIC STABILITY: FOOD INSECURITY: WITHIN THE PAST 12 MONTHS, THE FOOD YOU BOUGHT JUST DIDN'T LAST AND YOU DIDN'T HAVE MONEY TO GET MORE.: NEVER TRUE

## 2022-10-21 SDOH — ECONOMIC STABILITY: HOUSING INSECURITY: IN THE LAST 12 MONTHS, HOW MANY PLACES HAVE YOU LIVED?: 1

## 2022-10-21 SDOH — ECONOMIC STABILITY: INCOME INSECURITY: HOW HARD IS IT FOR YOU TO PAY FOR THE VERY BASICS LIKE FOOD, HOUSING, MEDICAL CARE, AND HEATING?: NOT HARD AT ALL

## 2022-10-21 SDOH — ECONOMIC STABILITY: FOOD INSECURITY: WITHIN THE PAST 12 MONTHS, YOU WORRIED THAT YOUR FOOD WOULD RUN OUT BEFORE YOU GOT MONEY TO BUY MORE.: NEVER TRUE

## 2022-10-21 SDOH — HEALTH STABILITY: PHYSICAL HEALTH: ON AVERAGE, HOW MANY DAYS PER WEEK DO YOU ENGAGE IN MODERATE TO STRENUOUS EXERCISE (LIKE A BRISK WALK)?: 7 DAYS

## 2022-10-21 SDOH — HEALTH STABILITY: PHYSICAL HEALTH: ON AVERAGE, HOW MANY MINUTES DO YOU ENGAGE IN EXERCISE AT THIS LEVEL?: 30 MIN

## 2022-10-21 SDOH — ECONOMIC STABILITY: INCOME INSECURITY: IN THE LAST 12 MONTHS, WAS THERE A TIME WHEN YOU WERE NOT ABLE TO PAY THE MORTGAGE OR RENT ON TIME?: NO

## 2022-10-21 ASSESSMENT — SOCIAL DETERMINANTS OF HEALTH (SDOH)
DO YOU BELONG TO ANY CLUBS OR ORGANIZATIONS SUCH AS CHURCH GROUPS UNIONS, FRATERNAL OR ATHLETIC GROUPS, OR SCHOOL GROUPS?: YES
HOW OFTEN DO YOU GET TOGETHER WITH FRIENDS OR RELATIVES?: ONCE A WEEK
HOW OFTEN DO YOU ATTENT MEETINGS OF THE CLUB OR ORGANIZATION YOU BELONG TO?: MORE THAN 4 TIMES PER YEAR
IN A TYPICAL WEEK, HOW MANY TIMES DO YOU TALK ON THE PHONE WITH FAMILY, FRIENDS, OR NEIGHBORS?: MORE THAN THREE TIMES A WEEK
HOW OFTEN DO YOU HAVE A DRINK CONTAINING ALCOHOL: 2-4 TIMES A MONTH
DO YOU BELONG TO ANY CLUBS OR ORGANIZATIONS SUCH AS CHURCH GROUPS UNIONS, FRATERNAL OR ATHLETIC GROUPS, OR SCHOOL GROUPS?: YES
IN A TYPICAL WEEK, HOW MANY TIMES DO YOU TALK ON THE PHONE WITH FAMILY, FRIENDS, OR NEIGHBORS?: MORE THAN THREE TIMES A WEEK
HOW OFTEN DO YOU GET TOGETHER WITH FRIENDS OR RELATIVES?: ONCE A WEEK
HOW MANY DRINKS CONTAINING ALCOHOL DO YOU HAVE ON A TYPICAL DAY WHEN YOU ARE DRINKING: 1 OR 2
HOW OFTEN DO YOU ATTENT MEETINGS OF THE CLUB OR ORGANIZATION YOU BELONG TO?: MORE THAN 4 TIMES PER YEAR
HOW HARD IS IT FOR YOU TO PAY FOR THE VERY BASICS LIKE FOOD, HOUSING, MEDICAL CARE, AND HEATING?: NOT HARD AT ALL
WITHIN THE PAST 12 MONTHS, YOU WORRIED THAT YOUR FOOD WOULD RUN OUT BEFORE YOU GOT THE MONEY TO BUY MORE: NEVER TRUE
HOW OFTEN DO YOU HAVE SIX OR MORE DRINKS ON ONE OCCASION: NEVER
HOW OFTEN DO YOU ATTEND CHURCH OR RELIGIOUS SERVICES?: NEVER
HOW OFTEN DO YOU ATTEND CHURCH OR RELIGIOUS SERVICES?: NEVER

## 2022-10-21 ASSESSMENT — LIFESTYLE VARIABLES
HOW MANY STANDARD DRINKS CONTAINING ALCOHOL DO YOU HAVE ON A TYPICAL DAY: 1 OR 2
AUDIT-C TOTAL SCORE: 2
SKIP TO QUESTIONS 9-10: 1
HOW OFTEN DO YOU HAVE A DRINK CONTAINING ALCOHOL: 2-4 TIMES A MONTH
HOW OFTEN DO YOU HAVE SIX OR MORE DRINKS ON ONE OCCASION: NEVER

## 2022-10-24 ENCOUNTER — OFFICE VISIT (OUTPATIENT)
Dept: MEDICAL GROUP | Facility: PHYSICIAN GROUP | Age: 72
End: 2022-10-24
Payer: MEDICARE

## 2022-10-24 VITALS
TEMPERATURE: 97.6 F | RESPIRATION RATE: 16 BRPM | DIASTOLIC BLOOD PRESSURE: 60 MMHG | HEIGHT: 67 IN | WEIGHT: 138 LBS | HEART RATE: 97 BPM | SYSTOLIC BLOOD PRESSURE: 100 MMHG | BODY MASS INDEX: 21.66 KG/M2 | OXYGEN SATURATION: 96 %

## 2022-10-24 DIAGNOSIS — Z71.84 TRAVEL ADVICE ENCOUNTER: ICD-10-CM

## 2022-10-24 DIAGNOSIS — I10 PRIMARY HYPERTENSION: ICD-10-CM

## 2022-10-24 DIAGNOSIS — Z00.00 ENCOUNTER FOR MEDICARE ANNUAL WELLNESS EXAM: Primary | ICD-10-CM

## 2022-10-24 DIAGNOSIS — K59.1 FUNCTIONAL DIARRHEA: ICD-10-CM

## 2022-10-24 DIAGNOSIS — E78.00 PURE HYPERCHOLESTEROLEMIA: ICD-10-CM

## 2022-10-24 DIAGNOSIS — M81.0 AGE-RELATED OSTEOPOROSIS WITHOUT CURRENT PATHOLOGICAL FRACTURE: ICD-10-CM

## 2022-10-24 DIAGNOSIS — G62.9 NEUROPATHY: ICD-10-CM

## 2022-10-24 DIAGNOSIS — N39.46 MIXED STRESS AND URGE URINARY INCONTINENCE: ICD-10-CM

## 2022-10-24 DIAGNOSIS — N18.31 STAGE 3A CHRONIC KIDNEY DISEASE: ICD-10-CM

## 2022-10-24 PROBLEM — M89.8X1 CLAVICLE PAIN: Status: RESOLVED | Noted: 2022-02-14 | Resolved: 2022-10-24

## 2022-10-24 PROBLEM — E78.5 HYPERLIPIDEMIA: Status: ACTIVE | Noted: 2022-10-24

## 2022-10-24 PROBLEM — N89.8 VAGINAL LESION: Status: RESOLVED | Noted: 2022-03-09 | Resolved: 2022-10-24

## 2022-10-24 PROBLEM — R82.81 PYURIA: Status: RESOLVED | Noted: 2022-09-12 | Resolved: 2022-10-24

## 2022-10-24 PROCEDURE — G0439 PPPS, SUBSEQ VISIT: HCPCS | Performed by: FAMILY MEDICINE

## 2022-10-24 PROCEDURE — 99213 OFFICE O/P EST LOW 20 MIN: CPT | Mod: 25 | Performed by: FAMILY MEDICINE

## 2022-10-24 RX ORDER — SULFAMETHOXAZOLE AND TRIMETHOPRIM 800; 160 MG/1; MG/1
1 TABLET ORAL 2 TIMES DAILY
Qty: 6 TABLET | Refills: 0 | Status: SHIPPED | OUTPATIENT
Start: 2022-10-24 | End: 2022-10-27

## 2022-10-24 RX ORDER — AZITHROMYCIN 500 MG/1
1000 TABLET, FILM COATED ORAL
Qty: 2 TABLET | Refills: 0 | Status: SHIPPED | OUTPATIENT
Start: 2022-10-24 | End: 2022-10-25

## 2022-10-24 ASSESSMENT — ACTIVITIES OF DAILY LIVING (ADL): BATHING_REQUIRES_ASSISTANCE: 0

## 2022-10-24 ASSESSMENT — PATIENT HEALTH QUESTIONNAIRE - PHQ9: CLINICAL INTERPRETATION OF PHQ2 SCORE: 0

## 2022-10-24 ASSESSMENT — ENCOUNTER SYMPTOMS: GENERAL WELL-BEING: GOOD

## 2022-10-24 ASSESSMENT — FIBROSIS 4 INDEX: FIB4 SCORE: 1.27

## 2022-10-24 NOTE — PROGRESS NOTES
Chief Complaint   Patient presents with    Annual Exam     Medicare        HPI:  Padmini is a 72 y.o. here for Medicare Annual Wellness Visit    Patient Active Problem List    Diagnosis Date Noted    Hyperlipidemia 10/24/2022    History of COVID-19 07/27/2022    Urinary incontinence 04/22/2022    PAC (premature atrial contraction) 02/28/2022    Diarrhea 11/10/2020    Environmental allergies 02/24/2020    Hot flashes 02/14/2020    Chronic pain of right knee 06/20/2019    History of small bowel obstruction 08/20/2018    Chronic kidney disease, stage 3, mod decreased GFR (HCC) 08/14/2018    Age-related osteoporosis without current pathological fracture 08/14/2018    History of ovarian cancer 04/05/2018    Neuropathy 04/05/2018    Pancreatic insufficiency 04/05/2018    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 If develop symptoms of UTI. 6 Tablet 0    azithromycin (ZITHROMAX) 500 MG tablet Take 2 Tablets by mouth one time as needed (traveler's diarrhea) for up to 1 day. 2 Tablet 0    LORazepam (ATIVAN) 1 MG Tab Take 0.5 mg by mouth.      denosumab (PROLIA) 60 MG/ML Solution Prefilled Syringe injection Inject 60 mg under the skin.      estrogens, conjugated (PREMARIN) 0.625 MG/GM Cream Insert 0.5 g into the vagina every day.      atenolol (TENORMIN) 25 MG Tab TAKE 1 TABLET BY MOUTH EVERY EVENING 90 Tablet 3    escitalopram (LEXAPRO) 10 MG Tab TAKE 1 TABLET BY MOUTH EVERY MORNING 90 Tablet 3    fluticasone (FLONASE) 50 MCG/ACT nasal spray Spray 1 Spray in nose every day.      Ayr Saline Nasal Gel Spray  in nose.      LORazepam (ATIVAN) 1 MG Tab Take 1 mg by mouth.      ondansetron (ZOFRAN) 8 MG Tab Take 8 mg by mouth.      calcium citrate (CALCITRATE) 950 MG Tab Take 950 mg by mouth  every day.      polyethyl glycol-propyl glycol (SYSTANE) 0.4-0.3 % Solution Place 1 Drop in both eyes every morning.      vitamin D (CHOLECALCIFEROL) 1000 UNIT TABS Take 2,000 Units by mouth every day.      Multiple Vitamins-Minerals (SENIOR MULTIVITAMIN PLUS PO) Take 1 Tab by mouth every morning.      docosahexanoic acid (OMEGA 3 FA) 1000 MG CAPS Take 1,000 mg by mouth every morning.       No current facility-administered medications for this visit.        Patient is taking medications as noted in medication list.  Current supplements as per medication list.     Allergies: Latex, Losartan, and Tetracycline    Current social contact/activities:  Choir, swim, hiking groups    Is patient current with immunizations? Yes.    She  reports that she has never smoked. She has never used smokeless tobacco. She reports current alcohol use. She reports that she does not use drugs.  Counseling given: Yes      ROS:    Gait: Uses no assistive device   Ostomy: No   Other tubes: No   Amputations: No   Chronic oxygen use No   Last eye exam  10/2022  Wears hearing aids: No   : Reports urinary leakage during the last 6 months that has somewhat interfered with their daily activities or sleep.    Screening:    Depression Screening  Little interest or pleasure in doing things?  0 - not at all  Feeling down, depressed, or hopeless? 0 - not at all  Trouble falling or staying asleep, or sleeping too much?     Feeling tired or having little energy?     Poor appetite or overeating?     Feeling bad about yourself - or that you are a failure or have let yourself or your family down?    Trouble concentrating on things, such as reading the newspaper or watching television?    Moving or speaking so slowly that other people could have noticed.  Or the opposite - being so fidgety or restless that you have been moving around a lot more than usual?     Thoughts that you would be better off dead, or of hurting yourself?     Patient Health  Questionnaire Score:      If depressive symptoms identified deferred to follow up visit unless specifically addressed in assessment and plan.    Interpretation of PHQ-9 Total Score   Score Severity   1-4 No Depression   5-9 Mild Depression   10-14 Moderate Depression   15-19 Moderately Severe Depression   20-27 Severe Depression    Screening for Cognitive Impairment  Three Minute Recall (daughter, heaven, mountain)  3/3    Draw clock face with all 12 numbers and set the hands to show 10 past 11.  Yes    If cognitive concerns identified, deferred for follow up unless specifically addressed in assessment and plan.    Fall Risk Assessment  Has the patient had two or more falls in the last year or any fall with injury in the last year?  No  If fall risk identified, deferred for follow up unless specifically addressed in assessment and plan.    Safety Assessment  Throw rugs on floor.  Yes  Handrails on all stairs.  No - no stairs  Good lighting in all hallways.  No  Difficulty hearing.  No  Patient counseled about all safety risks that were identified.    Functional Assessment ADLs  Are there any barriers preventing you from cooking for yourself or meeting nutritional needs?  No.    Are there any barriers preventing you from driving safely or obtaining transportation?  No.    Are there any barriers preventing you from using a telephone or calling for help?  No.    Are there any barriers preventing you from shopping?  No.    Are there any barriers preventing you from taking care of your own finances?  No.    Are there any barriers preventing you from managing your medications?  No.    Are there any barriers preventing you from showering, bathing or dressing yourself?  No.    Are you currently engaging in any exercise or physical activity?  Yes.     What is your perception of your health?  Good.    Advance Care Planning  Do you have an Advance Directive, Living Will, Durable Power of , or POLST? Yes                Health Maintenance Summary            Scheduled - MAMMOGRAM (Yearly) Scheduled for 11/28/2022      10/25/2021  MA-SCREENING MAMMO BILAT W/TOMOSYNTHESIS W/CAD    09/28/2020  MA-SCREENING MAMMO BILAT W/TOMOSYNTHESIS W/CAD    09/26/2019  MA-SCREENING MAMMO BILAT W/TOMOSYNTHESIS W/CAD    09/18/2018  MA-MAMMO SCREENING BILAT W/ABDI W/CAD              Annual Wellness Visit (Every 366 Days) Next due on 10/25/2023      10/24/2022  Visit Dx: Encounter for Medicare annual wellness exam    10/14/2021  Level of Service: [place] ANNUAL WELLNESS VISIT-INCLUDES PPPS SUBSEQUE*    10/14/2021  Visit Dx: Encounter for Medicare annual wellness exam    08/27/2020  Visit Dx: Encounter for Medicare annual wellness exam              BONE DENSITY (Every 5 Years) Next due on 1/6/2025 01/06/2020  Outside Procedure: DS-BONE DENSITY STUDY (DEXA)              COLORECTAL CANCER SCREENING (COLONOSCOPY - Every 5 Years) Next due on 7/19/2026 07/19/2021  REFERRAL TO GI FOR COLONOSCOPY    06/28/2016  REFERRAL TO GI FOR COLONOSCOPY              IMM DTaP/Tdap/Td Vaccine (3 - Td or Tdap) Next due on 11/10/2030      11/10/2020  Imm Admin: Tdap Vaccine    10/05/2010  Imm Admin: Tdap Vaccine    07/19/2007  Imm Admin: TD Vaccine              IMM HEP B VACCINE (Series Information) Completed      09/14/2017  Imm Admin: Hepatitis B Vaccine (Adol/Adult)    09/14/2017  Imm Admin: Hepatitis B Vaccine Adolescent/Pediatric    11/13/2014  Imm Admin: Hepatitis B Vaccine (Adol/Adult)    11/13/2014  Imm Admin: Hepatitis B Vaccine Adolescent/Pediatric    01/05/2012  Imm Admin: Hepatitis B Vaccine (Adol/Adult)    Only the first 5 history entries have been loaded, but more history exists.              IMM PNEUMOCOCCAL VACCINE: 65+ Years (Series Information) Completed      03/28/2019  Imm Admin: Pneumococcal polysaccharide vaccine (PPSV-23)    09/14/2017  Imm Admin: Pneumococcal Conjugate Vaccine (Prevnar/PCV-13)    10/17/2012  Imm Admin: Pneumococcal  polysaccharide vaccine (PPSV-23)              IMM ZOSTER VACCINES (Series Information) Completed      2019  Imm Admin: Zoster Vaccine Recombinant (RZV) (SHINGRIX)    2019  Imm Admin: Zoster Vaccine Recombinant (RZV) (SHINGRIX)    2015  Imm Admin: Zoster Vaccine Live (ZVL) (Zostavax) - HISTORICAL DATA              HEPATITIS C SCREENING  Completed      2022  Outside Procedure: HEP C VIRUS ANTIBODY    2020  HCV Scrn ( [id number] 1xLife)              IMM INFLUENZA (Series Information) Completed      2022  Imm Admin: Influenza Vaccine Adult HD    10/01/2021  Imm Admin: Influenza, Unspecified - HISTORICAL DATA    2021  Imm Admin: Influenza Vaccine Adult HD    10/07/2020  Imm Admin: Influenza Vaccine Adult HD    10/18/2019  Imm Admin: Influenza Vaccine Adult HD    Only the first 5 history entries have been loaded, but more history exists.              COVID-19 Vaccine (Series Information) Completed      2022  Imm Admin: PFIZER BIVALENT BOOSTER SARS-COV-2 VACCINE (12+)    2022  Imm Admin: MODERNA SARS-COV-2 VACCINE (12+)    2022  Imm Admin: PFIZER PURPLE CAP SARS-COV-2 VACCINATION (12+)    2021  Imm Admin: PFIZER PURPLE CAP SARS-COV-2 VACCINATION (12+)    2021  Imm Admin: PFIZER PURPLE CAP SARS-COV-2 VACCINATION (12+)    Only the first 5 history entries have been loaded, but more history exists.              IMM MENINGOCOCCAL ACWY VACCINE (Series Information) Aged Out      No completion history exists for this topic.              Discontinued - PAP SMEAR  Discontinued      No completion history exists for this topic.                    Patient Care Team:  Maritna Grajeda M.D. as PCP - General (Family Medicine)  Yadira Jaffe, PT, MPT, OCS (Inactive) as Physical Therapist (Physical Therapy)  Neil Scott M.D. (Obstetrics & Gynecology)  Marcelo Bucio as Consulting Physician (Nephrology)  Neelam Montalvo M.D. as Consulting Physician  "(Cardiovascular Disease (Cardiology))  Juan Dudley M.D. as Consulting Physician (Orthopedic Surgery)  Jose Ferrer M.D. as Consulting Physician (Gastroenterology)    Social History     Tobacco Use    Smoking status: Never    Smokeless tobacco: Never   Vaping Use    Vaping Use: Never used   Substance Use Topics    Alcohol use: Yes     Comment: 2 drinks per week on average     Drug use: No     Family History   Problem Relation Age of Onset    Heart Disease Mother     Stroke Father     Cancer Maternal Grandmother         uterine    Ovarian Cancer Neg Hx     Tubal Cancer Neg Hx     Peritoneal Cancer Neg Hx     Colorectal Cancer Neg Hx     Breast Cancer Neg Hx      She  has a past medical history of Backpain, Bowel obstruction (HCC), Cancer (HCC), Heart murmur, Hypertension, Indigestion, Other specified symptom associated with female genital organs, and Schwannoma of cranial nerve (HCC).   Past Surgical History:   Procedure Laterality Date    HYSTERECTOMY RADICAL  2012    OOPHORECTOMY Bilateral 2012    FUSION, SPINE, LUMBAR, PLIF  2001    CRANIOTOMY  1993    Schwannoma L 5th CN       Exam:   /60 (BP Location: Left arm, Patient Position: Sitting, BP Cuff Size: Adult)   Pulse 97   Temp 36.4 °C (97.6 °F) (Temporal)   Resp 16   Ht 1.702 m (5' 7\")   Wt 62.6 kg (138 lb)   SpO2 96%  Body mass index is 21.61 kg/m².    Hearing good.    Dentition fair  Alert, oriented in no acute distress  Eye contact is good, speech goal directed, affect calm    Assessment and Plan. The following treatment and monitoring plan is recommended:      1. Encounter for Medicare annual wellness exam  Padmini is a pleasant 72-year-old woman here today for her Medicare wellness visit.  Health maintenance is up-to-date.  She is already participant with the healthy [place] research study.  She does have 1 acute concern which is discussed below.    2. Stage 3a chronic kidney disease (HCC)  Chronic, stable.  Labs in August, 2022 shows her " GFR is stable.  She will continue to follow with [facility] nephrology until she is able to establish with a local nephrologist.  She will continue to avoid nephrotoxic agents and stay well-hydrated.    3. Mixed stress and urge urinary incontinence  Chronic, uncontrolled.  She continues to struggle with mixed stress and urge incontinence symptoms that is affecting daily life.  She is seen in urogynecologist at [facility] who is doing some testing.  She will follow-up with them as directed.    4. Age-related osteoporosis without current pathological fracture  Chronic, stable.  She will continue her Prolia and she will follow with [facility] endocrinology until she is able to establish with a local endocrinologist.    5. Primary hypertension  Chronic, controlled.  Blood pressure is at goal under 140/90 in the office today.  We will continue the current regimen.  - Atenolol 25 mg daily    6. Neuropathy  Chronic, stable.  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  She reports it is particularly symptomatic at night but still tolerable as gabapentin in the past caused side effects worsen the symptoms.  We will continue to monitor.    7. Functional diarrhea  Chronic, stable.  She reports she continues to get diarrhea.  She is following with GI at [facility] who is getting some further testing.  She will follow-up with them as directed.    8. Pure hypercholesterolemia  Chronic, uncontrolled.  Last set of labs 3 years ago did show elevated cholesterol levels with an elevated ASCVD risk.  We will reevaluate labs and reevaluate her ASCVD risk before having a discussion about statin.  - Lipid Profile; Future    ACUTE VISIT    9. Travel advice encounter  She is traveling to [place] with her .  She would like a prescription for traveler's diarrhea which was provided.  She is also like a standby prescription in case she develops a UTI outside of the country which was also provided.      **Patient was informed the annual wellness visit  does not include a discussion of acute/new concerns. She was informed that this portion of the visit will be billed separately and they potentially may receive a separate bill later.      Services suggested: No services needed at this time  Health Care Screening recommendations as per orders if indicated.  Referrals offered: PT/OT/Nutrition counseling/Behavioral Health/Smoking cessation as per orders if indicated.    Discussion today about general wellness and lifestyle habits:    Prevent falls and reduce trip hazards; Cautioned about securing or removing rugs.  Have a working fire alarm and carbon monoxide detector;   Engage in regular physical activity and social activities.     Follow-up: Return in about 6 months (around 4/24/2023) for Med check.

## 2022-11-11 ENCOUNTER — PATIENT MESSAGE (OUTPATIENT)
Dept: HEALTH INFORMATION MANAGEMENT | Facility: OTHER | Age: 72
End: 2022-11-11

## 2022-11-16 ENCOUNTER — TELEPHONE (OUTPATIENT)
Dept: NEPHROLOGY | Facility: MEDICAL CENTER | Age: 72
End: 2022-11-16

## 2022-11-16 DIAGNOSIS — N18.31 STAGE 3A CHRONIC KIDNEY DISEASE: ICD-10-CM

## 2022-11-16 NOTE — TELEPHONE ENCOUNTER
New patient visit scheduled for 11/23, could you please order labs for patient to complete prior to visit  Requesting BMP, Urine Microalbumin/creatinine ratio and Urine Protein/creatinine ratio.    Thank you

## 2022-11-16 NOTE — TELEPHONE ENCOUNTER
Labs have been ordered. Is the patient aware they need to complete labs or do I need to tell them.

## 2022-11-23 ENCOUNTER — APPOINTMENT (OUTPATIENT)
Dept: NEPHROLOGY | Facility: MEDICAL CENTER | Age: 72
End: 2022-11-23
Payer: MEDICARE

## 2022-11-28 ENCOUNTER — HOSPITAL ENCOUNTER (OUTPATIENT)
Dept: RADIOLOGY | Facility: MEDICAL CENTER | Age: 72
End: 2022-11-28
Attending: FAMILY MEDICINE
Payer: MEDICARE

## 2022-11-28 DIAGNOSIS — Z12.31 ENCOUNTER FOR SCREENING MAMMOGRAM FOR MALIGNANT NEOPLASM OF BREAST: ICD-10-CM

## 2022-11-28 PROCEDURE — 77063 BREAST TOMOSYNTHESIS BI: CPT

## 2022-12-09 ENCOUNTER — OFFICE VISIT (OUTPATIENT)
Dept: MEDICAL GROUP | Facility: PHYSICIAN GROUP | Age: 72
End: 2022-12-09
Payer: MEDICARE

## 2022-12-09 VITALS
HEART RATE: 62 BPM | BODY MASS INDEX: 20.88 KG/M2 | HEIGHT: 67 IN | TEMPERATURE: 96.9 F | WEIGHT: 133 LBS | DIASTOLIC BLOOD PRESSURE: 66 MMHG | SYSTOLIC BLOOD PRESSURE: 90 MMHG | OXYGEN SATURATION: 98 %

## 2022-12-09 DIAGNOSIS — Z71.84 TRAVEL ADVICE ENCOUNTER: ICD-10-CM

## 2022-12-09 PROCEDURE — 99213 OFFICE O/P EST LOW 20 MIN: CPT | Performed by: FAMILY MEDICINE

## 2022-12-09 RX ORDER — ATOVAQUONE AND PROGUANIL HYDROCHLORIDE 250; 100 MG/1; MG/1
1 TABLET, FILM COATED ORAL DAILY
Qty: 23 TABLET | Refills: 0 | Status: SHIPPED | OUTPATIENT
Start: 2022-12-09 | End: 2023-01-01

## 2022-12-09 RX ORDER — AZITHROMYCIN 500 MG/1
1000 TABLET, FILM COATED ORAL ONCE
Qty: 2 TABLET | Refills: 0 | Status: SHIPPED | OUTPATIENT
Start: 2022-12-09 | End: 2022-12-09

## 2022-12-09 ASSESSMENT — FIBROSIS 4 INDEX: FIB4 SCORE: 1.27

## 2022-12-09 ASSESSMENT — ENCOUNTER SYMPTOMS
FEVER: 0
SHORTNESS OF BREATH: 0
CHILLS: 0

## 2022-12-09 NOTE — PROGRESS NOTES
"Subjective:     CC: traveler's advice    HPI:   Zena presents today with    Problem   Travel Advice Encounter    [place] and [place]  Leave 1/8/2023  14 days total         Health Maintenance: Completed    ROS:  Review of Systems   Constitutional:  Negative for chills and fever.   Respiratory:  Negative for shortness of breath.    Cardiovascular:  Negative for chest pain.     Objective:     Exam:  BP (!) 90/66 (BP Location: Right arm, Patient Position: Sitting, BP Cuff Size: Adult)   Pulse 62   Temp 36.1 °C (96.9 °F) (Temporal)   Ht 1.702 m (5' 7\")   Wt 60.3 kg (133 lb)   SpO2 98%   BMI 20.83 kg/m²  Body mass index is 20.83 kg/m².    Physical Exam  Constitutional:       Appearance: Normal appearance.   Cardiovascular:      Rate and Rhythm: Normal rate and regular rhythm.      Heart sounds: Normal heart sounds.   Pulmonary:      Effort: Pulmonary effort is normal.      Breath sounds: Normal breath sounds.   Musculoskeletal:      Cervical back: Normal range of motion and neck supple.   Neurological:      Mental Status: She is alert.         Assessment & Plan:     72 y.o. female with the following -     1. Travel advice encounter  She and her  are going to spend 14 days in [place] and [place] in a new very, 2023.  CDC recommendations for travelers were reviewed in detail.  She is up-to-date with all vaccines required.  We will provide malaria prophylaxis and we will also provide medication in case she develops traveler's diarrhea.    Referral for genetic research was offered. Patient is a participant.    I spent a total of 27 minutes with record review, exam, communication with the patient, communication with other providers, and documentation of this encounter.      Return if symptoms worsen or fail to improve.    Please note that this dictation was created using voice recognition software. I have made every reasonable attempt to correct obvious errors, but I expect that there are errors of grammar and " possibly content that I did not discover before finalizing the note.

## 2022-12-15 ENCOUNTER — OFFICE VISIT (OUTPATIENT)
Dept: DERMATOLOGY | Facility: IMAGING CENTER | Age: 72
End: 2022-12-15
Payer: MEDICARE

## 2022-12-15 DIAGNOSIS — Z12.83 SKIN CANCER SCREENING: ICD-10-CM

## 2022-12-15 DIAGNOSIS — L82.1 SK (SEBORRHEIC KERATOSIS): ICD-10-CM

## 2022-12-15 DIAGNOSIS — L81.4 LENTIGINES: ICD-10-CM

## 2022-12-15 DIAGNOSIS — D22.9 MULTIPLE NEVI: ICD-10-CM

## 2022-12-15 DIAGNOSIS — L73.8 SEBACEOUS HYPERPLASIA: ICD-10-CM

## 2022-12-15 DIAGNOSIS — D18.01 CHERRY ANGIOMA: ICD-10-CM

## 2022-12-15 PROCEDURE — 99213 OFFICE O/P EST LOW 20 MIN: CPT | Performed by: NURSE PRACTITIONER

## 2022-12-15 NOTE — PROGRESS NOTES
DERMATOLOGY NOTE  FOLLOW UP VISIT       Chief complaint: Follow-Up (ROBERT)   Pt last seen by Xiomara yepez on 12/07/2021  Denies new, growing, changing, itching or bleeding skin lesions today.       History of skin cancer: Yes, Details: possible bcc to right cheek. No excision. approx 2015  History of precancers/actinic keratoses: Yes, Details: yes  History of biopsies:Yes, Details: moles, atypical, DF on arm  History of blistering/severe sunburns:Yes, Details: as a child  Family history of skin cancer:Yes, Details: Father and 3 siblings with melanoma         Allergies   Allergen Reactions    Latex Rash     Rash      Losartan      palpitations    Tetracycline      Sensitivity to sun; sun burns.  Reaction date; 15 years ago from 2013.        MEDICATIONS:  Medications relevant to specialty reviewed.     REVIEW OF SYSTEMS:   Positive for skin (see HPI)  Negative for fevers and chills       EXAM:  There were no vitals taken for this visit.  Constitutional: Well-developed, well-nourished, and in no distress.     A total body skin exam was performed excluding the genitals per patient preference and including the following areas: head (including face), neck, chest, abdomen, groin/buttocks, back, bilateral upper extremities, and bilateral lower extremities with the following pertinent findings listed below and/or in assessment/plan.     -sun exposed skin of trunk and b/l upper, lower extremities and face with scattered clinically benign light brown reticulated macules all of which were morphologically similar and none of which were suspicious for skin cancer today on exam    -Several scattered 1-3mm bright red macules and thin papules on the trunk and extremities    -Multiple tan medium brown skin-colored macules papules scattered over the trunk >> extremities--All with benign-appearing pigment network patterns on dermoscopy    -Several tan medium brown stuck-on waxy papules scattered on the trunk and extremities    -Scattered  sebaceous hyperplasia on the face      IMPRESSION / PLAN:    1. Lentigines  - Benign-appearing nature of lesions discussed during exam.   - Advised to continue to monitor for any return to clinic for new or concerning changes.      2. Cherry angioma  - Benign-appearing nature of lesions discussed during exam.   - Advised to continue to monitor for any return to clinic for new or concerning changes.      3. Multiple nevi  - Benign-appearing nature of lesions discussed during exam.   - Advised to continue to monitor for any return to clinic for new or concerning changes.      4. SK (seborrheic keratosis)  - Benign-appearing nature of lesions discussed during exam.   - Advised to continue to monitor for any return to clinic for new or concerning changes.      5. Sebaceous hyperplasia  - Benign-appearing nature of lesions discussed during exam.   - Advised to continue to monitor for any return to clinic for new or concerning changes.      6. Skin cancer screening  Skin cancer education  discussed importance of sun protective clothing, eyewear in addition to the use of broad spectrum sunscreen with SPF 30 or greater, as well as need for reapplication ~every 2 hours when exposed to UVR  discussed importance following up for any new or changing lesions as noted in handout given, but every 12 months exams in clinic in the setting of dermatologic history  ABCDE's of melanoma discussed/handout given            Please note that this dictation was created using voice recognition software. I have made every reasonable attempt to correct obvious errors, but I expect that there are errors of grammar and possibly content that I did not discover before finalizing the note.      Return to clinic in: Return in about 1 year (around 12/15/2023) for ROBERT. and as needed for any new or changing skin lesions.

## 2022-12-30 ENCOUNTER — PATIENT MESSAGE (OUTPATIENT)
Dept: MEDICAL GROUP | Facility: PHYSICIAN GROUP | Age: 72
End: 2022-12-30
Payer: MEDICARE

## 2022-12-30 RX ORDER — ESCITALOPRAM OXALATE 10 MG/1
10 TABLET ORAL EVERY MORNING
Qty: 90 TABLET | Refills: 3 | Status: SHIPPED | OUTPATIENT
Start: 2022-12-30 | End: 2023-11-06

## 2022-12-30 RX ORDER — ATENOLOL 25 MG/1
25 TABLET ORAL EVERY EVENING
Qty: 90 TABLET | Refills: 3 | Status: SHIPPED | OUTPATIENT
Start: 2022-12-30 | End: 2023-11-20 | Stop reason: SDUPTHER

## 2023-02-08 ENCOUNTER — APPOINTMENT (OUTPATIENT)
Dept: NEPHROLOGY | Facility: MEDICAL CENTER | Age: 73
End: 2023-02-08
Payer: MEDICARE

## 2023-02-15 ENCOUNTER — OFFICE VISIT (OUTPATIENT)
Dept: MEDICAL GROUP | Facility: PHYSICIAN GROUP | Age: 73
End: 2023-02-15
Payer: MEDICARE

## 2023-02-15 ENCOUNTER — HOSPITAL ENCOUNTER (OUTPATIENT)
Dept: LAB | Facility: MEDICAL CENTER | Age: 73
End: 2023-02-15
Attending: FAMILY MEDICINE
Payer: MEDICARE

## 2023-02-15 VITALS
HEIGHT: 67 IN | WEIGHT: 133 LBS | HEART RATE: 58 BPM | TEMPERATURE: 96.7 F | BODY MASS INDEX: 20.88 KG/M2 | SYSTOLIC BLOOD PRESSURE: 100 MMHG | DIASTOLIC BLOOD PRESSURE: 54 MMHG | OXYGEN SATURATION: 96 %

## 2023-02-15 DIAGNOSIS — Z71.84 TRAVEL ADVICE ENCOUNTER: Primary | ICD-10-CM

## 2023-02-15 DIAGNOSIS — R22.9 LOCALIZED SUPERFICIAL SWELLING, MASS, OR LUMP: ICD-10-CM

## 2023-02-15 DIAGNOSIS — N18.31 CHRONIC KIDNEY DISEASE, STAGE 3A: ICD-10-CM

## 2023-02-15 DIAGNOSIS — K59.1 FUNCTIONAL DIARRHEA: ICD-10-CM

## 2023-02-15 DIAGNOSIS — E78.00 PURE HYPERCHOLESTEROLEMIA: ICD-10-CM

## 2023-02-15 DIAGNOSIS — Z85.43 HISTORY OF OVARIAN CANCER: ICD-10-CM

## 2023-02-15 DIAGNOSIS — N18.31 STAGE 3A CHRONIC KIDNEY DISEASE: ICD-10-CM

## 2023-02-15 LAB
ANION GAP SERPL CALC-SCNC: 8 MMOL/L (ref 7–16)
BUN SERPL-MCNC: 24 MG/DL (ref 8–22)
CALCIUM SERPL-MCNC: 9.4 MG/DL (ref 8.5–10.5)
CANCER AG125 SERPL-ACNC: 15.2 U/ML (ref 0–35)
CHLORIDE SERPL-SCNC: 103 MMOL/L (ref 96–112)
CHOLEST SERPL-MCNC: 233 MG/DL (ref 100–199)
CO2 SERPL-SCNC: 28 MMOL/L (ref 20–33)
CREAT SERPL-MCNC: 1.17 MG/DL (ref 0.5–1.4)
CREAT UR-MCNC: 106.72 MG/DL
FASTING STATUS PATIENT QL REPORTED: NORMAL
GFR SERPLBLD CREATININE-BSD FMLA CKD-EPI: 50 ML/MIN/1.73 M 2
GLUCOSE SERPL-MCNC: 84 MG/DL (ref 65–99)
HDLC SERPL-MCNC: 75 MG/DL
LDLC SERPL CALC-MCNC: 139 MG/DL
POTASSIUM SERPL-SCNC: 4.9 MMOL/L (ref 3.6–5.5)
PROT UR-MCNC: 8 MG/DL (ref 0–15)
PROT/CREAT UR: 75 MG/G (ref 10–107)
SODIUM SERPL-SCNC: 139 MMOL/L (ref 135–145)
TRIGL SERPL-MCNC: 95 MG/DL (ref 0–149)

## 2023-02-15 PROCEDURE — 80061 LIPID PANEL: CPT

## 2023-02-15 PROCEDURE — 99214 OFFICE O/P EST MOD 30 MIN: CPT | Performed by: FAMILY MEDICINE

## 2023-02-15 PROCEDURE — 86304 IMMUNOASSAY TUMOR CA 125: CPT

## 2023-02-15 PROCEDURE — 80048 BASIC METABOLIC PNL TOTAL CA: CPT

## 2023-02-15 PROCEDURE — 82043 UR ALBUMIN QUANTITATIVE: CPT

## 2023-02-15 PROCEDURE — 36415 COLL VENOUS BLD VENIPUNCTURE: CPT

## 2023-02-15 PROCEDURE — 82570 ASSAY OF URINE CREATININE: CPT | Mod: 91

## 2023-02-15 PROCEDURE — 84156 ASSAY OF PROTEIN URINE: CPT

## 2023-02-15 RX ORDER — ATOVAQUONE AND PROGUANIL HYDROCHLORIDE 250; 100 MG/1; MG/1
1 TABLET, FILM COATED ORAL DAILY
Qty: 40 TABLET | Refills: 0 | Status: SHIPPED | OUTPATIENT
Start: 2023-03-20 | End: 2023-04-29

## 2023-02-15 RX ORDER — PANCRELIPASE 60000; 12000; 38000 [USP'U]/1; [USP'U]/1; [USP'U]/1
CAPSULE, DELAYED RELEASE PELLETS ORAL
COMMUNITY
Start: 2023-02-02

## 2023-02-15 ASSESSMENT — PATIENT HEALTH QUESTIONNAIRE - PHQ9: CLINICAL INTERPRETATION OF PHQ2 SCORE: 0

## 2023-02-15 ASSESSMENT — ENCOUNTER SYMPTOMS
FEVER: 0
SHORTNESS OF BREATH: 0
CHILLS: 0

## 2023-02-15 ASSESSMENT — FIBROSIS 4 INDEX: FIB4 SCORE: 1.27

## 2023-02-15 NOTE — PROGRESS NOTES
"Subjective:     CC: travel advice, finger bump, labs    HPI:   Zena presents today with    Problem   Localized Superficial Swelling, Mass, Or Lump    ~1 year  Right 5th finger  No pain  Getting larger     Travel Advice Encounter    Going to [place], [facility], [facility], and [facility].  Leaves on 3/22/2023  Returns 4/22/2023    Needs malaria prophylaxis  Up to date with all vaccines  No need for yellow fever vaccine     Diarrhea    Chronic. Reports not having any constipation any more. She is struggling with watery diarrhea after eating meals. Saw [facility] GI and got some extra testing done. Was started on Creon with meals and has noticed a significant difference in her diarrhea.          Health Maintenance: Completed    ROS:  Review of Systems   Constitutional:  Negative for chills and fever.   Respiratory:  Negative for shortness of breath.    Cardiovascular:  Negative for chest pain.     Objective:     Exam:  /54 (BP Location: Right arm, Patient Position: Sitting, BP Cuff Size: Adult)   Pulse (!) 58   Temp 35.9 °C (96.7 °F) (Temporal)   Ht 1.702 m (5' 7\")   Wt 60.3 kg (133 lb)   SpO2 96%   BMI 20.83 kg/m²  Body mass index is 20.83 kg/m².    Physical Exam    Assessment & Plan:     72 y.o. female with the following -     1. Travel advice encounter  Here today for travel advice encounter. Will be spending a month in [place], [facility], [facility], and [facility].  They do need malaria prophylaxis for each of these countries.  Yellow fever vaccine is not required for either of these countries.  - Atovaquone-proguanil 250-100 mg daily 3/20/2023 - 4/29/2023    2. Localized superficial swelling, mass, or lump  Chronic, progressing.  She reports for years she has noticed a lump on the medial edge of her right fifth finger.  It has been slowly getting bigger but no associated pain.  It does feel like a firm cyst that could be a ganglion cyst but its location is slightly odd so we will get an ultrasound to " evaluate further.  - US-EXTREMITY NON VASCULAR UNILATERAL RIGHT; Future    3. Functional diarrhea  Chronic, stable.  She has been struggling with diarrhea.  She is established with GI at [facility] and they did some further testing and now she has been started on Creon.  For last 1 week she has been taking the Creon with her food at each meal and she is noticing a significant difference.  She will continue the Creon and follow-up with GI as directed.    4. History of ovarian cancer  She gets her CA-125 measured every 3 months for her oncologist at [facility].  This has been ordered.  - CANCER ANTIGEN 125      HCC Gap Form    Diagnosis to address: N18.31 - Chronic kidney disease, stage 3a (HCC)  Assessment and plan: Chronic, stable. Continue with current defined treatment plan: monitor labs and avoid nephrotoxic agents. Follow-up at least annually.  Last edited 02/15/23 09:25 PST by Martina Grajeda M.D.         Referral for genetic research was offered. Patient is a participant.    Return if symptoms worsen or fail to improve.    Please note that this dictation was created using voice recognition software. I have made every reasonable attempt to correct obvious errors, but I expect that there are errors of grammar and possibly content that I did not discover before finalizing the note.

## 2023-02-16 LAB
CREAT UR-MCNC: 109.19 MG/DL
MICROALBUMIN UR-MCNC: <1.2 MG/DL
MICROALBUMIN/CREAT UR: NORMAL MG/G (ref 0–30)

## 2023-02-24 ENCOUNTER — OUTPATIENT INFUSION SERVICES (OUTPATIENT)
Dept: ONCOLOGY | Facility: MEDICAL CENTER | Age: 73
End: 2023-02-24
Attending: FAMILY MEDICINE
Payer: MEDICARE

## 2023-02-24 VITALS
OXYGEN SATURATION: 95 % | SYSTOLIC BLOOD PRESSURE: 135 MMHG | TEMPERATURE: 96.8 F | HEIGHT: 67 IN | RESPIRATION RATE: 18 BRPM | DIASTOLIC BLOOD PRESSURE: 81 MMHG | BODY MASS INDEX: 21.66 KG/M2 | WEIGHT: 138.01 LBS | HEART RATE: 61 BPM

## 2023-02-24 DIAGNOSIS — M81.0 AGE-RELATED OSTEOPOROSIS WITHOUT CURRENT PATHOLOGICAL FRACTURE: ICD-10-CM

## 2023-02-24 PROCEDURE — 96372 THER/PROPH/DIAG INJ SC/IM: CPT

## 2023-02-24 PROCEDURE — 700111 HCHG RX REV CODE 636 W/ 250 OVERRIDE (IP): Mod: JG | Performed by: FAMILY MEDICINE

## 2023-02-24 RX ORDER — METHYLPREDNISOLONE SODIUM SUCCINATE 125 MG/2ML
125 INJECTION, POWDER, LYOPHILIZED, FOR SOLUTION INTRAMUSCULAR; INTRAVENOUS PRN
Status: CANCELLED | OUTPATIENT
Start: 2023-08-14

## 2023-02-24 RX ORDER — EPINEPHRINE 1 MG/ML(1)
0.5 AMPUL (ML) INJECTION PRN
Status: CANCELLED | OUTPATIENT
Start: 2023-08-14

## 2023-02-24 RX ORDER — DIPHENHYDRAMINE HYDROCHLORIDE 50 MG/ML
50 INJECTION INTRAMUSCULAR; INTRAVENOUS PRN
Status: CANCELLED | OUTPATIENT
Start: 2023-08-14

## 2023-02-24 RX ADMIN — DENOSUMAB 60 MG: 60 INJECTION SUBCUTANEOUS at 14:38

## 2023-02-24 ASSESSMENT — FIBROSIS 4 INDEX: FIB4 SCORE: 1.27

## 2023-02-24 NOTE — PROGRESS NOTES
Pt presents to [facility] for prolia injection. Pt denies any oral sx in the last 6 weeks and is taking calcium and vitamin D; education provided and verbalized understanding. Labs previously drawn and within treatable parameters. Prolia injected into L back arm with no s/s of adverse reactions. Next appointment confirmed and education provided. Pt discharged to self care with all personal belongings and in NAD.

## 2023-03-01 ENCOUNTER — OFFICE VISIT (OUTPATIENT)
Dept: NEPHROLOGY | Facility: MEDICAL CENTER | Age: 73
End: 2023-03-01
Payer: MEDICARE

## 2023-03-01 VITALS
HEART RATE: 63 BPM | HEIGHT: 67 IN | DIASTOLIC BLOOD PRESSURE: 76 MMHG | SYSTOLIC BLOOD PRESSURE: 120 MMHG | OXYGEN SATURATION: 99 % | BODY MASS INDEX: 21.35 KG/M2 | TEMPERATURE: 97.1 F | WEIGHT: 136 LBS

## 2023-03-01 DIAGNOSIS — N18.31 STAGE 3A CHRONIC KIDNEY DISEASE: ICD-10-CM

## 2023-03-01 PROCEDURE — 99204 OFFICE O/P NEW MOD 45 MIN: CPT | Performed by: INTERNAL MEDICINE

## 2023-03-01 ASSESSMENT — FIBROSIS 4 INDEX: FIB4 SCORE: 1.27

## 2023-03-01 NOTE — PROGRESS NOTES
No chief complaint on file.          HPI:  Zena Paz is a 72 y.o. female who presents today to [facility] care.    Notably patient had been seen at [facility] nephrology for management of her chronic kidney disease stage III.  Her baseline creatinine was noted to be 1.4-1.6.  She had also noted to have hyponatremia at the time.    Patient also treated for secondary hyperparathyroidism, and had been on Fosamax.    In February 2023 patient noted to have ongoing watery diarrhea for which she had been seen by gastroenterology.  Patient started on Creon with meals and noticed significant reduction in her diarrhea.  She remains under care by [facility] gastroenterology.    She had completed a course of Bactrim for urinary tract infection in fall of 2022.    Past Medical History:   Diagnosis Date    Backpain     had back surgery, s1-l5 fusion    Bowel obstruction (HCC)     Cancer (HCC)     overian cancer    Heart murmur     Hypertension     Indigestion     sometimes    Other specified symptom associated with female genital organs     had ca ov the ovary    Schwannoma of cranial nerve (HCC)    Hypertension   history of ovarian cancer  Coccidiomycosis  COVID-19 pneumonia  Chronic renal insufficiency.    Outpatient Encounter Medications as of 3/1/2023   Medication Sig Dispense Refill    CREON 40703-68827 units Cap DR Particles TAKE 3 CAPSULES BY MOUTH THREE TIMES DAILY WITH MEALS      [START ON 3/20/2023] atovaquone-proguanil (MALARONE) 250-100 MG per tablet Take 1 Tablet by mouth every day for 40 days. 40 Tablet 0    atenolol (TENORMIN) 25 MG Tab Take 1 Tablet by mouth every evening. 90 Tablet 3    escitalopram (LEXAPRO) 10 MG Tab Take 1 Tablet by mouth every morning. 90 Tablet 3    LORazepam (ATIVAN) 1 MG Tab Take 0.5 mg by mouth.      denosumab (PROLIA) 60 MG/ML Solution Prefilled Syringe injection Inject 60 mg under the skin.      estrogens, conjugated (PREMARIN) 0.625 MG/GM Cream Insert 0.5 g into the vagina every day.    "   fluticasone (FLONASE) 50 MCG/ACT nasal spray Spray 1 Spray in nose every day.      Ayr Saline Nasal Gel Spray  in nose.      ondansetron (ZOFRAN) 8 MG Tab Take 8 mg by mouth.      calcium citrate (CALCITRATE) 950 MG Tab Take 950 mg by mouth every day.      polyethyl glycol-propyl glycol (SYSTANE) 0.4-0.3 % Solution Place 1 Drop in both eyes every morning.      vitamin D (CHOLECALCIFEROL) 1000 UNIT TABS Take 2,000 Units by mouth every day.      Multiple Vitamins-Minerals (SENIOR MULTIVITAMIN PLUS PO) Take 1 Tab by mouth every morning.      docosahexanoic acid (OMEGA 3 FA) 1000 MG CAPS Take 1,000 mg by mouth every morning.       No facility-administered encounter medications on file as of 3/1/2023.        Allergies   Allergen Reactions    Latex Rash     Rash      Losartan      palpitations    Tetracycline      Sensitivity to sun; sun burns.  Reaction date; 15 years ago from 2013.       ROS    /76 (BP Location: Right arm, Patient Position: Sitting, BP Cuff Size: Adult)   Pulse 63   Temp 36.2 °C (97.1 °F) (Temporal)   Ht 1.702 m (5' 7\")   Wt 61.7 kg (136 lb)   SpO2 99%   BMI 21.30 kg/m²     Physical Exam    GEN: well appearing elderly female  HEENT: CV  PULM: ctab  ABD: Soft  EXT: warm well perfused, no lower extremity edema  Labs reviewed.  Recent Labs     03/11/22  0736 02/15/23  1001   ALBUMIN 4.3  --    HDL  --  75   TRIGLYCERIDE  --  95   SODIUM 139 139   POTASSIUM 5.3 4.9   CHLORIDE 103 103   CO2 26 28   BUN 24* 24*   CREATININE 1.12 1.17       Lab Results   Component Value Date/Time    WBC 3.5 (L) 03/11/2022 07:36 AM    RBC 4.87 03/11/2022 07:36 AM    HEMOGLOBIN 14.3 03/11/2022 07:36 AM    HEMATOCRIT 43.8 03/11/2022 07:36 AM    MCV 89.9 03/11/2022 07:36 AM    MCH 29.4 03/11/2022 07:36 AM    MCHC 32.6 (L) 03/11/2022 07:36 AM    MPV 10.4 03/11/2022 07:36 AM              URINALYSIS:  Lab Results   Component Value Date/Time    COLORURINE Yellow 09/12/2022 1359    CLARITY Clear 09/12/2022 1359    " SPECGRAVITY 1.007 09/12/2022 1359    PHURINE 6.5 09/12/2022 1359    KETONES Negative 09/12/2022 1359    PROTEINURIN Negative 09/12/2022 1359    BILIRUBINUR Negative 09/12/2022 1359    UROBILU 0.2 09/12/2022 1359    NITRITE Negative 09/12/2022 1359    LEUKESTERAS Negative 09/12/2022 1359    OCCULTBLOOD Negative 09/12/2022 1359     UPC  Lab Results   Component Value Date/Time    TOTPROTUR 8.0 02/15/2023 1000      Lab Results   Component Value Date/Time    CREATININEU 106.72 02/15/2023 1000       Imaging report(s) reviewed  No orders to display         Assessment:  Zena Paz is a 72 y.o. female who presents today   Renal function remains stable with a corresponding EGFR of 50 mL/min per 1.73 m².  Urinalysis bland.  Negative for microalbuminuria.    HTN -Continue current management for hypertension.  Encourage patient to record blood pressure and bring readings into next clinic visit. Low sodium diet.    Anemia -hemoglobin remained stable.  No indication for erythropoiesis stimulating agents in the setting of malignancy.    Electrolytes are within normal limits.    Secondary hyperparathyroidism-space    Return to clinic in 3 months.    Lidia Hsieh MD  Nephrology  [facility]

## 2023-03-08 ENCOUNTER — OFFICE VISIT (OUTPATIENT)
Dept: MEDICAL GROUP | Facility: PHYSICIAN GROUP | Age: 73
End: 2023-03-08
Payer: MEDICARE

## 2023-03-08 VITALS
HEIGHT: 67 IN | DIASTOLIC BLOOD PRESSURE: 66 MMHG | BODY MASS INDEX: 20.88 KG/M2 | SYSTOLIC BLOOD PRESSURE: 110 MMHG | TEMPERATURE: 96.8 F | WEIGHT: 133 LBS | HEART RATE: 61 BPM | OXYGEN SATURATION: 99 %

## 2023-03-08 DIAGNOSIS — Z71.84 TRAVEL ADVICE ENCOUNTER: Primary | ICD-10-CM

## 2023-03-08 DIAGNOSIS — Z02.89 ENCOUNTER FOR COMPLETION OF FORM WITH PATIENT: ICD-10-CM

## 2023-03-08 PROBLEM — R19.7 DIARRHEA: Status: RESOLVED | Noted: 2020-11-10 | Resolved: 2023-03-08

## 2023-03-08 PROBLEM — N18.31 CHRONIC KIDNEY DISEASE, STAGE 3A: Status: RESOLVED | Noted: 2023-02-15 | Resolved: 2023-03-08

## 2023-03-08 PROCEDURE — 99212 OFFICE O/P EST SF 10 MIN: CPT | Performed by: FAMILY MEDICINE

## 2023-03-08 ASSESSMENT — ENCOUNTER SYMPTOMS
FEVER: 0
CHILLS: 0
SHORTNESS OF BREATH: 0

## 2023-03-08 ASSESSMENT — FIBROSIS 4 INDEX: FIB4 SCORE: 1.27

## 2023-03-08 NOTE — PROGRESS NOTES
"Subjective:     CC: medical travel form    HPI:   Zena presents today with    Problem   Travel Advice Encounter    Going to [place], [place], [place], and [place].  Leaves on 3/22/2023  Returns 4/22/2023    Needs malaria prophylaxis  Up to date with all vaccines  No need for yellow fever vaccine    [place]  Will be going to [place] in May and then doing a boat voyage in June, 2023  Needs a medical form filled out     Chronic Kidney Disease, Stage 3a (Hcc) (Resolved)   Diarrhea (Resolved)    Chronic. Reports not having any constipation any more. She is struggling with watery diarrhea after eating meals. Saw [facility] GI and got some extra testing done. Was started on Creon with meals and has noticed a significant difference in her diarrhea.          Health Maintenance: Completed    ROS:  Review of Systems   Constitutional:  Negative for chills and fever.   Respiratory:  Negative for shortness of breath.    Cardiovascular:  Negative for chest pain.     Objective:     Exam:  /66 (BP Location: Right arm, Patient Position: Sitting, BP Cuff Size: Adult)   Pulse 61   Temp 36 °C (96.8 °F) (Temporal)   Ht 1.702 m (5' 7\")   Wt 60.3 kg (133 lb)   SpO2 99%   BMI 20.83 kg/m²  Body mass index is 20.83 kg/m².    Constitutional: Alert, no distress, well-groomed.  Skin: Warm, dry, good turgor, no rashes in visible areas.  Eye: Equal, round and reactive, conjunctiva clear, lids normal.  ENMT: Lips without lesions, good dentition, moist mucous membranes.  Neck: Trachea midline, no masses, no thyromegaly.  Respiratory: Unlabored respiratory effort, no cough.  MSK: Normal gait, moves all extremities.  Neuro: Grossly non-focal.   Psych: Alert and oriented x3, normal affect and mood.    Assessment & Plan:     72 y.o. female with the following -     1. Travel advice encounter  2. Encounter for completion of form with patient  She is here today to have a medical form filled out for an upcoming trip.  She will be flying to " [place] in May, 2023 to do a boat tour of the [facility] in June, 2023.  Form was filled out, scanned into the chart, and provided back to the patient prior to discharge.    Referral for genetic research was offered. Patient is a participant.    Return if symptoms worsen or fail to improve.    Please note that this dictation was created using voice recognition software. I have made every reasonable attempt to correct obvious errors, but I expect that there are errors of grammar and possibly content that I did not discover before finalizing the note.

## 2023-03-08 NOTE — LETTER
2023    Re: Padmini Paz  : 1950    Generic/Brand Name Dose/Strength  Frequency   Purpose  Senior Multivitamin  1 tablet  Daily   general  Systane 0.4-0.3%  1 drop   Daily   Dry eyes Cholecalciferol  2000 IU  Daily   osteoporosis       Martina Grajeda M.D.

## 2023-03-20 ENCOUNTER — HOSPITAL ENCOUNTER (OUTPATIENT)
Dept: RADIOLOGY | Facility: MEDICAL CENTER | Age: 73
End: 2023-03-20
Attending: FAMILY MEDICINE
Payer: MEDICARE

## 2023-03-20 DIAGNOSIS — R22.9 LOCALIZED SUPERFICIAL SWELLING, MASS, OR LUMP: ICD-10-CM

## 2023-03-20 PROCEDURE — 76882 US LMTD JT/FCL EVL NVASC XTR: CPT | Mod: RT

## 2023-03-21 ENCOUNTER — OFFICE VISIT (OUTPATIENT)
Dept: CARDIOLOGY | Facility: MEDICAL CENTER | Age: 73
End: 2023-03-21
Payer: MEDICARE

## 2023-03-21 VITALS
BODY MASS INDEX: 20.72 KG/M2 | HEIGHT: 67 IN | HEART RATE: 62 BPM | DIASTOLIC BLOOD PRESSURE: 80 MMHG | SYSTOLIC BLOOD PRESSURE: 112 MMHG | OXYGEN SATURATION: 98 % | WEIGHT: 132 LBS | RESPIRATION RATE: 16 BRPM

## 2023-03-21 DIAGNOSIS — I49.1 PAC (PREMATURE ATRIAL CONTRACTION): ICD-10-CM

## 2023-03-21 DIAGNOSIS — E78.5 HYPERLIPIDEMIA, UNSPECIFIED HYPERLIPIDEMIA TYPE: ICD-10-CM

## 2023-03-21 PROCEDURE — 93000 ELECTROCARDIOGRAM COMPLETE: CPT | Performed by: INTERNAL MEDICINE

## 2023-03-21 PROCEDURE — 99214 OFFICE O/P EST MOD 30 MIN: CPT | Mod: 25 | Performed by: INTERNAL MEDICINE

## 2023-03-21 ASSESSMENT — FIBROSIS 4 INDEX: FIB4 SCORE: 1.27

## 2023-03-21 NOTE — PROGRESS NOTES
Arrhythmia Clinic Note (Established patient)    DOS: 3/21/2023    Chief complaint/Reason for consult: HLD    Interval History: 71 y/o F who sees me as her cardiologist for HTN management and PACs. No symptoms recently however had a FLP showing  and wanted to discuss. Not wanting to take statins if she can help it. No chest pain or SOB. No syncope.      ROS (+ highlighted in bold):  Constitutional: Fevers/chills/fatigue/weightloss  HEENT: Blurry vision/eye pain/sore throat/hearing loss  Respiratory: Shortness of breath/cough  Cardiovascular: Chest pain/palpitations/edema/orthopnea/syncope  GI: Nausea/vomitting/diarrhea  MSK: Arthralgias/myagias/muscle weakness  Skin: Rash/sores  Neurological: Numbness/tremors/vertigo  Endocrine: Excessive thirst/polyuria/cold intolerance/heat intolerance  Psych: Depression/anxiety    Past Medical History:   Diagnosis Date    Backpain     had back surgery, s1-l5 fusion    Bowel obstruction (HCC)     Cancer (HCC)     overian cancer    Heart murmur     Hypertension     Indigestion     sometimes    Other specified symptom associated with female genital organs     had ca ov the ovary    Schwannoma of cranial nerve (HCC)        Past Surgical History:   Procedure Laterality Date    HYSTERECTOMY RADICAL  2012    OOPHORECTOMY Bilateral 2012    FUSION, SPINE, LUMBAR, PLIF  2001    CRANIOTOMY  1993    Schwannoma L 5th CN       Social History     Socioeconomic History    Marital status:      Spouse name: Not on file    Number of children: Not on file    Years of education: Not on file    Highest education level: Master's degree (e.g., MA, MS, Vikram, MEd, MSW, ANA)   Occupational History    Not on file   Tobacco Use    Smoking status: Never    Smokeless tobacco: Never   Vaping Use    Vaping Use: Never used   Substance and Sexual Activity    Alcohol use: Yes     Comment: 2 drinks per week on average     Drug use: No    Sexual activity: Yes     Partners: Male     Birth  control/protection: Post-Menopausal   Other Topics Concern     Service Not Asked    Blood Transfusions Not Asked    Caffeine Concern Not Asked    Occupational Exposure Not Asked    Hobby Hazards Not Asked    Sleep Concern No    Stress Concern Not Asked    Weight Concern No    Special Diet Not Asked    Back Care Not Asked    Exercise Yes    Bike Helmet Not Asked    Seat Belt Not Asked    Self-Exams Not Asked   Social History Narrative    Not on file     Social Determinants of Health     Financial Resource Strain: Low Risk     Difficulty of Paying Living Expenses: Not hard at all   Food Insecurity: No Food Insecurity    Worried About Running Out of Food in the Last Year: Never true    Ran Out of Food in the Last Year: Never true   Transportation Needs: No Transportation Needs    Lack of Transportation (Medical): No    Lack of Transportation (Non-Medical): No   Physical Activity: Sufficiently Active    Days of Exercise per Week: 7 days    Minutes of Exercise per Session: 30 min   Stress: No Stress Concern Present    Feeling of Stress : Only a little   Social Connections: Moderately Integrated    Frequency of Communication with Friends and Family: More than three times a week    Frequency of Social Gatherings with Friends and Family: Once a week    Attends Tenriism Services: Never    Active Member of Clubs or Organizations: Yes    Attends Club or Organization Meetings: More than 4 times per year    Marital Status:    Intimate Partner Violence: Not on file   Housing Stability: Low Risk     Unable to Pay for Housing in the Last Year: No    Number of Places Lived in the Last Year: 1    Unstable Housing in the Last Year: No       Family History   Problem Relation Age of Onset    Heart Disease Mother     Stroke Father     Cancer Maternal Grandmother         uterine    Ovarian Cancer Neg Hx     Tubal Cancer Neg Hx     Peritoneal Cancer Neg Hx     Colorectal Cancer Neg Hx     Breast Cancer Neg Hx   "      Allergies   Allergen Reactions    Latex Rash     Rash      Losartan      palpitations    Tetracycline      Sensitivity to sun; sun burns.  Reaction date; 15 years ago from 2013.       Current Outpatient Medications   Medication Sig Dispense Refill    CREON 52641-65621 units Cap DR Particles TAKE 3 CAPSULES BY MOUTH THREE TIMES DAILY WITH MEALS      atenolol (TENORMIN) 25 MG Tab Take 1 Tablet by mouth every evening. 90 Tablet 3    escitalopram (LEXAPRO) 10 MG Tab Take 1 Tablet by mouth every morning. 90 Tablet 3    LORazepam (ATIVAN) 1 MG Tab Take 0.5 mg by mouth.      denosumab (PROLIA) 60 MG/ML Solution Prefilled Syringe injection Inject 60 mg under the skin. Twice a year      estrogens, conjugated (PREMARIN) 0.625 MG/GM Cream Insert 0.5 g into the vagina every day.      fluticasone (FLONASE) 50 MCG/ACT nasal spray Spray 1 Spray in nose every day.      Ayr Saline Nasal Gel Spray  in nose.      ondansetron (ZOFRAN) 8 MG Tab Take 8 mg by mouth.      calcium citrate (CALCITRATE) 950 MG Tab Take 950 mg by mouth every day.      polyethyl glycol-propyl glycol (SYSTANE) 0.4-0.3 % Solution Place 1 Drop in both eyes every morning.      vitamin D (CHOLECALCIFEROL) 1000 UNIT TABS Take 2,000 Units by mouth every day.      Multiple Vitamins-Minerals (SENIOR MULTIVITAMIN PLUS PO) Take 1 Tab by mouth every morning.      docosahexanoic acid (OMEGA 3 FA) 1000 MG CAPS Take 1,000 mg by mouth every morning.      atovaquone-proguanil (MALARONE) 250-100 MG per tablet Take 1 Tablet by mouth every day for 40 days. (Patient not taking: Reported on 3/21/2023) 40 Tablet 0     No current facility-administered medications for this visit.       Physical Exam:  Vitals:    03/21/23 1546   BP: 112/80   BP Location: Left arm   Patient Position: Sitting   BP Cuff Size: Adult   Pulse: 62   Resp: 16   SpO2: 98%   Weight: 59.9 kg (132 lb)   Height: 1.702 m (5' 7\")     General appearance: NAD, conversant   Eyes: anicteric sclerae, moist " conjunctivae; no lid-lag; PERRLA  HENT: Atraumatic; oropharynx clear with moist mucous membranes and no mucosal ulcerations; normal hard and soft palate  Neck: Trachea midline; FROM, supple, no thyromegaly or lymphadenopathy  Lungs: CTA, with normal respiratory effort and no intercostal retractions  CV: RRR, no MRGs, no JVD  Abdomen: Soft, non-tender; no masses or HSM  Extremities: No peripheral edema or extremity lymphadenopathy  Skin: Normal temperature, turgor and texture; no rash, ulcers or subcutaneous nodules  Psych: Appropriate affect, alert and oriented to person, place and time    Data:  Lipids:   Lab Results   Component Value Date/Time    CHOLSTRLTOT 233 (H) 02/15/2023 10:01 AM    TRIGLYCERIDE 95 02/15/2023 10:01 AM    HDL 75 02/15/2023 10:01 AM     (H) 02/15/2023 10:01 AM        BMP:  Lab Results   Component Value Date/Time    SODIUM 139 02/15/2023 1001    POTASSIUM 4.9 02/15/2023 1001    CHLORIDE 103 02/15/2023 1001    CO2 28 02/15/2023 1001    GLUCOSE 84 02/15/2023 1001    BUN 24 (H) 02/15/2023 1001    CREATININE 1.17 02/15/2023 1001    CALCIUM 9.4 02/15/2023 1001    ANION 8.0 02/15/2023 1001        TSH:   Lab Results   Component Value Date/Time    TSHULTRASEN 1.430 03/11/2022 0736        THYROXINE (T4):   No results found for: MELO     CBC:   Lab Results   Component Value Date/Time    WBC 3.5 (L) 03/11/2022 07:36 AM    RBC 4.87 03/11/2022 07:36 AM    HEMOGLOBIN 14.3 03/11/2022 07:36 AM    HEMATOCRIT 43.8 03/11/2022 07:36 AM    MCV 89.9 03/11/2022 07:36 AM    MCH 29.4 03/11/2022 07:36 AM    MCHC 32.6 (L) 03/11/2022 07:36 AM    RDW 46.8 03/11/2022 07:36 AM    PLATELETCT 328 03/11/2022 07:36 AM    MPV 10.4 03/11/2022 07:36 AM    NEUTSPOLYS 76.20 (H) 02/26/2019 10:05 PM    LYMPHOCYTES 17.50 (L) 02/26/2019 10:05 PM    MONOCYTES 4.40 02/26/2019 10:05 PM    EOSINOPHILS 0.90 02/26/2019 10:05 PM    BASOPHILS 0.70 02/26/2019 10:05 PM    IMMGRAN 0.30 02/26/2019 10:05 PM    NRBC 0.00 02/26/2019 10:05 PM     NEUTS 11.58 (H) 02/26/2019 10:05 PM    LYMPHS 2.65 02/26/2019 10:05 PM    MONOS 0.66 02/26/2019 10:05 PM    EOS 0.13 02/26/2019 10:05 PM    BASO 0.10 02/26/2019 10:05 PM    IMMGRANAB 0.04 02/26/2019 10:05 PM    NRBCAB 0.00 02/26/2019 10:05 PM        CBC w/o DIFF  Lab Results   Component Value Date/Time    WBC 3.5 (L) 03/11/2022 07:36 AM    RBC 4.87 03/11/2022 07:36 AM    HEMOGLOBIN 14.3 03/11/2022 07:36 AM    MCV 89.9 03/11/2022 07:36 AM    MCH 29.4 03/11/2022 07:36 AM    MCHC 32.6 (L) 03/11/2022 07:36 AM    RDW 46.8 03/11/2022 07:36 AM    MPV 10.4 03/11/2022 07:36 AM       Prior echo/stress reviewed: Normal echo    EKG interpreted by me: NSR    Impression/Plan:  1. PAC (premature atrial contraction)  EKG      2. Hyperlipidemia, unspecified hyperlipidemia type  CT-HEART W/O CONT EVAL CALCIUM        PACs  HTN  CKD  HLD    - Check CT calcium scoring of the heart. If low CAC, probably reasonable to withhold statin for now  - Continue atenolol for treatment of PACs, HTN    Annual FV    Cristofer Paul MD  Cardiac Electrophysiology

## 2023-04-04 NOTE — ED TRIAGE NOTES
Zena Paz  68 y.o.  female  Chief Complaint   Patient presents with   • Vomiting   • Abdominal Pain     Present to triage dry heaving.c/o abdominal pain and vomiting x 4 hrs. Hx of bowel obstruction.    Attending Attestation (For Attendings USE Only)...

## 2023-04-19 LAB — EKG IMPRESSION: NORMAL

## 2023-05-01 ENCOUNTER — HOSPITAL ENCOUNTER (OUTPATIENT)
Dept: RADIOLOGY | Facility: MEDICAL CENTER | Age: 73
End: 2023-05-01
Attending: INTERNAL MEDICINE
Payer: COMMERCIAL

## 2023-05-01 DIAGNOSIS — E78.5 HYPERLIPIDEMIA, UNSPECIFIED HYPERLIPIDEMIA TYPE: ICD-10-CM

## 2023-05-01 PROCEDURE — 4410556 CT-CARDIAC SCORING (SELF PAY ONLY)

## 2023-05-01 NOTE — RESULT ENCOUNTER NOTE
Great news, your CT came back with ZERO calcium (plaque) in your arteries. I think it is reasonable to continue without the statin at this time.

## 2023-05-22 ENCOUNTER — TELEPHONE (OUTPATIENT)
Dept: NEPHROLOGY | Facility: MEDICAL CENTER | Age: 73
End: 2023-05-22

## 2023-05-22 ENCOUNTER — TELEPHONE (OUTPATIENT)
Dept: SCHEDULING | Facility: IMAGING CENTER | Age: 73
End: 2023-05-22

## 2023-05-22 DIAGNOSIS — N18.9 CHRONIC KIDNEY DISEASE, UNSPECIFIED CKD STAGE: ICD-10-CM

## 2023-05-22 NOTE — TELEPHONE ENCOUNTER
IO    Good morning,     Patient did not get her labs done in time before they  and she would like you to send a new lab request in.       [phone]    Thank You,     Francesca TREVIZO

## 2023-05-24 ENCOUNTER — OFFICE VISIT (OUTPATIENT)
Dept: MEDICAL GROUP | Facility: PHYSICIAN GROUP | Age: 73
End: 2023-05-24
Payer: MEDICARE

## 2023-05-24 VITALS
HEART RATE: 66 BPM | SYSTOLIC BLOOD PRESSURE: 110 MMHG | DIASTOLIC BLOOD PRESSURE: 54 MMHG | HEIGHT: 67 IN | OXYGEN SATURATION: 98 % | WEIGHT: 138 LBS | BODY MASS INDEX: 21.66 KG/M2 | TEMPERATURE: 96.3 F

## 2023-05-24 DIAGNOSIS — N18.31 STAGE 3A CHRONIC KIDNEY DISEASE: ICD-10-CM

## 2023-05-24 DIAGNOSIS — M81.0 AGE-RELATED OSTEOPOROSIS WITHOUT CURRENT PATHOLOGICAL FRACTURE: ICD-10-CM

## 2023-05-24 DIAGNOSIS — N39.46 MIXED STRESS AND URGE URINARY INCONTINENCE: ICD-10-CM

## 2023-05-24 DIAGNOSIS — R22.9 LOCALIZED SUPERFICIAL SWELLING, MASS, OR LUMP: ICD-10-CM

## 2023-05-24 DIAGNOSIS — K86.89 PANCREATIC INSUFFICIENCY: ICD-10-CM

## 2023-05-24 DIAGNOSIS — E78.00 PURE HYPERCHOLESTEROLEMIA: Primary | ICD-10-CM

## 2023-05-24 PROCEDURE — 3078F DIAST BP <80 MM HG: CPT | Performed by: FAMILY MEDICINE

## 2023-05-24 PROCEDURE — 99214 OFFICE O/P EST MOD 30 MIN: CPT | Performed by: FAMILY MEDICINE

## 2023-05-24 PROCEDURE — 3074F SYST BP LT 130 MM HG: CPT | Performed by: FAMILY MEDICINE

## 2023-05-24 ASSESSMENT — ENCOUNTER SYMPTOMS
SHORTNESS OF BREATH: 0
CHILLS: 0
FEVER: 0

## 2023-05-24 ASSESSMENT — FIBROSIS 4 INDEX: FIB4 SCORE: 1.29

## 2023-05-24 NOTE — PROGRESS NOTES
Subjective:     CC: chronic condition updates    HPI:   Zena presents today with    Problem   Localized Superficial Swelling, Mass, Or Lump    ~1 year  Right 5th finger  No pain  Getting larger    US showed complex fluid collection. Will see SUKHDEEP tomorrow.       Hyperlipidemia    Chronic. Not currently on medication. ASCVD 10.8%. CT coronary calcium score = 0.     Lab Results   Component Value Date/Time    CHOLSTRLTOT 233 (H) 02/15/2023 1001    TRIGLYCERIDE 95 02/15/2023 1001    HDL 75 02/15/2023 1001     (H) 02/15/2023 1001          Urinary Incontinence    Years, worse in last couple of years  +stress symptoms  +urge symptoms  Notices it the most when she hikes downhill    She has been on estrogen cream.     She underwent urodynamic testing with her specialist at [facility]. He recommended a few procedures - urethral bulking, mid-urethral sling.       Chronic Kidney Disease, Stage 3, Mod Decreased Gfr (Hcc)    This is a chronic condition. Precipitated by chemotherapy. She is now established with a local nephrologist and plans to stop seeing [facility] nephrology.  GFR: 50 (2/2023) <-- 48       Age-Related Osteoporosis Without Current Pathological Fracture    2023 DEXA: LS T +0.9, L femur T -2.0  3/2022: transitioned back to Prolia  7/2021: transitioned to Fosamax  2020 DEXA: LS T -1.3, L femur T -1.7  2015: Prolia started    She developed osteoporosis from her chemotherapy. She does follow with [facility] endocrinology who is managing this condition.       Pancreatic Insufficiency    Chronic. Currently on creon. She plans on getting an EGD and getting biopsies done.            Health Maintenance: Completed    ROS:  Review of Systems   Constitutional:  Negative for chills and fever.   Respiratory:  Negative for shortness of breath.    Cardiovascular:  Negative for chest pain.       Objective:     Exam:  /54 (BP Location: Right arm, Patient Position: Sitting, BP Cuff Size: Adult)   Pulse 66   Temp (!) 35.7 °C (96.3 °F)  "(Temporal)   Ht 1.702 m (5' 7\")   Wt 62.6 kg (138 lb)   SpO2 98%   BMI 21.61 kg/m²  Body mass index is 21.61 kg/m².    Physical Exam  Constitutional:       Appearance: Normal appearance.   Cardiovascular:      Rate and Rhythm: Normal rate and regular rhythm.      Heart sounds: Normal heart sounds.   Pulmonary:      Effort: Pulmonary effort is normal.      Breath sounds: Normal breath sounds.   Musculoskeletal:      Cervical back: Normal range of motion and neck supple.   Neurological:      Mental Status: She is alert.             Assessment & Plan:     73 y.o. female with the following -     1. Pure hypercholesterolemia  Chronic, stable.  Recent labs do show elevated cholesterol levels.  However, her cardiologist at [facility] got a CT coronary calcium score which was 0 so she does not need start a statin at this time.  We will continue to monitor.    2. Mixed stress and urge urinary incontinence  Chronic, stable.  She reports that she had urodynamic testing with [facility] and they recommend either a bladder sling or urethral bulking.  She is interested in a second opinion before considering these procedures so referral has been placed for local urogynecologist.  - Referral to Urogynecology    3. Stage 3a chronic kidney disease (HCC)  Chronic, stable.  She is now established with a local nephrologist and she will follow-up with them as directed.    4. Localized superficial swelling, mass, or lump  Chronic, stable.  She has had a mass of her right fifth finger for approximately a year.  Ultrasound showed a complex fluid collection so she is seeing a hand orthopedist tomorrow to follow-up on this.    5. Pancreatic insufficiency  Chronic, stable.  She is on Creon.  Her GI doctor at [facility] recommended that she get an upper endoscopy where they can perform biopsies just to be sure.  She will follow-up with them as directed.  She will continue the Creon.    6. Age-related osteoporosis without current pathological " fracture  Chronic, stable.  She is currently on Prolia per her endocrinologist at [facility].  She mentioned that there are some talk of may be lowering her dose of the Prolia but she will follow-up with endocrinology regarding this.    Referral for genetic research was offered. Patient is a participant.      Return in about 6 months (around 11/24/2023) for Medicare Wellness.    Please note that this dictation was created using voice recognition software. I have made every reasonable attempt to correct obvious errors, but I expect that there are errors of grammar and possibly content that I did not discover before finalizing the note.

## 2023-05-25 PROBLEM — R22.31 FINGER MASS, RIGHT: Status: ACTIVE | Noted: 2023-05-25

## 2023-06-20 ENCOUNTER — HOSPITAL ENCOUNTER (OUTPATIENT)
Dept: RADIOLOGY | Facility: MEDICAL CENTER | Age: 73
End: 2023-06-20
Attending: FAMILY MEDICINE
Payer: MEDICARE

## 2023-06-20 DIAGNOSIS — R92.2 DENSE BREAST: ICD-10-CM

## 2023-06-20 DIAGNOSIS — R92.30 DENSE BREAST: ICD-10-CM

## 2023-06-20 PROCEDURE — 76641 ULTRASOUND BREAST COMPLETE: CPT

## 2023-07-20 NOTE — PROGRESS NOTES
Urogynecology and Pelvic Reconstructive Surgery Consultation Visit    Padmini Paz [id number] :1950    Referred by: Martina Grajeda MD    Reason for Visit:   Chief Complaint   Patient presents with    New Patient         Subjective     History of Presenting Illness:    Zena Paz is a 73 y.o. year old who was referred by Dr. Grajeda for the evaluation and management of urinary incontinence.     She is already had an evaluation by urogynecology experts at [facility] (Dr. Fajardo), eluding urodynamics.  These found stress predominant urinary incontinence, but since moving to [place] she would like to have evaluation and treatment closer to home.    Her past medical history is notable for stage IIIc ovarian cancer, treated in 2012 with staging and cisplatin chemotherapy without radiation therapy or other surgeries.  She is currently in remission    Other notable history includes history of a schwannoma excision, and L5-S1 lumbar fusion.  She also has peripheral neuropathy from her cisplatin chemotherapy.    Prior Pelvic surgery:   2012 total abdominal hysterectomy, bilateral salpingo-oophorectomy with staging including pelvic.  Lymph node dissection ([facility])     Prior treatment:   Vaginal estrogen, current  Pilates  Online pelvic exercies.        Pelvic floor symptom review:     Bladder:   Voids per day: 12 Voids per night: 1-2      Urinary incontinence episodes per day: varies with activity    Urge leakage:  On Movement to Bathroom and Full Bladder   Stress leakage: With Cough, With Laugh, With Exercise, and With Bending/Squatting   Continuous / insensible urine loss: No    Nocturnal enuresis: No    Leakage volume: Moderate   Number of pads/day: 2-3    Bladder emptying: Complete   Voiding symptoms: Post-Void Dribble   UTI in last 12 months: 1   Other urologic history: none      Prolapse:     Prolapse symptoms: None      Bowel:    Diarrhea predominant, takes creon   Straining to empty bowels: No    Splinting  to evacuate: No    Painful evacuation: No    Difficulty emptying rectum: No    Incontinence to stool: yes, occasional with diarrhea   Blood in stool: No    Hemorrhoids: Yes   Bowel conditions: no   Most recent colonoscopy: 2022       Sexual function:    Sexually active: Yes   Gender of partners: Male   Pain with intercourse: dryness          Past medical and surgical history    Past obstetric history: nulligravida     Past gynecological history:    Last menstrual period: No LMP recorded. Patient has had a hysterectomy. For ovarian cancer, in remission       Past medical history:  Past Medical History:   Diagnosis Date    Backpain     had back surgery, s1-l5 fusion    Bowel obstruction (HCC)     Cancer (HCC)     overian cancer    Heart murmur     Hypertension     Indigestion     sometimes    Other specified symptom associated with female genital organs     had ca ov the ovary    Schwannoma of cranial nerve (HCC)      Past surgical history:  Past Surgical History:   Procedure Laterality Date    HYSTERECTOMY RADICAL  2012    OOPHORECTOMY Bilateral 2012    FUSION, SPINE, LUMBAR, PLIF  2001    CRANIOTOMY  1993    Schwannoma L 5th CN     Medications:has a current medication list which includes the following prescription(s): creon, atenolol, escitalopram, lorazepam, denosumab, premarin, fluticasone, ayr saline nasal, ondansetron, calcium citrate, polyethyl glycol-propyl glycol, vitamin d, multiple vitamins-minerals, and docosahexanoic acid.  Allergies:Latex, Losartan, and Tetracycline  Family history:  Family History   Problem Relation Age of Onset    Heart Disease Mother     Stroke Father     Cancer Maternal Grandmother         uterine    Ovarian Cancer Neg Hx     Tubal Cancer Neg Hx     Peritoneal Cancer Neg Hx     Colorectal Cancer Neg Hx     Breast Cancer Neg Hx      Social history: reports that she has never smoked. She has never used smokeless tobacco. She reports current alcohol use. She reports that she does not  use drugs.    Review of systems: A full review of systems was performed, and negative with the exception of want is noted above in the HPI.        Objective        /78 (BP Location: Right arm, Patient Position: Sitting, BP Cuff Size: Adult)   Wt 140 lb   BMI 21.93 kg/m²     Physical Exam  Vitals reviewed. Exam conducted with a chaperone present.   Constitutional:       Appearance: Normal appearance.   HENT:      Head: Normocephalic.      Mouth/Throat:      Mouth: Mucous membranes are moist.   Cardiovascular:      Rate and Rhythm: Normal rate.   Pulmonary:      Effort: Pulmonary effort is normal.   Skin:     General: Skin is warm and dry.   Neurological:      Mental Status: She is alert.   Psychiatric:         Mood and Affect: Mood normal.       Procedure Performed: No    Diagnostic test and records review:    Urine dipstick: neg     Post-void residual: 13 mL, performed by Bladder Scanner    Labs: n/a    Radiology:     Procedural:     Urodynamics 5/20/2022: (Performed/evaluated by Dr. Fajardo at [facility], report available, no graphs)  Mixed incontinence, stress greater than urge.  Stress incontinence demonstrated    Documentation reviewed: Prior EMR Records           Assessment & Plan     Zena Paz is a 73 y.o. year old P0 with predominant mixed urinary incontinence. We discussed my recommendations for further diagnosis and treatment at length today.     1. Urinary incontinence, mixed  Ms. Paz reports symptoms of mixed stress and urge urinary incontinence.  She was educated on the pathophysiology of bladder urgency, and that her symptoms are likely due to overactivity of the bladder muscle and nerves. The pathogenesis of JHONATHAN is related to weakness in the pelvic structures includes genetic tendency, aging, menopause and childbirth injuries. I discussed options for management which include both nonsurgical and surgical options.   - For JHONATHAN, management includes non-surgical pelvic floor physical therapy  and pessary use.  I discussed different types of pessary that may be used for stress urinary incontinence as well as pessary care.  She is also a candidate for a mid-urethral sling or urethral bulking procedure.  For urge incontinence and overactive bladder, I reviewed conservative/behavioral management strategies, including fluid management, avoidance of bladder irritants, urge strategies and Kegel's exercises.  -We discussed the differences between bulking and sling.  Bulking is the least invasive and fastest recovery, however comes with lower success rates with 90% having some improvement in symptoms and closer to 60% with significant improvement.  Approximately 1 in 4 patients need a second treatment in order to get them to optimal improvement.  There are no restrictions after this procedure.  The sling has the highest success rates, but uses a lightweight permanent mesh which has a 1 to 3% chance of exposure or other complications long-term, and does require restrictions for 6 weeks in order to allow healing.  -Would like to move forward with scheduling: Urethral bulking  -Significant counseling given about the risk and benefits of this procedure, recovery, risk of catheterization, and recurrence risks      2. Atrophic vaginitis  She has vaginal atrophy on examination. Reviewed risks, benefits, and indications for vaginal estrogen therapy.  Continue with vaginal estrogen therapy twice weekly.       3. 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   - Referral to Gynecologic Oncology to establish care locally ([place])                 Noel Martínez MD, FACOG  Urogynecology and Pelvic Reconstructive Surgery  Department of Obstetrics and Gynecology  [facility]      This medical record contains text that has been entered with the assistance of computer voice recognition and dictation software.  Therefore,  it may contain unintended errors in text, spelling, punctuation, or grammar

## 2023-07-21 ENCOUNTER — GYNECOLOGY VISIT (OUTPATIENT)
Dept: OBGYN | Facility: CLINIC | Age: 73
End: 2023-07-21
Payer: MEDICARE

## 2023-07-21 VITALS — WEIGHT: 140 LBS | BODY MASS INDEX: 21.93 KG/M2 | SYSTOLIC BLOOD PRESSURE: 123 MMHG | DIASTOLIC BLOOD PRESSURE: 78 MMHG

## 2023-07-21 DIAGNOSIS — C56.9 EPITHELIAL OVARIAN CANCER, FIGO STAGE IIIC (HCC): ICD-10-CM

## 2023-07-21 DIAGNOSIS — N95.2 ATROPHIC VAGINITIS: ICD-10-CM

## 2023-07-21 DIAGNOSIS — N39.46 URINARY INCONTINENCE, MIXED: Primary | ICD-10-CM

## 2023-07-21 LAB
APPEARANCE UR: CLEAR
BILIRUB UR STRIP-MCNC: NORMAL MG/DL
COLOR UR AUTO: YELLOW
GLUCOSE UR STRIP.AUTO-MCNC: NEGATIVE MG/DL
KETONES UR STRIP.AUTO-MCNC: NEGATIVE MG/DL
LEUKOCYTE ESTERASE UR QL STRIP.AUTO: NEGATIVE
NITRITE UR QL STRIP.AUTO: NEGATIVE
PH UR STRIP.AUTO: 6.5 [PH] (ref 5–8)
PROT UR QL STRIP: NEGATIVE MG/DL
RBC UR QL AUTO: NEGATIVE
SP GR UR STRIP.AUTO: 1.01
UROBILINOGEN UR STRIP-MCNC: NORMAL MG/DL

## 2023-07-21 PROCEDURE — 3078F DIAST BP <80 MM HG: CPT | Performed by: STUDENT IN AN ORGANIZED HEALTH CARE EDUCATION/TRAINING PROGRAM

## 2023-07-21 PROCEDURE — 3074F SYST BP LT 130 MM HG: CPT | Performed by: STUDENT IN AN ORGANIZED HEALTH CARE EDUCATION/TRAINING PROGRAM

## 2023-07-21 PROCEDURE — 99204 OFFICE O/P NEW MOD 45 MIN: CPT | Performed by: STUDENT IN AN ORGANIZED HEALTH CARE EDUCATION/TRAINING PROGRAM

## 2023-07-21 PROCEDURE — 81002 URINALYSIS NONAUTO W/O SCOPE: CPT | Performed by: STUDENT IN AN ORGANIZED HEALTH CARE EDUCATION/TRAINING PROGRAM

## 2023-07-21 ASSESSMENT — FIBROSIS 4 INDEX: FIB4 SCORE: 1.29

## 2023-07-21 NOTE — PROGRESS NOTES
PT here today Consult   PT States Mixed Stress and Urge Urinary Incontinence  Hysterectomy? Yes  Good #: [phone]  PVR : 13  Pharmacy Verified

## 2023-08-18 ENCOUNTER — HOSPITAL ENCOUNTER (OUTPATIENT)
Dept: LAB | Facility: MEDICAL CENTER | Age: 73
End: 2023-08-18
Attending: INTERNAL MEDICINE
Payer: MEDICARE

## 2023-08-18 DIAGNOSIS — N18.9 CHRONIC KIDNEY DISEASE, UNSPECIFIED CKD STAGE: ICD-10-CM

## 2023-08-18 DIAGNOSIS — N18.31 STAGE 3A CHRONIC KIDNEY DISEASE: ICD-10-CM

## 2023-08-18 LAB
ALBUMIN SERPL BCP-MCNC: 4.3 G/DL (ref 3.2–4.9)
ALBUMIN/GLOB SERPL: 1.9 G/DL
ALP SERPL-CCNC: 29 U/L (ref 30–99)
ALT SERPL-CCNC: 10 U/L (ref 2–50)
ANION GAP SERPL CALC-SCNC: 13 MMOL/L (ref 7–16)
APPEARANCE UR: CLEAR
AST SERPL-CCNC: 17 U/L (ref 12–45)
BILIRUB SERPL-MCNC: 0.4 MG/DL (ref 0.1–1.5)
BILIRUB UR QL STRIP.AUTO: NEGATIVE
BUN SERPL-MCNC: 25 MG/DL (ref 8–22)
CALCIUM ALBUM COR SERPL-MCNC: 9.5 MG/DL (ref 8.5–10.5)
CALCIUM SERPL-MCNC: 9.7 MG/DL (ref 8.5–10.5)
CHLORIDE SERPL-SCNC: 101 MMOL/L (ref 96–112)
CO2 SERPL-SCNC: 25 MMOL/L (ref 20–33)
COLOR UR: YELLOW
CREAT SERPL-MCNC: 1.2 MG/DL (ref 0.5–1.4)
CREAT UR-MCNC: 29.67 MG/DL
ERYTHROCYTE [DISTWIDTH] IN BLOOD BY AUTOMATED COUNT: 47.8 FL (ref 35.9–50)
GFR SERPLBLD CREATININE-BSD FMLA CKD-EPI: 48 ML/MIN/1.73 M 2
GLOBULIN SER CALC-MCNC: 2.3 G/DL (ref 1.9–3.5)
GLUCOSE SERPL-MCNC: 95 MG/DL (ref 65–99)
GLUCOSE UR STRIP.AUTO-MCNC: NEGATIVE MG/DL
HCT VFR BLD AUTO: 43.6 % (ref 37–47)
HGB BLD-MCNC: 14.2 G/DL (ref 12–16)
KETONES UR STRIP.AUTO-MCNC: NEGATIVE MG/DL
LEUKOCYTE ESTERASE UR QL STRIP.AUTO: NEGATIVE
MCH RBC QN AUTO: 29.4 PG (ref 27–33)
MCHC RBC AUTO-ENTMCNC: 32.6 G/DL (ref 32.2–35.5)
MCV RBC AUTO: 90.3 FL (ref 81.4–97.8)
MICRO URNS: NORMAL
MICROALBUMIN UR-MCNC: <1.2 MG/DL
MICROALBUMIN/CREAT UR: NORMAL MG/G (ref 0–30)
NITRITE UR QL STRIP.AUTO: NEGATIVE
PH UR STRIP.AUTO: 7 [PH] (ref 5–8)
PHOSPHATE SERPL-MCNC: 4.2 MG/DL (ref 2.5–4.5)
PLATELET # BLD AUTO: 297 K/UL (ref 164–446)
PMV BLD AUTO: 10 FL (ref 9–12.9)
POTASSIUM SERPL-SCNC: 4.4 MMOL/L (ref 3.6–5.5)
PROT SERPL-MCNC: 6.6 G/DL (ref 6–8.2)
PROT UR QL STRIP: NEGATIVE MG/DL
PTH-INTACT SERPL-MCNC: 51.4 PG/ML (ref 14–72)
RBC # BLD AUTO: 4.83 M/UL (ref 4.2–5.4)
RBC UR QL AUTO: NEGATIVE
SODIUM SERPL-SCNC: 139 MMOL/L (ref 135–145)
SP GR UR STRIP.AUTO: 1.01
UROBILINOGEN UR STRIP.AUTO-MCNC: 0.2 MG/DL
WBC # BLD AUTO: 4.3 K/UL (ref 4.8–10.8)

## 2023-08-18 PROCEDURE — 83970 ASSAY OF PARATHORMONE: CPT

## 2023-08-18 PROCEDURE — 85027 COMPLETE CBC AUTOMATED: CPT

## 2023-08-18 PROCEDURE — 84100 ASSAY OF PHOSPHORUS: CPT

## 2023-08-18 PROCEDURE — 82570 ASSAY OF URINE CREATININE: CPT

## 2023-08-18 PROCEDURE — 80053 COMPREHEN METABOLIC PANEL: CPT

## 2023-08-18 PROCEDURE — 36415 COLL VENOUS BLD VENIPUNCTURE: CPT

## 2023-08-18 PROCEDURE — 82043 UR ALBUMIN QUANTITATIVE: CPT

## 2023-08-18 PROCEDURE — 81003 URINALYSIS AUTO W/O SCOPE: CPT

## 2023-09-08 ENCOUNTER — OUTPATIENT INFUSION SERVICES (OUTPATIENT)
Dept: ONCOLOGY | Facility: MEDICAL CENTER | Age: 73
End: 2023-09-08
Attending: FAMILY MEDICINE
Payer: MEDICARE

## 2023-09-08 VITALS
SYSTOLIC BLOOD PRESSURE: 119 MMHG | BODY MASS INDEX: 22.11 KG/M2 | TEMPERATURE: 97.9 F | DIASTOLIC BLOOD PRESSURE: 67 MMHG | WEIGHT: 137.57 LBS | HEART RATE: 58 BPM | RESPIRATION RATE: 18 BRPM | HEIGHT: 66 IN | OXYGEN SATURATION: 97 %

## 2023-09-08 DIAGNOSIS — M81.0 AGE-RELATED OSTEOPOROSIS WITHOUT CURRENT PATHOLOGICAL FRACTURE: ICD-10-CM

## 2023-09-08 PROCEDURE — 96372 THER/PROPH/DIAG INJ SC/IM: CPT

## 2023-09-08 PROCEDURE — 700111 HCHG RX REV CODE 636 W/ 250 OVERRIDE (IP): Mod: JZ,JG | Performed by: FAMILY MEDICINE

## 2023-09-08 RX ORDER — METHYLPREDNISOLONE SODIUM SUCCINATE 125 MG/2ML
125 INJECTION, POWDER, LYOPHILIZED, FOR SOLUTION INTRAMUSCULAR; INTRAVENOUS PRN
Status: CANCELLED | OUTPATIENT
Start: 2024-02-19

## 2023-09-08 RX ORDER — EPINEPHRINE 1 MG/ML(1)
0.5 AMPUL (ML) INJECTION PRN
Status: CANCELLED | OUTPATIENT
Start: 2024-02-19

## 2023-09-08 RX ORDER — DIPHENHYDRAMINE HYDROCHLORIDE 50 MG/ML
50 INJECTION INTRAMUSCULAR; INTRAVENOUS PRN
Status: CANCELLED | OUTPATIENT
Start: 2024-02-19

## 2023-09-08 RX ADMIN — DENOSUMAB 60 MG: 60 INJECTION SUBCUTANEOUS at 10:55

## 2023-09-08 ASSESSMENT — FIBROSIS 4 INDEX: FIB4 SCORE: 1.29

## 2023-09-09 NOTE — PROGRESS NOTES
Pt to [facility] for Prolia injection Age-related osteoporosis without current pathological fracture. Pt denied current infections, no recent or planned oral surgery, and no s/sx of hypocalcemia. Lab drawn 8/30/2023 within treatable parameters. Pt received Prolia as ordered in left back of the arm, gauze and paper tape applied to site. Patient to continue to take calcium and vitamin D every day. Discharge home to self care in [facility]. Appointment confirm for next treatment.

## 2023-09-12 ENCOUNTER — OFFICE VISIT (OUTPATIENT)
Dept: NEPHROLOGY | Facility: MEDICAL CENTER | Age: 73
End: 2023-09-12
Payer: MEDICARE

## 2023-09-12 VITALS
BODY MASS INDEX: 21.47 KG/M2 | HEIGHT: 67 IN | WEIGHT: 136.8 LBS | SYSTOLIC BLOOD PRESSURE: 110 MMHG | TEMPERATURE: 97.8 F | DIASTOLIC BLOOD PRESSURE: 68 MMHG | HEART RATE: 56 BPM | OXYGEN SATURATION: 99 %

## 2023-09-12 DIAGNOSIS — N18.9 CHRONIC KIDNEY DISEASE, UNSPECIFIED CKD STAGE: ICD-10-CM

## 2023-09-12 PROCEDURE — 3074F SYST BP LT 130 MM HG: CPT | Performed by: INTERNAL MEDICINE

## 2023-09-12 PROCEDURE — 3078F DIAST BP <80 MM HG: CPT | Performed by: INTERNAL MEDICINE

## 2023-09-12 PROCEDURE — 99214 OFFICE O/P EST MOD 30 MIN: CPT | Performed by: INTERNAL MEDICINE

## 2023-09-12 ASSESSMENT — FIBROSIS 4 INDEX: FIB4 SCORE: 1.29

## 2023-09-12 NOTE — PROGRESS NOTES
NEPHROLOGY HISTORY AND PHYSICAL CONSULT NOTE           HPI:  Zena Paz is a 73 y.o. female with CKD ovarian cancer s/p, ckd who presents for follow-up.    Patient establish care a few months ago to for further evaluation of renal insufficiency.  She had previously seen a [facility] nephrologist for her care.  Over the last 1 year her renal function has overall remained normal with an EGFR of between 4040 and 53 mL/min per 1.73 m²    She is otherwise in her normal state of health.  Denies any lower extremity swelling as well as urinary complaints    Patient has recently come back from a prolonged trip to [place] and then may be her [facility]       Outpatient Encounter Medications as of 9/12/2023   Medication Sig Dispense Refill    CREON 81105-13814 units Cap DR Particles TAKE 3 CAPSULES BY MOUTH THREE TIMES DAILY WITH MEALS      atenolol (TENORMIN) 25 MG Tab Take 1 Tablet by mouth every evening. 90 Tablet 3    escitalopram (LEXAPRO) 10 MG Tab Take 1 Tablet by mouth every morning. 90 Tablet 3    LORazepam (ATIVAN) 1 MG Tab Take 0.5 mg by mouth. ONCE IN AWHILE. MAYBE A COUPLE TIMES OF YEAR      denosumab (PROLIA) 60 MG/ML Solution Prefilled Syringe injection Inject 60 mg under the skin. Twice a year      estrogens, conjugated (PREMARIN) 0.625 MG/GM Cream Insert 0.5 g into the vagina every day.      fluticasone (FLONASE) 50 MCG/ACT nasal spray Spray 1 Spray in nose every day.      Ayr Saline Nasal Gel Apply  to affected nostril(s). ONCE IN AWHILE IF NEEDED. BEEN ABOUT A YEAR      ondansetron (ZOFRAN) 8 MG Tab Take 8 mg by mouth.      calcium citrate (CALCITRATE) 950 MG Tab Take 950 mg by mouth every day.      polyethyl glycol-propyl glycol (SYSTANE) 0.4-0.3 % Solution Place 1 Drop in both eyes every morning.      vitamin D (CHOLECALCIFEROL) 1000 UNIT TABS Take 2,000 Units by mouth every day.      Multiple Vitamins-Minerals (SENIOR MULTIVITAMIN PLUS PO) Take 1 Tab by mouth every morning.      docosahexanoic acid  "(OMEGA 3 FA) 1000 MG CAPS Take 1,000 mg by mouth every morning.       No facility-administered encounter medications on file as of 9/12/2023.        Allergies   Allergen Reactions    Latex Rash     Rash      Losartan      palpitations    Tetracycline      Sensitivity to sun; sun burns.  Reaction date; 15 years ago from 2013.       ROS    /68 (BP Location: Right arm, Patient Position: Sitting, BP Cuff Size: Adult)   Pulse (!) 56   Temp 36.6 °C (97.8 °F) (Temporal)   Ht 1.702 m (5' 7\")   Wt 62.1 kg (136 lb 12.8 oz)   SpO2 99%   BMI 21.43 kg/m²     Physical Exam  GEN: alert and oriented. In no acute distress.   HEENT: moist oropharyngeal mucous membranes  CV:RRR  PULM: clear to auscultation bilaterally  ABD: soft non tender non distended  EXT: warm well perfused, no lower extremity edema.    Labs reviewed.  Recent Labs     02/15/23  1001 08/18/23  0830   ALBUMIN  --  4.3   HDL 75  --    TRIGLYCERIDE 95  --    SODIUM 139 139   POTASSIUM 4.9 4.4   CHLORIDE 103 101   CO2 28 25   BUN 24* 25*   CREATININE 1.17 1.20   PHOSPHORUS  --  4.2       Lab Results   Component Value Date/Time    WBC 4.3 (L) 08/18/2023 08:30 AM    RBC 4.83 08/18/2023 08:30 AM    HEMOGLOBIN 14.2 08/18/2023 08:30 AM    HEMATOCRIT 43.6 08/18/2023 08:30 AM    MCV 90.3 08/18/2023 08:30 AM    MCH 29.4 08/18/2023 08:30 AM    MCHC 32.6 08/18/2023 08:30 AM    MPV 10.0 08/18/2023 08:30 AM              URINALYSIS:  Lab Results   Component Value Date/Time    COLORURINE Yellow 08/18/2023 0830    CLARITY Clear 08/18/2023 0830    SPECGRAVITY 1.008 08/18/2023 0830    PHURINE 7.0 08/18/2023 0830    KETONES Negative 08/18/2023 0830    PROTEINURIN Negative 08/18/2023 0830    BILIRUBINUR Negative 08/18/2023 0830    UROBILU 0.2 08/18/2023 0830    NITRITE Negative 08/18/2023 0830    LEUKESTERAS Negative 08/18/2023 0830    OCCULTBLOOD Negative 08/18/2023 0830     [facility]  [place]   Component Value Date/Time    TOTPROTUR 8.0 02/15/2023 1000      Lab Results "   Component Value Date/Time    CREATININEU 106.72 02/15/2023 1000       Imaging report(s) reviewed  No orders to display         Assessment:   Zena Paz is a 72 y.o. female ovarian malignancy,   Renal function remains stable with a corresponding EGFR of 50 mL/min per 1.73 m².  Urinalysis bland. Negative for microalbuminuria.     -Urinalysis bland   -MR Abdomen negative for hydronephrosis.   -Routine management of hypertension  - Dose all medications for patient level of renal function  - Avoid nephrotoxic agents including contrast and NSAIDs  - Encourage adequate hydration.  - Continue to monitor renal function.  Obtain BMP, urine studies including urine protein quantification.    2. HTN -Continue atenolol. Patient to record blood pressure and bring readings into next clinic visit. Low sodium diet.     3. Anemia -hemoglobin remained stable.  No indication for erythropoiesis stimulating agents in the setting of malignancy.     4. Electrolytes are within normal limits.      5. PAC- atenolol.      Return to clinic in 4 months.         Lidia Hsieh MD  Nephrology  [facility]

## 2023-09-14 ENCOUNTER — APPOINTMENT (OUTPATIENT)
Dept: NEPHROLOGY | Facility: MEDICAL CENTER | Age: 73
End: 2023-09-14
Payer: MEDICARE

## 2023-11-06 RX ORDER — ESCITALOPRAM OXALATE 10 MG/1
10 TABLET ORAL EVERY MORNING
Qty: 90 TABLET | Refills: 3 | Status: SHIPPED | OUTPATIENT
Start: 2023-11-06

## 2023-11-19 SDOH — HEALTH STABILITY: PHYSICAL HEALTH: ON AVERAGE, HOW MANY DAYS PER WEEK DO YOU ENGAGE IN MODERATE TO STRENUOUS EXERCISE (LIKE A BRISK WALK)?: 7 DAYS

## 2023-11-19 SDOH — HEALTH STABILITY: PHYSICAL HEALTH: ON AVERAGE, HOW MANY MINUTES DO YOU ENGAGE IN EXERCISE AT THIS LEVEL?: 60 MIN

## 2023-11-19 SDOH — ECONOMIC STABILITY: INCOME INSECURITY: IN THE LAST 12 MONTHS, WAS THERE A TIME WHEN YOU WERE NOT ABLE TO PAY THE MORTGAGE OR RENT ON TIME?: NO

## 2023-11-19 SDOH — ECONOMIC STABILITY: FOOD INSECURITY: WITHIN THE PAST 12 MONTHS, THE FOOD YOU BOUGHT JUST DIDN'T LAST AND YOU DIDN'T HAVE MONEY TO GET MORE.: NEVER TRUE

## 2023-11-19 SDOH — ECONOMIC STABILITY: FOOD INSECURITY: WITHIN THE PAST 12 MONTHS, YOU WORRIED THAT YOUR FOOD WOULD RUN OUT BEFORE YOU GOT MONEY TO BUY MORE.: NEVER TRUE

## 2023-11-19 SDOH — ECONOMIC STABILITY: HOUSING INSECURITY: IN THE LAST 12 MONTHS, HOW MANY PLACES HAVE YOU LIVED?: 1

## 2023-11-19 ASSESSMENT — SOCIAL DETERMINANTS OF HEALTH (SDOH)
IN A TYPICAL WEEK, HOW MANY TIMES DO YOU TALK ON THE PHONE WITH FAMILY, FRIENDS, OR NEIGHBORS?: MORE THAN THREE TIMES A WEEK
HOW OFTEN DO YOU HAVE SIX OR MORE DRINKS ON ONE OCCASION: NEVER
HOW OFTEN DO YOU GET TOGETHER WITH FRIENDS OR RELATIVES?: MORE THAN THREE TIMES A WEEK
DO YOU BELONG TO ANY CLUBS OR ORGANIZATIONS SUCH AS CHURCH GROUPS UNIONS, FRATERNAL OR ATHLETIC GROUPS, OR SCHOOL GROUPS?: YES
WITHIN THE PAST 12 MONTHS, YOU WORRIED THAT YOUR FOOD WOULD RUN OUT BEFORE YOU GOT THE MONEY TO BUY MORE: NEVER TRUE
IN A TYPICAL WEEK, HOW MANY TIMES DO YOU TALK ON THE PHONE WITH FAMILY, FRIENDS, OR NEIGHBORS?: MORE THAN THREE TIMES A WEEK
HOW OFTEN DO YOU ATTEND CHURCH OR RELIGIOUS SERVICES?: NEVER
HOW MANY DRINKS CONTAINING ALCOHOL DO YOU HAVE ON A TYPICAL DAY WHEN YOU ARE DRINKING: 1 OR 2
HOW OFTEN DO YOU ATTENT MEETINGS OF THE CLUB OR ORGANIZATION YOU BELONG TO?: MORE THAN 4 TIMES PER YEAR
HOW OFTEN DO YOU ATTENT MEETINGS OF THE CLUB OR ORGANIZATION YOU BELONG TO?: MORE THAN 4 TIMES PER YEAR
DO YOU BELONG TO ANY CLUBS OR ORGANIZATIONS SUCH AS CHURCH GROUPS UNIONS, FRATERNAL OR ATHLETIC GROUPS, OR SCHOOL GROUPS?: YES
HOW OFTEN DO YOU ATTEND CHURCH OR RELIGIOUS SERVICES?: NEVER
HOW OFTEN DO YOU GET TOGETHER WITH FRIENDS OR RELATIVES?: MORE THAN THREE TIMES A WEEK

## 2023-11-19 ASSESSMENT — LIFESTYLE VARIABLES
HOW MANY STANDARD DRINKS CONTAINING ALCOHOL DO YOU HAVE ON A TYPICAL DAY: 1 OR 2
HOW OFTEN DO YOU HAVE SIX OR MORE DRINKS ON ONE OCCASION: NEVER

## 2023-11-20 ENCOUNTER — OFFICE VISIT (OUTPATIENT)
Dept: MEDICAL GROUP | Facility: PHYSICIAN GROUP | Age: 73
End: 2023-11-20
Payer: MEDICARE

## 2023-11-20 VITALS
WEIGHT: 137.4 LBS | HEIGHT: 67 IN | SYSTOLIC BLOOD PRESSURE: 108 MMHG | TEMPERATURE: 97.6 F | DIASTOLIC BLOOD PRESSURE: 60 MMHG | OXYGEN SATURATION: 98 % | BODY MASS INDEX: 21.56 KG/M2 | HEART RATE: 60 BPM

## 2023-11-20 DIAGNOSIS — R23.2 HOT FLASHES: ICD-10-CM

## 2023-11-20 DIAGNOSIS — T45.1X5A CHEMOTHERAPY-INDUCED NEUROPATHY (HCC): ICD-10-CM

## 2023-11-20 DIAGNOSIS — N18.31 STAGE 3A CHRONIC KIDNEY DISEASE: ICD-10-CM

## 2023-11-20 DIAGNOSIS — G62.0 CHEMOTHERAPY-INDUCED NEUROPATHY (HCC): ICD-10-CM

## 2023-11-20 DIAGNOSIS — K86.89 PANCREATIC INSUFFICIENCY: ICD-10-CM

## 2023-11-20 DIAGNOSIS — M81.0 AGE-RELATED OSTEOPOROSIS WITHOUT CURRENT PATHOLOGICAL FRACTURE: ICD-10-CM

## 2023-11-20 DIAGNOSIS — N39.46 MIXED STRESS AND URGE URINARY INCONTINENCE: ICD-10-CM

## 2023-11-20 DIAGNOSIS — K52.9 CHRONIC DIARRHEA: ICD-10-CM

## 2023-11-20 DIAGNOSIS — E78.00 PURE HYPERCHOLESTEROLEMIA: ICD-10-CM

## 2023-11-20 DIAGNOSIS — I10 PRIMARY HYPERTENSION: ICD-10-CM

## 2023-11-20 DIAGNOSIS — Z00.00 ENCOUNTER FOR MEDICARE ANNUAL WELLNESS EXAM: Primary | ICD-10-CM

## 2023-11-20 PROBLEM — R22.9 LOCALIZED SUPERFICIAL SWELLING, MASS, OR LUMP: Status: RESOLVED | Noted: 2023-02-15 | Resolved: 2023-11-20

## 2023-11-20 PROBLEM — N95.2 VAGINAL ATROPHY: Status: ACTIVE | Noted: 2023-11-20

## 2023-11-20 PROBLEM — R22.31 FINGER MASS, RIGHT: Status: RESOLVED | Noted: 2023-05-25 | Resolved: 2023-11-20

## 2023-11-20 PROBLEM — Z71.84 TRAVEL ADVICE ENCOUNTER: Status: RESOLVED | Noted: 2022-12-09 | Resolved: 2023-11-20

## 2023-11-20 PROCEDURE — G0439 PPPS, SUBSEQ VISIT: HCPCS | Performed by: FAMILY MEDICINE

## 2023-11-20 PROCEDURE — 3074F SYST BP LT 130 MM HG: CPT | Performed by: FAMILY MEDICINE

## 2023-11-20 PROCEDURE — 3078F DIAST BP <80 MM HG: CPT | Performed by: FAMILY MEDICINE

## 2023-11-20 RX ORDER — ATENOLOL 25 MG/1
25 TABLET ORAL EVERY EVENING
Qty: 90 TABLET | Refills: 3 | Status: SHIPPED | OUTPATIENT
Start: 2023-11-20 | End: 2024-01-31

## 2023-11-20 ASSESSMENT — FIBROSIS 4 INDEX: FIB4 SCORE: 1.29

## 2023-11-20 ASSESSMENT — ACTIVITIES OF DAILY LIVING (ADL): BATHING_REQUIRES_ASSISTANCE: 0

## 2023-11-20 ASSESSMENT — PATIENT HEALTH QUESTIONNAIRE - PHQ9: CLINICAL INTERPRETATION OF PHQ2 SCORE: 0

## 2023-11-20 ASSESSMENT — ENCOUNTER SYMPTOMS: GENERAL WELL-BEING: GOOD

## 2023-11-20 NOTE — PROGRESS NOTES
Chief Complaint   Patient presents with    Medicare Annual Wellness       HPI:  Zena Paz is a 73 y.o. here for Medicare Annual Wellness Visit     Patient Active Problem List    Diagnosis Date Noted    Chronic diarrhea 11/20/2023    Vaginal atrophy 11/20/2023    Hyperlipidemia 10/24/2022    History of COVID-19 07/27/2022    Urinary incontinence 04/22/2022    PAC (premature atrial contraction) 02/28/2022    Environmental allergies 02/24/2020    Hot flashes 02/14/2020    Chronic pain of right knee 06/20/2019    History of small bowel obstruction 08/20/2018    Chronic kidney disease, stage 3, mod decreased GFR (HCC) 08/14/2018    Age-related osteoporosis without current pathological fracture 08/14/2018    History of ovarian cancer 04/05/2018    Chemotherapy-induced neuropathy (HCC) 04/05/2018    Pancreatic insufficiency 04/05/2018    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 90 Tablet 3    escitalopram (LEXAPRO) 10 MG Tab TAKE 1 TABLET BY MOUTH EVERY MORNING 90 Tablet 3    CREON 48061-27512 units Cap DR Particles TAKE 3 CAPSULES BY MOUTH THREE TIMES DAILY WITH MEALS      LORazepam (ATIVAN) 1 MG Tab Take 0.5 mg by mouth. ONCE IN AWHILE. MAYBE A COUPLE TIMES OF YEAR      denosumab (PROLIA) 60 MG/ML Solution Prefilled Syringe injection Inject 60 mg under the skin. Twice a year      estrogens, conjugated (PREMARIN) 0.625 MG/GM Cream Insert 0.5 g into the vagina every day.      fluticasone (FLONASE) 50 MCG/ACT nasal spray Spray 1 Spray in nose every day.      Ayr Saline Nasal Gel Apply  to affected nostril(s). ONCE IN AWHILE IF NEEDED. BEEN ABOUT A YEAR      ondansetron (ZOFRAN) 8 MG Tab Take 8 mg by mouth.      calcium citrate (CALCITRATE) 950 MG Tab Take 950 mg by mouth every day.      polyethyl glycol-propyl  glycol (SYSTANE) 0.4-0.3 % Solution Place 1 Drop in both eyes every morning.      vitamin D (CHOLECALCIFEROL) 1000 UNIT TABS Take 2,000 Units by mouth every day.      Multiple Vitamins-Minerals (SENIOR MULTIVITAMIN PLUS PO) Take 1 Tab by mouth every morning.      docosahexanoic acid (OMEGA 3 FA) 1000 MG CAPS Take 1,000 mg by mouth every morning.       No current facility-administered medications for this visit.          Current supplements as per medication list.     Allergies: Latex, Losartan, and Tetracycline    Current social contact/activities: visiting with family/ friends      She  reports that she has never smoked. She has never used smokeless tobacco. She reports current alcohol use of about 1.2 oz of alcohol per week. She reports that she does not use drugs.  Counseling given: Yes      ROS:    Gait: Uses no assistive device  Ostomy: No  Other tubes: No  Amputations: No  Chronic oxygen use: No  Last eye exam: last week   Wears hearing aids: No   : Reports urinary leakage during the last 6 months that has not interfered at all with their daily activities or sleep.    Screening:    Depression Screening  Little interest or pleasure in doing things?  0 - not at all  Feeling down, depressed , or hopeless? 0 - not at all  Trouble falling or staying asleep, or sleeping too much?     Feeling tired or having little energy?     Poor appetite or overeating?     Feeling bad about yourself - or that you are a failure or have let yourself or your family down?    Trouble concentrating on things, such as reading the newspaper or watching television?    Moving or speaking so slowly that other people could have noticed.  Or the opposite - being so fidgety or restless that you have been moving around a lot more than usual?     Thoughts that you would be better off dead, or of hurting yourself?     Patient Health Questionnaire Score:      If depressive symptoms identified deferred to follow up visit unless specifically  addressed in assessment and plan.    Interpretation of PHQ-9 Total Score   Score Severity   1-4 No Depression   5-9 Mild Depression   10-14 Moderate Depression   15-19 Moderately Severe Depression   20-27 Severe Depression    Screening for Cognitive Impairment  Do you or any of your friends or family members have any concern about your memory? No  Three Minute Recall (Banana, Sunrise, Chair) 3/3    Kali clock face with all 12 numbers and set the hands to show 20 past 8.  Yes    Cognitive concerns identified deferred for follow up unless specifically addressed in assessment and plan.    Fall Risk Assessment  Has the patient had two or more falls in the last year or any fall with injury in the last year?  No    Safety Assessment  Do you always wear your seatbelt?  Yes  Any changes to home needed to function safely? No  Difficulty hearing.  No  Patient counseled about all safety risks that were identified.    Functional Assessment ADLs  Are there any barriers preventing you from cooking for yourself or meeting nutritional needs?  No.    Are there any barriers preventing you from driving safely or obtaining transportation?  No.    Are there any barriers preventing you from using a telephone or calling for help?  No    Are there any barriers preventing you from shopping?  No.    Are there any barriers preventing you from taking care of your own finances?  No    Are there any barriers preventing you from managing your medications?  No    Are there any barriers preventing you from showering, bathing or dressing yourself? No    Are there any barriers preventing you from doing housework or laundry? No    Are there any barriers preventing you from using the toilet?No    Are you currently engaging in any exercise or physical activity?  Yes.      Self-Assessment of Health  What is your perception of your health? Good    Do you sleep more than six hours a night? Yes    In the past 7 days, how much did pain keep you from doing  your normal work? None    Do you spend quality time with family or friends (virtually or in person)? Yes    Do you usually eat a heart healthy diet that constists of a variety of fruits, vegetables, whole grains and fiber? Yes    Do you eat foods high in fat and/or Fast Food more than three times per week? No    How concerned are you that your medical conditions are not being well managed? a little        Advance Care Planning  Do you have an Advance Directive, Living Will, Durable Power of , or POLST? Yes  Advance Directive       is on file      Health Maintenance Summary            Annual Wellness Visit (Every 366 Days) Next due on 11/20/2024 11/20/2023  Visit Dx: Encounter for Medicare annual wellness exam    10/24/2022  Visit Dx: Encounter for Medicare annual wellness exam    10/24/2022  Level of Service: IA ANNUAL WELLNESS VISIT-INCLUDES PPPS SUBSEQUE*    10/14/2021  Level of Service: IA ANNUAL WELLNESS VISIT-INCLUDES PPPS SUBSEQUE*    10/14/2021  Visit Dx: Encounter for Medicare annual wellness exam    Only the first 5 history entries have been loaded, but more history exists.              Mammogram (Every 2 Years) Next due on 11/28/2024 11/28/2022  MA-SCREENING MAMMO BILAT W/TOMOSYNTHESIS W/CAD    10/25/2021  MA-SCREENING MAMMO BILAT W/TOMOSYNTHESIS W/CAD    09/28/2020  MA-SCREENING MAMMO BILAT W/TOMOSYNTHESIS W/CAD    09/26/2019  MA-SCREENING MAMMO BILAT W/TOMOSYNTHESIS W/CAD    09/18/2018  MA-MAMMO SCREENING BILAT W/ABDI W/CAD    Only the first 5 history entries have been loaded, but more history exists.              Colorectal Cancer Screening (Colonoscopy - Every 5 Years) Next due on 7/19/2026 07/19/2021  REFERRAL TO GI FOR COLONOSCOPY    06/28/2016  REFERRAL TO GI FOR COLONOSCOPY              Bone Density Scan (Every 5 Years) Tentatively due on 1/25/2028 01/25/2023  Outside Procedure: DS-BONE DENSITY STUDY (DEXA)    01/06/2020  Outside Procedure: DS-BONE DENSITY STUDY (DEXA)               IMM DTaP/Tdap/Td Vaccine (3 - Td or Tdap) Next due on 11/10/2030      11/10/2020  Imm Admin: Tdap Vaccine    10/05/2010  Imm Admin: Tdap Vaccine    2007  Imm Admin: TD Vaccine              Hepatitis B Vaccine (Hep B) (Series Information) Completed      2017  Imm Admin: Hepatitis B Vaccine (Adol/Adult)    2017  Imm Admin: Hepatitis B Vaccine Adolescent/Pediatric    2014  Imm Admin: Hepatitis B Vaccine (Adol/Adult)    2014  Imm Admin: Hepatitis B Vaccine Adolescent/Pediatric    2012  Imm Admin: Hepatitis B Vaccine (Adol/Adult)    Only the first 5 history entries have been loaded, but more history exists.              Pneumococcal Vaccine: 65+ Years (Series Information) Completed      2019  Imm Admin: Pneumococcal polysaccharide vaccine (PPSV-23)    2017  Imm Admin: Pneumococcal Conjugate Vaccine (Prevnar/PCV-13)    10/17/2012  Imm Admin: Pneumococcal polysaccharide vaccine (PPSV-23)              Zoster (Shingles) Vaccines (Series Information) Completed      2019  Imm Admin: Zoster Vaccine Recombinant (RZV) (SHINGRIX)    2019  Imm Admin: Zoster Vaccine Recombinant (RZV) (SHINGRIX)    2015  Imm Admin: Zoster Vaccine Live (ZVL) (Zostavax) - HISTORICAL DATA              Hepatitis A Vaccine (Hep A) (Series Information) Aged Out      2021  Imm Admin: Hepatitis A Vaccine, Adult    2018  Imm Admin: Hepatitis A Vaccine, Adult              Hepatitis C Screening  Tentatively Complete      2022  Outside Procedure: HEP C VIRUS ANTIBODY    2020  Hepatitis C Antibody component of HCV Scrn ( 3344-7318 1xLife)              Influenza Vaccine (Series Information) Completed      2023  Imm Admin: Influenza Vaccine Adult HD    2022  Imm Admin: Influenza Vaccine Adult HD    10/01/2021  Imm Admin: Influenza, Unspecified - HISTORICAL DATA    2021  Imm Admin: Influenza Vaccine Adult HD    10/07/2020  Imm Admin: Influenza  Vaccine Adult HD    Only the first 5 history entries have been loaded, but more history exists.              COVID-19 Vaccine (Series Information) Completed      09/29/2023  Imm Admin: PFIZER PURPLE CAP SARS-COV-2 VACCINATION (12+)    05/03/2023  Imm Admin: PFIZER BIVALENT SARS-COV-2 VACCINE (12+)    09/19/2022  Imm Admin: PFIZER BIVALENT SARS-COV-2 VACCINE (12+)    07/16/2022  Imm Admin: MODERNA SARS-COV-2 VACCINE (12+)    01/21/2022  Imm Admin: PFIZER PURPLE CAP SARS-COV-2 VACCINATION (12+)    Only the first 5 history entries have been loaded, but more history exists.              HPV Vaccines (Series Information) Aged Out      No completion history exists for this topic.              Meningococcal Immunization (Series Information) Aged Out      No completion history exists for this topic.              Discontinued - Polio Vaccine (Inactivated Polio)  Discontinued      12/14/2011  Imm Admin: IPV                    Patient Care Team:  Martina Grajeda M.D. as PCP - General (Family Medicine)  Yadira Jaffe, PT, MPT, OCS (Inactive) as Physical Therapist (Physical Therapy)  Neil Scott M.D. (Obstetrics & Gynecology)  Neelam Montalvo M.D. as Consulting Physician (Cardiovascular Disease (Cardiology))  Juan Dudley M.D. as Consulting Physician (Orthopedic Surgery)  Jose Ferrer M.D. as Consulting Physician (Gastroenterology)  Lidia Hsieh M.D. (Nephrology)      Social History     Tobacco Use    Smoking status: Never    Smokeless tobacco: Never   Vaping Use    Vaping Use: Never used   Substance Use Topics    Alcohol use: Yes     Alcohol/week: 1.2 oz     Types: 1 Glasses of wine, 1 Cans of beer per week     Comment: 2 drinks per week on average     Drug use: No     Family History   Problem Relation Age of Onset    Heart Disease Mother         A fib    Stroke Father     Cancer Father         Melanoma    Cancer Maternal Grandmother         Uterine    Cancer Maternal Grandfather         Lung    Cancer  "Sister         Melanoma    Cancer Brother         Melanoma    Heart Disease Brother         A fib    Cancer Brother         Melanoma    Ovarian Cancer Neg Hx     Tubal Cancer Neg Hx     Peritoneal Cancer Neg Hx     Colorectal Cancer Neg Hx     Breast Cancer Neg Hx      She  has a past medical history of Allergy, Arrhythmia, Arthritis, Backpain, Blood transfusion without reported diagnosis, Bowel obstruction (HCC), Cancer (HCC), Heart murmur, Hypertension, Indigestion, Kidney disease, Osteoporosis, Other specified symptom associated with female genital organs, Ovarian cancer (HCC), and Schwannoma of cranial nerve (HCC).   Past Surgical History:   Procedure Laterality Date    PB EXC LINK/VAS MAL SFT TIS HAND/FNGR SUBFASC* Right 08/04/2023    Procedure: RIGHT SMALL FINGER MASS EXCISION;  Surgeon: Yudelka Juan M.D.;  Location: [facility] [place];  Service: Orthopedics    HYSTERECTOMY RADICAL  2012    OOPHORECTOMY Bilateral 2012    FUSION, SPINE, LUMBAR, PLIF  2001    CRANIOTOMY  1993    Schwannoma L 5th CN    EYE SURGERY         Exam:   /60 (BP Location: Left arm, Patient Position: Sitting, BP Cuff Size: Adult)   Pulse 60   Temp 36.4 °C (97.6 °F) (Temporal)   Ht 1.702 m (5' 7\")   Wt 62.3 kg (137 lb 6.4 oz)   SpO2 98%  Body mass index is 21.52 kg/m².    Hearing good.    Dentition fair  Alert, oriented in no acute distress.  Eye contact is good, speech goal directed, affect calm    Assessment and Plan. The following treatment and monitoring plan is recommended:      1. Encounter for Medicare annual wellness exam  Padmini is a pleasant 73 year old woman here today for her Medicare annual visit.  She has no acute concerns.  Healthcare maintenance is up-to-date.    2. Chemotherapy-induced neuropathy (HCC)  Chronic, stable.  She does have neuropathy secondary to chemotherapy.  She reports the symptoms are stable and we will continue to monitor.    3. Stage 3a chronic kidney disease " (HCC)  Chronic, stable.  This is monitored and managed by nephrology.  She will follow-up with them as directed.    4. Mixed stress and urge urinary incontinence  Chronic, stable.  She does have a history of mixed urinary incontinence.  She is established with urogynecology who referred her to pelvic floor therapy which she has been doing.  She is finding it somewhat helpful.  She will follow-up with urogynecology as directed.    5. Age-related osteoporosis without current pathological fracture  Chronic, stable.  This is managed by [facility] endocrinologist who has her on Prolia.  She will continue the Prolia every 6 months and follow-up with endocrinology as directed.    6. Primary hypertension  Chronic, controlled.  Blood pressure is at goal under 140/90 in the office today.  We will continue the current regimen.  - Atenolol 25 mg daily    7. Pure hypercholesterolemia  Chronic, stable.  She does have a history of elevated cholesterol but a coronary calcium score from May, 2023 was 0 so she does not need to start a statin at this time.  We will continue to monitor.    8. Chronic diarrhea  9. Pancreatic insufficiency  Chronic, uncontrolled.  Ever since treatment for her ovarian cancer she is been struggling with intermittent diarrhea.  She was started on Creon by her [facility] gastroenterologist and initially was helpful but no longer helpful.  She will follow-up with GI as directed.  In the meantime, she would like to establish with a local GI doctor as well, so referral has been placed.  - Referral to Gastroenterology    10. Hot flashes  Chronic, controlled.  She is on an SSRI for hot flashes and she reports it continues to work very well to manage her symptoms.  She will continue current regimen.  - Escitalopram 10 mg daily      Services suggested: No services needed at this time  Health Care Screening: Age-appropriate preventive services recommended by USPTF and ACIP covered by Medicare were discussed today. Services  ordered if indicated and agreed upon by the patient.  Referrals offered: Community-based lifestyle interventions to reduce health risks and promote self-management and wellness, fall prevention, nutrition, physical activity, tobacco-use cessation, weight loss, and mental health services as per orders if indicated.    Discussion today about general wellness and lifestyle habits:    Prevent falls and reduce trip hazards; Cautioned about securing or removing rugs.  Have a working fire alarm and carbon monoxide detector;   Engage in regular physical activity and social activities     Follow-up: Return in about 6 months (around 5/20/2024) for Med check.

## 2023-11-20 NOTE — PATIENT INSTRUCTIONS
Today, your Healthcare Provider may have discussed the following recommendations:    1. Exercise and Physical Activity  According to the American Heart Association, it is recommended to engage in physical activity regularly and to aim for 150 minutes of moderate-intensity aerobic activity per week.  Your Healthcare Provider may have recommended taking the stairs instead of the elevator, starting or maintaining a walking program or strength-training program.    2. Emotional Well-being  Mental and emotional well-being is essential to overall health.  Your Healthcare Provider may have encouraged you to build strong, positive relationships with family and friends, become more involved in your community (by volunteering or joining a spiritual community), or focus on self-care.    3. Fall and Injury Prevention  To prevent falls and injuries and also improve your balance, your Healthcare Provider may have suggested that you use a cane or walker, start an exercise of physical therapy program, or have your vision and/or hearing tested.    4. Urinary Leakage (Urinary Incontinence)  To control or manage the leakage of urine, your Healthcare Provider may have recommended you start bladder training exercises (such as Kegel exercises), a trial of a medication or a referral to see a specialist to discuss surgical options.    5. Chronic Conditions  Your chronic conditions and any pertinent labs associated with those conditions were reviewed.    6. Medication List  Your medication list was reviewed with the medical assistant. If there were any changes, this was also discussed with your provider.    7. Health Maintenance and Preventive Care  If you had any overdue preventive care topics, these were reviewed and ordered for you.   
unknown

## 2023-12-09 ENCOUNTER — HOSPITAL ENCOUNTER (OUTPATIENT)
Dept: LAB | Facility: MEDICAL CENTER | Age: 73
End: 2023-12-09
Attending: INTERNAL MEDICINE
Payer: MEDICARE

## 2023-12-09 ENCOUNTER — HOSPITAL ENCOUNTER (OUTPATIENT)
Dept: LAB | Facility: MEDICAL CENTER | Age: 73
End: 2023-12-09
Attending: FAMILY MEDICINE
Payer: MEDICARE

## 2023-12-09 DIAGNOSIS — Z85.43 HISTORY OF OVARIAN CANCER: ICD-10-CM

## 2023-12-09 LAB
ANION GAP SERPL CALC-SCNC: 9 MMOL/L (ref 7–16)
APPEARANCE UR: CLEAR
BILIRUB UR QL STRIP.AUTO: NEGATIVE
BUN SERPL-MCNC: 28 MG/DL (ref 8–22)
CALCIUM SERPL-MCNC: 9.3 MG/DL (ref 8.5–10.5)
CANCER AG125 SERPL-ACNC: 14.9 U/ML (ref 0–35)
CHLORIDE SERPL-SCNC: 102 MMOL/L (ref 96–112)
CO2 SERPL-SCNC: 27 MMOL/L (ref 20–33)
COLOR UR: YELLOW
CREAT SERPL-MCNC: 1.38 MG/DL (ref 0.5–1.4)
CREAT UR-MCNC: 58.45 MG/DL
ERYTHROCYTE [DISTWIDTH] IN BLOOD BY AUTOMATED COUNT: 48 FL (ref 35.9–50)
GFR SERPLBLD CREATININE-BSD FMLA CKD-EPI: 41 ML/MIN/1.73 M 2
GLUCOSE SERPL-MCNC: 88 MG/DL (ref 65–99)
GLUCOSE UR STRIP.AUTO-MCNC: NEGATIVE MG/DL
HCT VFR BLD AUTO: 44.2 % (ref 37–47)
HGB BLD-MCNC: 14.2 G/DL (ref 12–16)
KETONES UR STRIP.AUTO-MCNC: NEGATIVE MG/DL
LEUKOCYTE ESTERASE UR QL STRIP.AUTO: NEGATIVE
MCH RBC QN AUTO: 29.8 PG (ref 27–33)
MCHC RBC AUTO-ENTMCNC: 32.1 G/DL (ref 32.2–35.5)
MCV RBC AUTO: 92.9 FL (ref 81.4–97.8)
MICRO URNS: NORMAL
NITRITE UR QL STRIP.AUTO: NEGATIVE
PH UR STRIP.AUTO: 6.5 [PH] (ref 5–8)
PLATELET # BLD AUTO: 334 K/UL (ref 164–446)
PMV BLD AUTO: 9.9 FL (ref 9–12.9)
POTASSIUM SERPL-SCNC: 4.3 MMOL/L (ref 3.6–5.5)
PROT UR QL STRIP: NEGATIVE MG/DL
PROT UR-MCNC: 4 MG/DL (ref 0–15)
PROT/CREAT UR: 68 MG/G (ref 10–107)
PTH-INTACT SERPL-MCNC: 58.7 PG/ML (ref 14–72)
RBC # BLD AUTO: 4.76 M/UL (ref 4.2–5.4)
RBC UR QL AUTO: NEGATIVE
SODIUM SERPL-SCNC: 138 MMOL/L (ref 135–145)
SP GR UR STRIP.AUTO: 1.01
UROBILINOGEN UR STRIP.AUTO-MCNC: 0.2 MG/DL
WBC # BLD AUTO: 4.3 K/UL (ref 4.8–10.8)

## 2023-12-09 PROCEDURE — 80048 BASIC METABOLIC PNL TOTAL CA: CPT

## 2023-12-09 PROCEDURE — 82570 ASSAY OF URINE CREATININE: CPT

## 2023-12-09 PROCEDURE — 84156 ASSAY OF PROTEIN URINE: CPT

## 2023-12-09 PROCEDURE — 86304 IMMUNOASSAY TUMOR CA 125: CPT

## 2023-12-09 PROCEDURE — 81003 URINALYSIS AUTO W/O SCOPE: CPT

## 2023-12-09 PROCEDURE — 36415 COLL VENOUS BLD VENIPUNCTURE: CPT

## 2023-12-09 PROCEDURE — 83970 ASSAY OF PARATHORMONE: CPT

## 2023-12-09 PROCEDURE — 85027 COMPLETE CBC AUTOMATED: CPT

## 2023-12-14 ENCOUNTER — APPOINTMENT (OUTPATIENT)
Dept: MEDICAL GROUP | Facility: PHYSICIAN GROUP | Age: 73
End: 2023-12-14
Payer: MEDICARE

## 2023-12-15 ENCOUNTER — OFFICE VISIT (OUTPATIENT)
Dept: NEPHROLOGY | Facility: MEDICAL CENTER | Age: 73
End: 2023-12-15
Payer: MEDICARE

## 2023-12-15 VITALS
BODY MASS INDEX: 21.97 KG/M2 | WEIGHT: 140 LBS | OXYGEN SATURATION: 98 % | DIASTOLIC BLOOD PRESSURE: 62 MMHG | SYSTOLIC BLOOD PRESSURE: 102 MMHG | HEIGHT: 67 IN | TEMPERATURE: 97.9 F | RESPIRATION RATE: 16 BRPM | HEART RATE: 64 BPM

## 2023-12-15 DIAGNOSIS — N18.31 STAGE 3A CHRONIC KIDNEY DISEASE: ICD-10-CM

## 2023-12-15 PROCEDURE — 3074F SYST BP LT 130 MM HG: CPT | Performed by: INTERNAL MEDICINE

## 2023-12-15 PROCEDURE — 3078F DIAST BP <80 MM HG: CPT | Performed by: INTERNAL MEDICINE

## 2023-12-15 PROCEDURE — 99214 OFFICE O/P EST MOD 30 MIN: CPT | Performed by: INTERNAL MEDICINE

## 2023-12-15 ASSESSMENT — FIBROSIS 4 INDEX: FIB4 SCORE: 1.14

## 2023-12-15 NOTE — PROGRESS NOTES
NEPHROLOGY HISTORY AND PHYSICAL CONSULT NOTE       HPI:  Zena Paz is a 72 y.o. female ovarian cancer, who presents for follow up.     Patient remains on calcium citrate 950 mg daily as well as vitamin D colecalciferol 2000 units daily.    Patient reports at that the renal function may have been due to prolonged hiking with limited water intake.     UPCR 68mg/g. Recent creatinine 1.38 mg/dL.    Continues to take tylenol, and avoid ibuprofen.     Outpatient Encounter Medications as of 12/15/2023   Medication Sig Dispense Refill    atenolol (TENORMIN) 25 MG Tab Take 1 Tablet by mouth every evening. 90 Tablet 3    escitalopram (LEXAPRO) 10 MG Tab TAKE 1 TABLET BY MOUTH EVERY MORNING 90 Tablet 3    CREON 03944-62930 units Cap DR Particles TAKE 3 CAPSULES BY MOUTH THREE TIMES DAILY WITH MEALS      LORazepam (ATIVAN) 1 MG Tab Take 0.5 mg by mouth. ONCE IN AWHILE. MAYBE A COUPLE TIMES OF YEAR      denosumab (PROLIA) 60 MG/ML Solution Prefilled Syringe injection Inject 60 mg under the skin. Twice a year      estrogens, conjugated (PREMARIN) 0.625 MG/GM Cream Insert 0.5 g into the vagina every day.      fluticasone (FLONASE) 50 MCG/ACT nasal spray Spray 1 Spray in nose every day.      Ayr Saline Nasal Gel Apply  to affected nostril(s). ONCE IN AWHILE IF NEEDED. BEEN ABOUT A YEAR      ondansetron (ZOFRAN) 8 MG Tab Take 8 mg by mouth.      calcium citrate (CALCITRATE) 950 MG Tab Take 950 mg by mouth every day.      polyethyl glycol-propyl glycol (SYSTANE) 0.4-0.3 % Solution Place 1 Drop in both eyes every morning.      vitamin D (CHOLECALCIFEROL) 1000 UNIT TABS Take 2,000 Units by mouth every day.      Multiple Vitamins-Minerals (SENIOR MULTIVITAMIN PLUS PO) Take 1 Tab by mouth every morning.      docosahexanoic acid (OMEGA 3 FA) 1000 MG CAPS Take 1,000 mg by mouth every morning.       No facility-administered encounter medications on file as of 12/15/2023.        Allergies   Allergen Reactions    Latex Rash      "Rash      Losartan      palpitations    Tetracycline      Sensitivity to sun; sun burns.  Reaction date; 15 years ago from 2013.       ROS    /62 (BP Location: Right arm, Patient Position: Sitting, BP Cuff Size: Adult)   Pulse 64   Temp 36.6 °C (97.9 °F) (Temporal)   Resp 16   Ht 1.702 m (5' 7\")   Wt 63.5 kg (140 lb)   SpO2 98%   BMI 21.93 kg/m²     Physical Exam  GEN: alert and oriented. In no acute distress.   HEENT: moist oropharyngeal mucous membranes  CV:RRR  PULM: clear to auscultation bilaterally  ABD: soft non tender non distended  EXT: warm well perfused, no lower extremity edema.    Labs reviewed.  Recent Labs     02/15/23  1001 08/18/23  0830 12/09/23  0741   ALBUMIN  --  4.3  --    HDL 75  --   --    TRIGLYCERIDE 95  --   --    SODIUM 139 139 138   POTASSIUM 4.9 4.4 4.3   CHLORIDE 103 101 102   CO2 28 25 27   BUN 24* 25* 28*   CREATININE 1.17 1.20 1.38   PHOSPHORUS  --  4.2  --        Lab Results   Component Value Date/Time    WBC 4.3 (L) 12/09/2023 07:41 AM    RBC 4.76 12/09/2023 07:41 AM    HEMOGLOBIN 14.2 12/09/2023 07:41 AM    HEMATOCRIT 44.2 12/09/2023 07:41 AM    MCV 92.9 12/09/2023 07:41 AM    MCH 29.8 12/09/2023 07:41 AM    MCHC 32.1 (L) 12/09/2023 07:41 AM    MPV 9.9 12/09/2023 07:41 AM              URINALYSIS:  Lab Results   Component Value Date/Time    COLORURINE Yellow 12/09/2023 0741    CLARITY Clear 12/09/2023 0741    SPECGRAVITY 1.012 12/09/2023 0741    PHURINE 6.5 12/09/2023 0741    KETONES Negative 12/09/2023 0741    PROTEINURIN Negative 12/09/2023 0741    BILIRUBINUR Negative 12/09/2023 0741    UROBILU 0.2 12/09/2023 0741    NITRITE Negative 12/09/2023 0741    LEUKESTERAS Negative 12/09/2023 0741    OCCULTBLOOD Negative 12/09/2023 0741     [facility] [place]  [place] Results   Component Value Date/Time    TOTPROTUR 4.0 12/09/2023 0741      Lab Results   Component Value Date/Time    CREATININEU 58.45 12/09/2023 0741           Assessment:  Zena Paz is a 72 y.o. female ovarian " malignancy, who presents for follow up.     Chronic kidney disease stage IIIa-moderate decrease in  Renal function remains stable with a corresponding EGFR of 40 mL/min per 1.73 m from 48 to 50 mL/min per 1.73 m² earlier this year.  ².  Urinalysis bland. Negative for microalbuminuria.  -Urinalysis remains bland   -MR Abdomen negative for hydronephrosis.   -Routine management of hypertension  - Dose all medications for patient level of renal function  - Avoid nephrotoxic agents including contrast and NSAIDs  - Encourage adequate hydration.  - Continue to monitor renal function.  Obtain BMP, urine studies including urine protein quantification.     2. HTN -soft blood pressures here in clinic.today 102/62.  Continue atenolol. Patient to record blood pressure and bring readings into next clinic visit. Low sodium diet.     3. Anemia -hemoglobin remained stable.  No indication for erythropoiesis stimulating agents in the setting of malignancy.      5. Premature Atrial Contraction. - atenolol. Continue to onitor.     6.  Vitamin D deficiency-continue cholecalciferol 2000 units daily.     7.  Ovarian cancer, stage IIIc high-grade serous status post surgery and chemotherapy.continue to monitor CEA levels.  Concluded surveillance imaging.  Appreciate recommendations from [facility] oncology.  Return to clinic in 4 months.    8.  Urinary incontinence-continue to follow-up with urology.    Return to clinic in 8 weeks.    Lidia Hsieh MD  Nephrology  [facility]

## 2023-12-18 ENCOUNTER — OFFICE VISIT (OUTPATIENT)
Dept: DERMATOLOGY | Facility: IMAGING CENTER | Age: 73
End: 2023-12-18
Payer: MEDICARE

## 2023-12-18 DIAGNOSIS — L82.1 SK (SEBORRHEIC KERATOSIS): ICD-10-CM

## 2023-12-18 DIAGNOSIS — D18.01 CHERRY ANGIOMA: ICD-10-CM

## 2023-12-18 DIAGNOSIS — L73.8 SEBACEOUS HYPERPLASIA: ICD-10-CM

## 2023-12-18 DIAGNOSIS — D22.9 MULTIPLE NEVI: ICD-10-CM

## 2023-12-18 DIAGNOSIS — L81.4 LENTIGINES: ICD-10-CM

## 2023-12-18 DIAGNOSIS — Z12.83 SKIN CANCER SCREENING: ICD-10-CM

## 2023-12-18 PROCEDURE — 99212 OFFICE O/P EST SF 10 MIN: CPT | Performed by: NURSE PRACTITIONER

## 2023-12-18 NOTE — PROGRESS NOTES
DERMATOLOGY NOTE  FOLLOW UP VISIT       Chief complaint: Follow-Up (ROBERT)    Denies new, growing, changing, itching or bleeding skin lesions today.    History of skin cancer: Yes, Details: possible bcc to right cheek. No excision. approx 2015  History of precancers/actinic keratoses: Yes, Details: yes  History of biopsies:Yes, Details: moles, atypical, DF on arm  History of blistering/severe sunburns:Yes, Details: as a child  Family history of skin cancer:Yes, Details: Father and 3 siblings with melanoma     Allergies   Allergen Reactions    Latex Rash     Rash      Losartan      palpitations    Tetracycline      Sensitivity to sun; sun burns.  Reaction date; 15 years ago from 2013.        MEDICATIONS:  Medications relevant to specialty reviewed.     REVIEW OF SYSTEMS:   Positive for skin (see HPI)  Negative for fevers and chills       EXAM:  There were no vitals taken for this visit.  Constitutional: Well-developed, well-nourished, and in no distress.     A total body skin exam was performed excluding the genitals per patient preference and including the following areas: head (including face), neck, chest, abdomen, groin/buttocks, back, bilateral upper extremities, and bilateral lower extremities with the following pertinent findings listed below and/or in assessment/plan.     -sun exposed skin of trunk and b/l upper, lower extremities and face with scattered clinically benign light brown reticulated macules all of which were morphologically similar and none of which were suspicious for skin cancer today on exam    -Several scattered 1-3mm bright red macules and thin papules on the trunk and extremities    -Multiple tan medium brown skin-colored macules papules scattered over the trunk >> extremities--All with benign-appearing pigment network patterns on dermoscopy    -Several tan medium brown stuck-on waxy papules scattered on the trunk and extremities    -Scattered sebaceous hyperplasia on the face    IMPRESSION /  PLAN:    1. Lentigines  - Benign-appearing nature of lesions discussed during exam.   - Advised to continue to monitor for any return to clinic for new or concerning changes.      2. Cherry angioma  - Benign-appearing nature of lesions discussed during exam.   - Advised to continue to monitor for any return to clinic for new or concerning changes.      3. Multiple nevi  - Benign-appearing nature of lesions discussed during exam.   - Advised to continue to monitor for any return to clinic for new or concerning changes.      4. SK (seborrheic keratosis)  - Benign-appearing nature of lesions discussed during exam.   - Advised to continue to monitor for any return to clinic for new or concerning changes.      5. Sebaceous hyperplasia  - Benign-appearing nature of lesions discussed during exam.   - Advised to continue to monitor for any return to clinic for new or concerning changes.      6. Skin cancer screening  Skin cancer education  discussed importance of sun protective clothing, eyewear in addition to the use of broad spectrum sunscreen with SPF 30 or greater, as well as need for reapplication ~every 2 hours when exposed to UVR  discussed importance following up for any new or changing lesions as noted in handout given, but every 12 months exams in clinic in the setting of dermatologic history  ABCDE's of melanoma discussed/handout given        I have performed a physical exam and reviewed and updated ROS and Plan today (12/18/2023). In review of dermatology visit (12/15/2022), there are no changes except as documented above.       Please note that this dictation was created using voice recognition software. I have made every reasonable attempt to correct obvious errors, but I expect that there are errors of grammar and possibly content that I did not discover before finalizing the note.      Return to clinic in: Return in about 1 year (around 12/18/2024) for ROBERT. and as needed for any new or changing skin  lesions.

## 2024-01-31 RX ORDER — ATENOLOL 25 MG/1
25 TABLET ORAL EVERY EVENING
Qty: 90 TABLET | Refills: 0 | Status: SHIPPED | OUTPATIENT
Start: 2024-01-31 | End: 2024-03-22

## 2024-02-01 NOTE — TELEPHONE ENCOUNTER
Received request via: Pharmacy    Was the patient seen in the last year in this department? Yes    Does the patient have an active prescription (recently filled or refills available) for medication(s) requested? No    [facility]: [facility]    Does the patient have half-way Plus and need 100 day supply (blood pressure, diabetes and cholesterol meds only)? Patient does not have SCP

## 2024-02-09 ENCOUNTER — GYNECOLOGY VISIT (OUTPATIENT)
Dept: GYNECOLOGY | Facility: CLINIC | Age: 74
End: 2024-02-09
Payer: MEDICARE

## 2024-02-09 VITALS
HEART RATE: 60 BPM | SYSTOLIC BLOOD PRESSURE: 116 MMHG | WEIGHT: 138 LBS | DIASTOLIC BLOOD PRESSURE: 76 MMHG | BODY MASS INDEX: 21.61 KG/M2

## 2024-02-09 DIAGNOSIS — R39.9 UTI SYMPTOMS: ICD-10-CM

## 2024-02-09 PROCEDURE — 99213 OFFICE O/P EST LOW 20 MIN: CPT | Performed by: STUDENT IN AN ORGANIZED HEALTH CARE EDUCATION/TRAINING PROGRAM

## 2024-02-09 PROCEDURE — 3078F DIAST BP <80 MM HG: CPT | Performed by: STUDENT IN AN ORGANIZED HEALTH CARE EDUCATION/TRAINING PROGRAM

## 2024-02-09 PROCEDURE — 3074F SYST BP LT 130 MM HG: CPT | Performed by: STUDENT IN AN ORGANIZED HEALTH CARE EDUCATION/TRAINING PROGRAM

## 2024-02-09 ASSESSMENT — FIBROSIS 4 INDEX: FIB4 SCORE: 1.14

## 2024-02-09 NOTE — PROGRESS NOTES
Urogynecology and Pelvic Reconstructive Surgery Follow Up    Padmini Paz [id number]    Referred by: Martina Grajeda MD    Reason for Visit:   Chief Complaint   Patient presents with    Other     Fv         Subjective     History of Presenting Illness:    Zena Paz is a 73 y.o. year old with urinary incontinence presents for follow-up.  She was last seen in 2023    She previously opted for urethral bulking but this has not yet been scheduled.    She has been working with Marina Hearn DPT - this has helped incontinence around the house, but she still has leakage while hiking downhill, which is bothersome    There is interest in pursuing bulking. She is traveling in the future so will want to book around these.     Initial HPI: She was referred by Dr. Grajeda for the evaluation and management of urinary incontinence.     She is already had an evaluation by urogynecology experts at [facility] (Dr. Fajardo), eluding urodynamics.  These found stress predominant urinary incontinence, but since moving to [place] she would like to have evaluation and treatment closer to home.    Her past medical history is notable for stage IIIc ovarian cancer, treated in  with staging and cisplatin chemotherapy without radiation therapy or other surgeries.  She is currently in remission    Other notable history includes history of a schwannoma excision, and L5-S1 lumbar fusion.  She also has peripheral neuropathy from her cisplatin chemotherapy.      Prior Pelvic surgery:    total abdominal hysterectomy, bilateral salpingo-oophorectomy with staging including pelvic.  Lymph node dissection ([facility])     Prior treatment:   Vaginal estrogen, current  Pilates  Online pelvic exercies.   Pelvic PT (Marina Hearn)       Pelvic floor symptom review:     Bladder:   Voids per day: 12 Voids per night: 1-2      Urinary incontinence episodes per day: varies with activity    Urge leakage:  On Movement to Bathroom and Full  Bladder   Stress leakage: With Cough, With Laugh, With Exercise, and With Bending/Squatting   Continuous / insensible urine loss: No    Nocturnal enuresis: No    Leakage volume: Moderate   Number of pads/day: 2-3    Bladder emptying: Complete   Voiding symptoms: Post-Void Dribble   UTI in last 12 months: 1   Other urologic history: none      Prolapse:     Prolapse symptoms: None      Bowel:    Diarrhea predominant, takes creon   Straining to empty bowels: No    Splinting to evacuate: No    Painful evacuation: No    Difficulty emptying rectum: No    Incontinence to stool: yes, occasional with diarrhea   Blood in stool: No    Hemorrhoids: Yes   Bowel conditions: no   Most recent colonoscopy: 2022       Sexual function:    Sexually active: Yes   Gender of partners: Male   Pain with intercourse: dryness          Past medical and surgical history    Past obstetric history: nulligravida     Past gynecological history:    Last menstrual period: No LMP recorded. Patient has had a hysterectomy. For ovarian cancer, in remission       Past medical history:  Past Medical History:   Diagnosis Date    Allergy     Arrhythmia     Arthritis     Backpain     had back surgery, s1-l5 fusion    Blood transfusion without reported diagnosis     Bowel obstruction (HCC)     Cancer (HCC)     overian cancer    Heart murmur     Hypertension     Indigestion     sometimes    Kidney disease     Osteoporosis     Other specified symptom associated with female genital organs     had ca ov the ovary    Ovarian cancer (HCC)     Schwannoma of cranial nerve (HCC)      Past surgical history:  Past Surgical History:   Procedure Laterality Date    PB EXC LINK/VAS MAL SFT TIS HAND/FNGR SUBFASC* Right 08/04/2023    Procedure: RIGHT SMALL FINGER MASS EXCISION;  Surgeon: Yudelka Juan M.D.;  Location: [facility];  Service: Orthopedics    HYSTERECTOMY RADICAL  2012    OOPHORECTOMY Bilateral 2012    FUSION, SPINE, LUMBAR, PLIF   2001    CRANIOTOMY  1993    Schwannoma L 5th CN    EYE SURGERY       Medications:has a current medication list which includes the following prescription(s): atenolol, escitalopram, creon, lorazepam, denosumab, premarin, fluticasone, ayr saline nasal, ondansetron, calcium citrate, polyethyl glycol-propyl glycol, vitamin d, multiple vitamins-minerals, and docosahexanoic acid.  Allergies:Latex, Losartan, and Tetracycline  Family history:  Family History   Problem Relation Age of Onset    Heart Disease Mother         A fib    Stroke Father     Cancer Father         Melanoma    Cancer Maternal Grandmother         Uterine    Cancer Maternal Grandfather         Lung    Cancer Sister         Melanoma    Cancer Brother         Melanoma    Heart Disease Brother         A fib    Cancer Brother         Melanoma    Ovarian Cancer Neg Hx     Tubal Cancer Neg Hx     Peritoneal Cancer Neg Hx     Colorectal Cancer Neg Hx     Breast Cancer Neg Hx      Social history: reports that she has never smoked. She has never used smokeless tobacco. She reports current alcohol use of about 1.2 oz of alcohol per week. She reports that she does not use drugs.    Review of systems: A full review of systems was performed, and negative with the exception of want is noted above in the HPI.        Objective        /76 (BP Location: Left arm, Patient Position: Sitting, BP Cuff Size: Adult)   Pulse 60   Wt 138 lb   BMI 21.61 kg/m²     Physical Exam  Vitals reviewed. Exam conducted with a chaperone present.   Constitutional:       Appearance: Normal appearance.   HENT:      Head: Normocephalic.      Mouth/Throat:      Mouth: Mucous membranes are moist.   Cardiovascular:      Rate and Rhythm: Normal rate.   Pulmonary:      Effort: Pulmonary effort is normal.   Skin:     General: Skin is warm and dry.   Neurological:      Mental Status: She is alert.   Psychiatric:         Mood and Affect: Mood normal.         Procedure Performed: No    Diagnostic  test and records review:    Urine dipstick: neg     Post-void residual: prior -13 mL, performed by Bladder Scanner    Labs: n/a    Radiology:     Procedural:     Urodynamics 5/20/2022: (Performed/evaluated by Dr. Fajardo at [facility], report available, no graphs)  Mixed incontinence, stress greater than urge.  Stress incontinence demonstrated    Documentation reviewed: Prior EMR Records           Assessment & Plan     Zena Paz is a 73 y.o. year old P0 with predominant mixed urinary incontinence.    1. Urinary incontinence, mixed  - continue with PT (Marina Pieters DPT)  - She still desires bulking procedure.  To repeat conversation about the general success rates of urethral bulking.  She is an optimal candidate for this due to her mild to moderate stress urinary incontinence and improvement with physical therapy but with continued stress base leakage.  She understands the differences between a sling and a bulking.  She understands that bulking overall has lower success rates but most patients are very satisfied with the outcomes, 1 in 4 patients require 2 treatments in order to get optimal improvement.  Occasionally, people do not respond to this treatment.  This is a same-day procedure, I 1 and 5 risk of temporary catheterization, and she may have a few days of burning or difficulty emptying her bladder.  Would not recommend traveling until at least 2 to 3 weeks after the procedure in order to ensure there are no problems during her travels.  -Would like to move forward with scheduling: Urethral bulking  -Declines preop visit, she is happy with her current counseling    2. Atrophic vaginitis  She has vaginal atrophy on examination. Reviewed risks, benefits, and indications for vaginal estrogen therapy.  Continue with vaginal estrogen therapy twice weekly.     3. UTI symptoms  - Culture sent, treat based off results    4. 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  Currently in remission, care at [facility].   - Prior Referral to Gynecologic Oncology to establish care locally (Win/[place])                 Noel Martínez MD, FACOG  Urogynecology and Pelvic Reconstructive Surgery  Department of Obstetrics and Gynecology  [facility]      This medical record contains text that has been entered with the assistance of computer voice recognition and dictation software.  Therefore, it may contain unintended errors in text, spelling, punctuation, or grammar

## 2024-02-16 ENCOUNTER — PATIENT MESSAGE (OUTPATIENT)
Dept: HEALTH INFORMATION MANAGEMENT | Facility: OTHER | Age: 74
End: 2024-02-16

## 2024-02-27 ENCOUNTER — HOSPITAL ENCOUNTER (OUTPATIENT)
Dept: LAB | Facility: MEDICAL CENTER | Age: 74
End: 2024-02-27
Attending: INTERNAL MEDICINE
Payer: MEDICARE

## 2024-02-27 DIAGNOSIS — N18.9 CHRONIC KIDNEY DISEASE, UNSPECIFIED CKD STAGE: ICD-10-CM

## 2024-02-27 DIAGNOSIS — N18.31 STAGE 3A CHRONIC KIDNEY DISEASE: ICD-10-CM

## 2024-02-27 LAB
ANION GAP SERPL CALC-SCNC: 12 MMOL/L (ref 7–16)
APPEARANCE UR: CLEAR
BILIRUB UR QL STRIP.AUTO: NEGATIVE
BUN SERPL-MCNC: 28 MG/DL (ref 8–22)
CALCIUM SERPL-MCNC: 9.3 MG/DL (ref 8.5–10.5)
CHLORIDE SERPL-SCNC: 102 MMOL/L (ref 96–112)
CO2 SERPL-SCNC: 23 MMOL/L (ref 20–33)
COLOR UR: YELLOW
CREAT SERPL-MCNC: 1.15 MG/DL (ref 0.5–1.4)
CREAT UR-MCNC: 50.34 MG/DL
ERYTHROCYTE [DISTWIDTH] IN BLOOD BY AUTOMATED COUNT: 45.6 FL (ref 35.9–50)
GFR SERPLBLD CREATININE-BSD FMLA CKD-EPI: 51 ML/MIN/1.73 M 2
GLUCOSE SERPL-MCNC: 92 MG/DL (ref 65–99)
GLUCOSE UR STRIP.AUTO-MCNC: NEGATIVE MG/DL
HCT VFR BLD AUTO: 44.8 % (ref 37–47)
HGB BLD-MCNC: 15.1 G/DL (ref 12–16)
KETONES UR STRIP.AUTO-MCNC: NEGATIVE MG/DL
LEUKOCYTE ESTERASE UR QL STRIP.AUTO: NEGATIVE
MCH RBC QN AUTO: 30.3 PG (ref 27–33)
MCHC RBC AUTO-ENTMCNC: 33.7 G/DL (ref 32.2–35.5)
MCV RBC AUTO: 90 FL (ref 81.4–97.8)
MICRO URNS: NORMAL
MICROALBUMIN UR-MCNC: <1.2 MG/DL
MICROALBUMIN/CREAT UR: NORMAL MG/G (ref 0–30)
NITRITE UR QL STRIP.AUTO: NEGATIVE
PH UR STRIP.AUTO: 6.5 [PH] (ref 5–8)
PLATELET # BLD AUTO: 300 K/UL (ref 164–446)
PMV BLD AUTO: 10.2 FL (ref 9–12.9)
POTASSIUM SERPL-SCNC: 5 MMOL/L (ref 3.6–5.5)
PROT UR QL STRIP: NEGATIVE MG/DL
PTH-INTACT SERPL-MCNC: 80.9 PG/ML (ref 14–72)
RBC # BLD AUTO: 4.98 M/UL (ref 4.2–5.4)
RBC UR QL AUTO: NEGATIVE
SODIUM SERPL-SCNC: 137 MMOL/L (ref 135–145)
SP GR UR STRIP.AUTO: 1.01
UROBILINOGEN UR STRIP.AUTO-MCNC: 0.2 MG/DL
WBC # BLD AUTO: 4.4 K/UL (ref 4.8–10.8)

## 2024-02-27 PROCEDURE — 82043 UR ALBUMIN QUANTITATIVE: CPT

## 2024-02-27 PROCEDURE — 36415 COLL VENOUS BLD VENIPUNCTURE: CPT

## 2024-02-27 PROCEDURE — 80048 BASIC METABOLIC PNL TOTAL CA: CPT

## 2024-02-27 PROCEDURE — 82570 ASSAY OF URINE CREATININE: CPT

## 2024-02-27 PROCEDURE — 81003 URINALYSIS AUTO W/O SCOPE: CPT

## 2024-02-27 PROCEDURE — 83970 ASSAY OF PARATHORMONE: CPT

## 2024-02-27 PROCEDURE — 85027 COMPLETE CBC AUTOMATED: CPT

## 2024-02-29 ENCOUNTER — APPOINTMENT (OUTPATIENT)
Dept: LAB | Facility: MEDICAL CENTER | Age: 74
End: 2024-02-29
Payer: MEDICARE

## 2024-03-05 ENCOUNTER — OFFICE VISIT (OUTPATIENT)
Dept: NEPHROLOGY | Facility: MEDICAL CENTER | Age: 74
End: 2024-03-05
Payer: MEDICARE

## 2024-03-05 VITALS
DIASTOLIC BLOOD PRESSURE: 70 MMHG | HEIGHT: 68 IN | OXYGEN SATURATION: 97 % | HEART RATE: 57 BPM | RESPIRATION RATE: 16 BRPM | WEIGHT: 137 LBS | TEMPERATURE: 97.9 F | SYSTOLIC BLOOD PRESSURE: 118 MMHG | BODY MASS INDEX: 20.76 KG/M2

## 2024-03-05 DIAGNOSIS — N18.31 STAGE 3A CHRONIC KIDNEY DISEASE: ICD-10-CM

## 2024-03-05 PROCEDURE — 99214 OFFICE O/P EST MOD 30 MIN: CPT | Performed by: INTERNAL MEDICINE

## 2024-03-05 PROCEDURE — 3078F DIAST BP <80 MM HG: CPT | Performed by: INTERNAL MEDICINE

## 2024-03-05 PROCEDURE — 3074F SYST BP LT 130 MM HG: CPT | Performed by: INTERNAL MEDICINE

## 2024-03-05 ASSESSMENT — FIBROSIS 4 INDEX: FIB4 SCORE: 1.27

## 2024-03-05 NOTE — PROGRESS NOTES
NEPHROLOGY HISTORY AND PHYSICAL CONSULT NOTE      HPI:  Zena Paz is a 73 y.o. female Zena Paz is a 72 y.o. female ovarian malignancy, who presents for follow up.     Patient for evaluation of suspected chronic kidney disease stage IIIa.  GFR is continue to improve prior baseline of 51 mL/min per 1.73 m².    She continues to follow-up with [facility] for management of her ovarian cancer.      Outpatient Encounter Medications as of 3/5/2024   Medication Sig Dispense Refill    atenolol (TENORMIN) 25 MG Tab TAKE 1 TABLET BY MOUTH EVERY EVENING 90 Tablet 0    escitalopram (LEXAPRO) 10 MG Tab TAKE 1 TABLET BY MOUTH EVERY MORNING 90 Tablet 3    CREON 71837-53313 units Cap DR Particles TAKE 3 CAPSULES BY MOUTH THREE TIMES DAILY WITH MEALS      LORazepam (ATIVAN) 1 MG Tab Take 0.5 mg by mouth. ONCE IN AWHILE. MAYBE A COUPLE TIMES OF YEAR      denosumab (PROLIA) 60 MG/ML Solution Prefilled Syringe injection Inject 60 mg under the skin. Twice a year      estrogens, conjugated (PREMARIN) 0.625 MG/GM Cream Insert 0.5 g into the vagina every day.      fluticasone (FLONASE) 50 MCG/ACT nasal spray Spray 1 Spray in nose every day.      Ayr Saline Nasal Gel Apply  to affected nostril(s). ONCE IN AWHILE IF NEEDED. BEEN ABOUT A YEAR      ondansetron (ZOFRAN) 8 MG Tab Take 8 mg by mouth.      calcium citrate (CALCITRATE) 950 MG Tab Take 950 mg by mouth every day.      polyethyl glycol-propyl glycol (SYSTANE) 0.4-0.3 % Solution Place 1 Drop in both eyes every morning.      vitamin D (CHOLECALCIFEROL) 1000 UNIT TABS Take 2,000 Units by mouth every day.      Multiple Vitamins-Minerals (SENIOR MULTIVITAMIN PLUS PO) Take 1 Tab by mouth every morning.      docosahexanoic acid (OMEGA 3 FA) 1000 MG CAPS Take 1,000 mg by mouth every morning.       No facility-administered encounter medications on file as of 3/5/2024.        Allergies   Allergen Reactions    Latex Rash     Rash      Losartan      palpitations     "Tetracycline      Sensitivity to sun; sun burns.  Reaction date; 15 years ago from 2013.       ROS    /70 (BP Location: Right arm, Patient Position: Sitting, BP Cuff Size: Small adult)   Pulse (!) 57   Temp 36.6 °C (97.9 °F) (Temporal)   Resp 16   Ht 1.715 m (5' 7.5\")   Wt 62.1 kg (137 lb)   SpO2 97%   BMI 21.14 kg/m²     Physical Exam  GEN: alert and oriented. In no acute distress.   HEENT: moist oropharyngeal mucous membranes  CV:RRR  PULM: clear to auscultation bilaterally  ABD: soft non tender non distended  EXT: warm well perfused, no lower extremity edema.    Labs reviewed.  Recent Labs     08/18/23  0830 12/09/23  0741 02/27/24  0920   ALBUMIN 4.3  --   --    SODIUM 139 138 137   POTASSIUM 4.4 4.3 5.0   CHLORIDE 101 102 102   CO2 25 27 23   BUN 25* 28* 28*   CREATININE 1.20 1.38 1.15   PHOSPHORUS 4.2  --   --        Lab Results   Component Value Date/Time    WBC 4.4 (L) 02/27/2024 09:20 AM    RBC 4.98 02/27/2024 09:20 AM    HEMOGLOBIN 15.1 02/27/2024 09:20 AM    HEMATOCRIT 44.8 02/27/2024 09:20 AM    MCV 90.0 02/27/2024 09:20 AM    MCH 30.3 02/27/2024 09:20 AM    MCHC 33.7 02/27/2024 09:20 AM    MPV 10.2 02/27/2024 09:20 AM              URINALYSIS:  Lab Results   Component Value Date/Time    COLORURINE Yellow 02/27/2024 0920    CLARITY Clear 02/27/2024 0920    SPECGRAVITY 1.011 02/27/2024 0920    PHURINE 6.5 02/27/2024 0920    KETONES Negative 02/27/2024 0920    PROTEINURIN Negative 02/27/2024 0920    BILIRUBINUR Negative 02/27/2024 0920    UROBILU 0.2 02/27/2024 0920    NITRITE Negative 02/27/2024 0920    LEUKESTERAS Negative 02/27/2024 0920    OCCULTBLOOD Negative 02/27/2024 0920     UPC  Lab Results   Component Value Date/Time    TOTPROTUR 4.0 12/09/2023 0741      Lab Results   Component Value Date/Time    CREATININEU 58.45 12/09/2023 0741       Imaging report(s) reviewed  No orders to display         Assessment:  Zenabrian Paz is a 72 y.o. female ovarian malignancy, who presents for " follow up.      Chronic kidney disease stage IIIa-improvement. Thought to be related to prior chemotherapy in 2012.  Renal function remains stable with a corresponding EGFR of 40 mL/min per 1.73 m from 48 to 50 mL/min per 1.73 m² earlier this year.  ².  Urinalysis bland. Negative for microalbuminuria.  -Urinalysis remains bland   -MR Abdomen negative for hydronephrosis.   -Routine management of hypertension  - Dose all medications for patient level of renal function  - Avoid nephrotoxic agents including contrast and NSAIDs  - Encourage adequate hydration.  - Continue to monitor renal function.  Obtain BMP, urine studies including urine protein quantification.  -  Follow up as needed given improvement in renal function over the last few months without evidence of protienuria.      2. HTN - Well controlled with medications.  Continue atenolol. Patient to record blood pressure and bring readings into next clinic visit. Low sodium diet.     3. Anemia -hemoglobin remained stable.  No indication for erythropoiesis stimulating agents in the setting of malignancy.      5. Premature Atrial Contraction. - atenolol. Continue to monitor.      6.  Vitamin D deficiency-continue cholecalciferol 2000 units daily.     7.  Ovarian cancer, stage IIIc high-grade serous status post surgery and chemotherapy.continue to monitor CEA levels.  Concluded surveillance imaging.  Appreciate recommendations from [facility] oncology.      8.  Urinary incontinence-continue to follow-up with urology.        Lidia Hsieh MD  Nephrology  [facility] [place]

## 2024-03-15 ENCOUNTER — OUTPATIENT INFUSION SERVICES (OUTPATIENT)
Dept: ONCOLOGY | Facility: MEDICAL CENTER | Age: 74
End: 2024-03-15
Attending: FAMILY MEDICINE
Payer: MEDICARE

## 2024-03-15 VITALS
HEART RATE: 62 BPM | SYSTOLIC BLOOD PRESSURE: 107 MMHG | BODY MASS INDEX: 22.22 KG/M2 | WEIGHT: 138.23 LBS | DIASTOLIC BLOOD PRESSURE: 59 MMHG | HEIGHT: 66 IN | TEMPERATURE: 96.9 F | OXYGEN SATURATION: 99 % | RESPIRATION RATE: 18 BRPM

## 2024-03-15 DIAGNOSIS — M81.0 AGE-RELATED OSTEOPOROSIS WITHOUT CURRENT PATHOLOGICAL FRACTURE: ICD-10-CM

## 2024-03-15 PROCEDURE — 36415 COLL VENOUS BLD VENIPUNCTURE: CPT

## 2024-03-15 PROCEDURE — 96372 THER/PROPH/DIAG INJ SC/IM: CPT

## 2024-03-15 PROCEDURE — 700111 HCHG RX REV CODE 636 W/ 250 OVERRIDE (IP): Mod: JZ,JG | Performed by: FAMILY MEDICINE

## 2024-03-15 RX ORDER — EPINEPHRINE 1 MG/ML(1)
0.5 AMPUL (ML) INJECTION PRN
OUTPATIENT
Start: 2024-09-02

## 2024-03-15 RX ORDER — METHYLPREDNISOLONE SODIUM SUCCINATE 125 MG/2ML
125 INJECTION, POWDER, LYOPHILIZED, FOR SOLUTION INTRAMUSCULAR; INTRAVENOUS PRN
OUTPATIENT
Start: 2024-09-02

## 2024-03-15 RX ORDER — DIPHENHYDRAMINE HYDROCHLORIDE 50 MG/ML
50 INJECTION INTRAMUSCULAR; INTRAVENOUS PRN
OUTPATIENT
Start: 2024-09-02

## 2024-03-15 RX ADMIN — DENOSUMAB 60 MG: 60 INJECTION SUBCUTANEOUS at 10:49

## 2024-03-15 ASSESSMENT — FIBROSIS 4 INDEX: FIB4 SCORE: 1.27

## 2024-03-15 NOTE — PROGRESS NOTES
Pt presented to [facility] for Prolia injection. POC discussed and pt verbalized understanding. Pt confirms taking VitD/calcium supplements, denies recent or planned dental/oral procedures in the last month or the next month, and denies s/s of hypocalcemia or infection today. Recent labs reviewed by pharmacy and Prolia injection administered per MAR. Band-aid applied to site and pt tolerated well. No s/s of adverse reactions or complications noted.  emailed and pt confirms MyChart access. Pt discharged to self care in NAD.

## 2024-03-22 ENCOUNTER — OFFICE VISIT (OUTPATIENT)
Dept: CARDIOLOGY | Facility: MEDICAL CENTER | Age: 74
End: 2024-03-22
Attending: INTERNAL MEDICINE
Payer: MEDICARE

## 2024-03-22 VITALS
SYSTOLIC BLOOD PRESSURE: 100 MMHG | OXYGEN SATURATION: 98 % | RESPIRATION RATE: 16 BRPM | DIASTOLIC BLOOD PRESSURE: 62 MMHG | HEART RATE: 59 BPM | HEIGHT: 66 IN | WEIGHT: 136 LBS | BODY MASS INDEX: 21.86 KG/M2

## 2024-03-22 DIAGNOSIS — I49.1 PAC (PREMATURE ATRIAL CONTRACTION): ICD-10-CM

## 2024-03-22 LAB — EKG IMPRESSION: NORMAL

## 2024-03-22 PROCEDURE — 3074F SYST BP LT 130 MM HG: CPT | Performed by: INTERNAL MEDICINE

## 2024-03-22 PROCEDURE — 99214 OFFICE O/P EST MOD 30 MIN: CPT | Performed by: INTERNAL MEDICINE

## 2024-03-22 PROCEDURE — 3078F DIAST BP <80 MM HG: CPT | Performed by: INTERNAL MEDICINE

## 2024-03-22 PROCEDURE — 99213 OFFICE O/P EST LOW 20 MIN: CPT | Performed by: INTERNAL MEDICINE

## 2024-03-22 PROCEDURE — 93005 ELECTROCARDIOGRAM TRACING: CPT | Performed by: INTERNAL MEDICINE

## 2024-03-22 RX ORDER — ATENOLOL 25 MG/1
25 TABLET ORAL EVERY EVENING
Qty: 90 TABLET | Refills: 3 | Status: SHIPPED | OUTPATIENT
Start: 2024-03-22

## 2024-03-22 ASSESSMENT — FIBROSIS 4 INDEX: FIB4 SCORE: 1.27

## 2024-03-22 NOTE — PROGRESS NOTES
Arrhythmia Clinic Note (Established patient)    DOS: 3/22/2024    Chief complaint/Reason for consult: Palpitations    Interval History: 73-year-old female with a history of hypertension, palpitations, and heart murmur.  Here for annual follow-up.  She has been doing well, has no acute complaints.    ROS (+ highlighted in bold):  Constitutional: Fevers/chills/fatigue/weightloss  HEENT: Blurry vision/eye pain/sore throat/hearing loss  Respiratory: Shortness of breath/cough  Cardiovascular: Chest pain/palpitations/edema/orthopnea/syncope  GI: Nausea/vomitting/diarrhea  MSK: Arthralgias/myagias/muscle weakness  Skin: Rash/sores  Neurological: Numbness/tremors/vertigo  Endocrine: Excessive thirst/polyuria/cold intolerance/heat intolerance  Psych: Depression/anxiety    Past Medical History:   Diagnosis Date    Allergy     Arrhythmia     Arthritis     Backpain     had back surgery, s1-l5 fusion    Blood transfusion without reported diagnosis     Bowel obstruction (HCC)     Cancer (HCC)     overian cancer    Heart murmur     Hypertension     Indigestion     sometimes    Kidney disease     Osteoporosis     Other specified symptom associated with female genital organs     had ca ov the ovary    Ovarian cancer (HCC)     Schwannoma of cranial nerve (HCC)        Past Surgical History:   Procedure Laterality Date    PB EXC LINK/VAS MAL SFT TIS HAND/FNGR SUBFASC* Right 08/04/2023    Procedure: RIGHT SMALL FINGER MASS EXCISION;  Surgeon: Yudelka Juan M.D.;  Location: [facility];  Service: Orthopedics    HYSTERECTOMY RADICAL  2012    OOPHORECTOMY Bilateral 2012    FUSION, SPINE, LUMBAR, PLIF  2001    CRANIOTOMY  1993    Schwannoma L 5th CN    EYE SURGERY         Social History     Socioeconomic History    Marital status:      Spouse name: Not on file    Number of children: Not on file    Years of education: Not on file    Highest education level: Master's degree (e.g., MA, MS, Vikram, MEd, MSW,  ANA)   Occupational History    Not on file   Tobacco Use    Smoking status: Never    Smokeless tobacco: Never   Vaping Use    Vaping Use: Never used   Substance and Sexual Activity    Alcohol use: Yes     Alcohol/week: 1.2 oz     Types: 1 Glasses of wine, 1 Cans of beer per week     Comment: 2 drinks per week on average     Drug use: No    Sexual activity: Yes     Partners: Male     Birth control/protection: Male Sterilization, Post-Menopausal   Other Topics Concern     Service Not Asked    Blood Transfusions Not Asked    Caffeine Concern Not Asked    Occupational Exposure Not Asked    Hobby Hazards Not Asked    Sleep Concern No    Stress Concern Not Asked    Weight Concern No    Special Diet Not Asked    Back Care Not Asked    Exercise Yes    Bike Helmet Not Asked    Seat Belt Not Asked    Self-Exams Not Asked   Social History Narrative    Not on file     Social Determinants of Health     Financial Resource Strain: Low Risk  (10/21/2022)    Overall Financial Resource Strain (CARDIA)     Difficulty of Paying Living Expenses: Not hard at all   Food Insecurity: No Food Insecurity (11/19/2023)    Hunger Vital Sign     Worried About Running Out of Food in the Last Year: Never true     Ran Out of Food in the Last Year: Never true   Transportation Needs: No Transportation Needs (11/19/2023)    PRAPARE - Transportation     Lack of Transportation (Medical): No     Lack of Transportation (Non-Medical): No   Physical Activity: Sufficiently Active (11/19/2023)    Exercise Vital Sign     Days of Exercise per Week: 7 days     Minutes of Exercise per Session: 60 min   Stress: No Stress Concern Present (11/19/2023)    [employer] [employer] - Occupational Stress Questionnaire     Feeling of Stress : Only a little   Social Connections: Moderately Integrated (11/19/2023)    Social Connection and Isolation Panel [NHANES]     Frequency of Communication with Friends and Family: More than three times a week      Frequency of Social Gatherings with Friends and Family: More than three times a week     Attends [employer] Services: Never     Active Member of Clubs or Organizations: Yes     Attends Club or Organization Meetings: More than 4 times per year     Marital Status:    Intimate Partner Violence: Not on file   Housing Stability: Low Risk  (11/19/2023)    Housing Stability Vital Sign     Unable to Pay for Housing in the Last Year: No     Number of Places Lived in the Last Year: 1     Unstable Housing in the Last Year: No       Family History   Problem Relation Age of Onset    Heart Disease Mother         A fib    Stroke Father     Cancer Father         Melanoma    Cancer Maternal Grandmother         Uterine    Cancer Maternal Grandfather         Lung    Cancer Sister         Melanoma    Cancer Brother         Melanoma    Heart Disease Brother         A fib    Cancer Brother         Melanoma    Ovarian Cancer Neg Hx     Tubal Cancer Neg Hx     Peritoneal Cancer Neg Hx     Colorectal Cancer Neg Hx     Breast Cancer Neg Hx        Allergies   Allergen Reactions    Latex Rash     Rash      Losartan      palpitations    Tetracycline      Sensitivity to sun; sun burns.  Reaction date; 15 years ago from 2013.       Current Outpatient Medications   Medication Sig Dispense Refill    atenolol (TENORMIN) 25 MG Tab Take 1 Tablet by mouth every evening. 90 Tablet 3    escitalopram (LEXAPRO) 10 MG Tab TAKE 1 TABLET BY MOUTH EVERY MORNING 90 Tablet 3    CREON 67946-65562 units Cap DR Particles TAKE 3 CAPSULES BY MOUTH THREE TIMES DAILY WITH MEALS      LORazepam (ATIVAN) 1 MG Tab Take 0.5 mg by mouth. ONCE IN AWHILE. MAYBE A COUPLE TIMES OF YEAR      denosumab (PROLIA) 60 MG/ML Solution Prefilled Syringe injection Inject 60 mg under the skin. Twice a year      estrogens, conjugated (PREMARIN) 0.625 MG/GM Cream Insert 0.5 g into the vagina every day.      fluticasone (FLONASE) 50 MCG/ACT nasal spray Spray 1 Spray in nose every day.    "   Ayr Saline Nasal Gel Apply  to affected nostril(s). ONCE IN AWHILE IF NEEDED. BEEN ABOUT A YEAR      ondansetron (ZOFRAN) 8 MG Tab Take 8 mg by mouth.      calcium citrate (CALCITRATE) 950 MG Tab Take 950 mg by mouth every day.      polyethyl glycol-propyl glycol (SYSTANE) 0.4-0.3 % Solution Place 1 Drop in both eyes every morning.      vitamin D (CHOLECALCIFEROL) 1000 UNIT TABS Take 2,000 Units by mouth every day.      Multiple Vitamins-Minerals (SENIOR MULTIVITAMIN PLUS PO) Take 1 Tab by mouth every morning.      docosahexanoic acid (OMEGA 3 FA) 1000 MG CAPS Take 1,000 mg by mouth every morning.       No current facility-administered medications for this visit.       Physical Exam:  Vitals:    03/22/24 1236   BP: 100/62   BP Location: Left arm   Patient Position: Sitting   BP Cuff Size: Adult   Pulse: (!) 59   Resp: 16   SpO2: 98%   Weight: 61.7 kg (136 lb)   Height: 1.68 m (5' 6.14\")     General appearance: NAD, conversant   Eyes: anicteric sclerae, moist conjunctivae; no lid-lag; PERRLA  HENT: Atraumatic; oropharynx clear with moist mucous membranes and no mucosal ulcerations; normal hard and soft palate  Neck: Trachea midline; FROM, supple, no thyromegaly or lymphadenopathy  Lungs: CTA, with normal respiratory effort and no intercostal retractions  CV: RRR, no MRGs, no JVD  Abdomen: Soft, non-tender; no masses or HSM  Extremities: No peripheral edema or extremity lymphadenopathy  Skin: Normal temperature, turgor and texture; no rash, ulcers or subcutaneous nodules  Psych: Appropriate affect, alert and oriented to person, place and time    Data:  Lipids:   Lab Results   Component Value Date/Time    CHOLSTRLTOT 233 (H) 02/15/2023 10:01 AM    TRIGLYCERIDE 95 02/15/2023 10:01 AM    HDL 75 02/15/2023 10:01 AM     (H) 02/15/2023 10:01 AM        BMP:  Lab Results   Component Value Date/Time    SODIUM 137 02/27/2024 0920    POTASSIUM 5.0 02/27/2024 0920    CHLORIDE 102 02/27/2024 0920    CO2 23 02/27/2024 " "0920    GLUCOSE 92 02/27/2024 0920    BUN 28 (H) 02/27/2024 0920    CREATININE 1.15 02/27/2024 0920    CALCIUM 9.3 02/27/2024 0920    ANION 12.0 02/27/2024 0920        TSH:   Lab Results   Component Value Date/Time    TSHULTRASEN 1.430 03/11/2022 0736        THYROXINE (T4):   No results found for: \"FREEDIR\"     CBC:   Lab Results   Component Value Date/Time    WBC 4.4 (L) 02/27/2024 09:20 AM    RBC 4.98 02/27/2024 09:20 AM    HEMOGLOBIN 15.1 02/27/2024 09:20 AM    HEMATOCRIT 44.8 02/27/2024 09:20 AM    MCV 90.0 02/27/2024 09:20 AM    MCH 30.3 02/27/2024 09:20 AM    MCHC 33.7 02/27/2024 09:20 AM    RDW 45.6 02/27/2024 09:20 AM    PLATELETCT 300 02/27/2024 09:20 AM    MPV 10.2 02/27/2024 09:20 AM    NEUTSPOLYS 76.20 (H) 02/26/2019 10:05 PM    LYMPHOCYTES 17.50 (L) 02/26/2019 10:05 PM    MONOCYTES 4.40 02/26/2019 10:05 PM    EOSINOPHILS 0.90 02/26/2019 10:05 PM    BASOPHILS 0.70 02/26/2019 10:05 PM    IMMGRAN 0.30 02/26/2019 10:05 PM    NRBC 0.00 02/26/2019 10:05 PM    NEUTS 11.58 (H) 02/26/2019 10:05 PM    LYMPHS 2.65 02/26/2019 10:05 PM    MONOS 0.66 02/26/2019 10:05 PM    EOS 0.13 02/26/2019 10:05 PM    BASO 0.10 02/26/2019 10:05 PM    IMMGRANAB 0.04 02/26/2019 10:05 PM    NRBCAB 0.00 02/26/2019 10:05 PM        CBC w/o DIFF  Lab Results   Component Value Date/Time    WBC 4.4 (L) 02/27/2024 09:20 AM    RBC 4.98 02/27/2024 09:20 AM    HEMOGLOBIN 15.1 02/27/2024 09:20 AM    MCV 90.0 02/27/2024 09:20 AM    MCH 30.3 02/27/2024 09:20 AM    MCHC 33.7 02/27/2024 09:20 AM    RDW 45.6 02/27/2024 09:20 AM    MPV 10.2 02/27/2024 09:20 AM       Prior echo/stress reviewed: Normal echocardiogram    CT calcium scoring: Calcium score is 0    EKG interpreted by me: Sinus rhythm    Impression/Plan:  1. PAC (premature atrial contraction)  EKG        1.  Hypertension  2.  Palpitations  3.  Hyperlipidemia    -Blood pressure is at goal continue atenolol   -Palpitations are minimal  -Despite elevated LDL, reassuring calcium scoring, she " will continue deferring statin at this time    Follow-up in 1 year    A total of 31 minutes of time was spent on day of encounter reviewing medical record, performing history and examination, counseling, ordering medication/test/consults, collaborating with referring service, and documentation.      Cristofer Paul MD  Cardiac Electrophysiology     Deep Sutures: 4-0 Vicryl

## 2024-04-01 LAB — EKG IMPRESSION: NORMAL

## 2024-04-05 ENCOUNTER — APPOINTMENT (OUTPATIENT)
Dept: MEDICAL GROUP | Facility: PHYSICIAN GROUP | Age: 74
End: 2024-04-05
Payer: MEDICARE

## 2024-05-02 ENCOUNTER — APPOINTMENT (OUTPATIENT)
Dept: MEDICAL GROUP | Facility: PHYSICIAN GROUP | Age: 74
End: 2024-05-02
Payer: MEDICARE

## 2024-05-13 ENCOUNTER — OFFICE VISIT (OUTPATIENT)
Dept: MEDICAL GROUP | Facility: PHYSICIAN GROUP | Age: 74
End: 2024-05-13
Payer: MEDICARE

## 2024-05-13 VITALS
TEMPERATURE: 97.2 F | HEIGHT: 66 IN | HEART RATE: 62 BPM | WEIGHT: 137.4 LBS | OXYGEN SATURATION: 98 % | BODY MASS INDEX: 22.08 KG/M2 | DIASTOLIC BLOOD PRESSURE: 62 MMHG | SYSTOLIC BLOOD PRESSURE: 106 MMHG

## 2024-05-13 DIAGNOSIS — N39.46 MIXED STRESS AND URGE URINARY INCONTINENCE: ICD-10-CM

## 2024-05-13 DIAGNOSIS — Z91.09 ENVIRONMENTAL ALLERGIES: Primary | ICD-10-CM

## 2024-05-13 DIAGNOSIS — N18.31 STAGE 3A CHRONIC KIDNEY DISEASE: ICD-10-CM

## 2024-05-13 DIAGNOSIS — M81.0 AGE-RELATED OSTEOPOROSIS WITHOUT CURRENT PATHOLOGICAL FRACTURE: ICD-10-CM

## 2024-05-13 DIAGNOSIS — H61.23 BILATERAL IMPACTED CERUMEN: ICD-10-CM

## 2024-05-13 DIAGNOSIS — Z85.43 HISTORY OF OVARIAN CANCER: ICD-10-CM

## 2024-05-13 DIAGNOSIS — E21.3 HYPERPARATHYROIDISM (HCC): ICD-10-CM

## 2024-05-13 PROCEDURE — 69210 REMOVE IMPACTED EAR WAX UNI: CPT | Performed by: FAMILY MEDICINE

## 2024-05-13 PROCEDURE — 99214 OFFICE O/P EST MOD 30 MIN: CPT | Mod: 25 | Performed by: FAMILY MEDICINE

## 2024-05-13 PROCEDURE — 3078F DIAST BP <80 MM HG: CPT | Performed by: FAMILY MEDICINE

## 2024-05-13 PROCEDURE — 3074F SYST BP LT 130 MM HG: CPT | Performed by: FAMILY MEDICINE

## 2024-05-13 RX ORDER — AZITHROMYCIN 500 MG/1
TABLET, FILM COATED ORAL
COMMUNITY
Start: 2024-02-21

## 2024-05-13 ASSESSMENT — FIBROSIS 4 INDEX: FIB4 SCORE: 1.29

## 2024-05-13 ASSESSMENT — PATIENT HEALTH QUESTIONNAIRE - PHQ9: CLINICAL INTERPRETATION OF PHQ2 SCORE: 0

## 2024-05-13 NOTE — PROGRESS NOTES
Verbal consent was acquired by the patient to use [employer] ambient listening note generation during this visit     Subjective:     HPI:   History of Present Illness  The patient presents for evaluation of multiple medical concerns.    Several months prior, the patient's oncologist observed wax accumulation in one ear, prompting a recommendation to consult with me. She does not recall which ear was affected.    The patient has been using Flonase, which initially provided relief for an extended period. However, its efficacy varies, particularly on the left side, where a cavity is located, leading her to question if this is the cause of her allergies. She has resorted to Zyrtec, which also proves ineffective. She does not perform sinus rinses. She has utilized 2 sprays per nostril a few times when desperate, which proved beneficial. Her allergy symptoms fluctuate, with periods of relief for several days followed by a specific trigger, although she is uncertain of the specifics. She recalls a recent trip to [facility] where she grew up, where she did not require any medication for her allergies.    The patient was previously under the care of Dr. Hsieh for her kidneys, who deemed her condition stable and recommended a follow-up with us.    The patient consulted with Dr. Evans at [facility], who advised her to continue with Prolia due to her inability to tolerate Fosamax due to its ineffectiveness. She initiated Prolia treatment in 2015 following her chemotherapy.    The patient continues to experience incontinence issues. She has been undergoing physical therapy for a significant period, which appears to provide relief. Dr. Martínez recommended bulking surgery, but she has not yet pursued due to ongoing physical therapy.    The patient continues to experience diarrhea. She has scheduled an appointment with the gastroenterology department in 2 weeks. Dr. Pederson at [facility] manages her diarrhea, whom she sees annually. She  "continues to take Creon consistently, but continues to experience diarrhea, predominantly in the morning after breakfast. She suspects that coffee may be triggering her diarrhea, but she also reports a hypersensitivity.    Health Maintenance: Completed    Objective:     Exam:  /62 (BP Location: Left arm, Patient Position: Sitting, BP Cuff Size: Adult)   Pulse 62   Temp 36.2 °C (97.2 °F) (Temporal)   Ht 1.676 m (5' 6\")   Wt 62.3 kg (137 lb 6.4 oz)   SpO2 98%   BMI 22.18 kg/m²  Body mass index is 22.18 kg/m².    Physical Exam  Constitutional:       Appearance: Normal appearance.   HENT:      Right Ear: There is impacted cerumen.      Left Ear: There is impacted cerumen.   Cardiovascular:      Rate and Rhythm: Normal rate and regular rhythm.      Heart sounds: Normal heart sounds.   Pulmonary:      Effort: Pulmonary effort is normal.      Breath sounds: Normal breath sounds.   Musculoskeletal:      Cervical back: Normal range of motion and neck supple.   Lymphadenopathy:      Cervical: No cervical adenopathy.   Neurological:      Mental Status: She is alert.             Results  Laboratory Studies  GFR was 51. Microalbumin to creatinine ratio was normal. Urinalysis was normal. Parathyroid hormone was elevated. Blood cell count did not show any anemia. Vitamin D was 48.    Assessment & Plan:     1. Environmental allergies        2. Stage 3a chronic kidney disease        3. Hyperparathyroidism (HCC)        4. Age-related osteoporosis without current pathological fracture        5. Mixed stress and urge urinary incontinence        6. History of ovarian cancer  CANCER ANTIGEN 125          Assessment & Plan  1. Environmental allergies.  The patient's condition is chronic and currently uncontrolled. Previously, the patient was responding well to Flonase alone, however, its efficacy has recently diminished. The patient will commence sinus rinses twice daily, followed by Flonase, one spray per nostril twice daily, " and over-the-counter Zyrtec once daily. A reevaluation will be conducted at her next appointment.    2. Stage 3a chronic kidney disease.  This condition is chronic and stable. The patient no longer requires nephrology follow-up. The patient's labs will be monitored biannually.    3. Hyperparathyroidism.  This is a new diagnosis. Recent labs revealed a new elevation in her parathyroid hormone level, while her vitamin D was normal at 48. A repeat test will be conducted when we recheck her kidney labs in 6 months. If it continues to be persistently elevated, a work-up will be conducted.    4. Osteoporosis.  This condition is chronic and stable. Previously, an endocrinologist at [facility] was managed by the patient, but the patient has plans to transfer care here. The patient will continue with Prolia 60 mg every 6 months and her bone density scan will be monitored every 2 years.    5. Urinary incontinence.  This condition is a chronic and stable condition. Despite seeing pelvic floor therapy, the patient continues to struggle with incontinence. The patient is considering a urethral bulking procedure with urogynecology and will follow up with them as directed.    6. History of ovarian cancer.  This condition is chronic and stable.    7. Bilateral cerumen impaction.  This is chronic and noted on exam today. Ear lavage was performed. See procedure note for details.    Referral for genetic research was offered. Patient is a participant.    Return in about 4 weeks (around 6/10/2024) for f/u allergies.    Please note that this dictation was created using voice recognition software. I have made every reasonable attempt to correct obvious errors, but I expect that there are errors of grammar and possibly content that I did not discover before finalizing the note.

## 2024-05-13 NOTE — PROCEDURES
Procedure: Cerumen Removal  Risks and benefits of procedure discussed with patient.  Cerumen removed with lavage by the MA. Patient tolerated the procedure well    Post-lavage curette was performed by Dr. Grajeda. Post procedure exam with clear canal and normal TM.    Pt educated about proper care of ear canal. Q-tip cleaning discouraged, use of debrox and warm water lavage discussed.

## 2024-05-22 ENCOUNTER — HOSPITAL ENCOUNTER (OUTPATIENT)
Dept: LAB | Facility: MEDICAL CENTER | Age: 74
End: 2024-05-22
Attending: FAMILY MEDICINE
Payer: MEDICARE

## 2024-05-22 DIAGNOSIS — Z85.43 HISTORY OF OVARIAN CANCER: ICD-10-CM

## 2024-05-22 LAB — CANCER AG125 SERPL-ACNC: 13 U/ML (ref 0–35)

## 2024-06-10 ENCOUNTER — OFFICE VISIT (OUTPATIENT)
Dept: MEDICAL GROUP | Facility: PHYSICIAN GROUP | Age: 74
End: 2024-06-10
Payer: MEDICARE

## 2024-06-10 VITALS
TEMPERATURE: 97.1 F | DIASTOLIC BLOOD PRESSURE: 64 MMHG | HEART RATE: 60 BPM | HEIGHT: 66 IN | WEIGHT: 137.2 LBS | BODY MASS INDEX: 22.05 KG/M2 | SYSTOLIC BLOOD PRESSURE: 108 MMHG | OXYGEN SATURATION: 98 %

## 2024-06-10 DIAGNOSIS — Z71.84 TRAVEL ADVICE ENCOUNTER: ICD-10-CM

## 2024-06-10 DIAGNOSIS — Z91.09 ENVIRONMENTAL ALLERGIES: Primary | ICD-10-CM

## 2024-06-10 PROCEDURE — 99213 OFFICE O/P EST LOW 20 MIN: CPT | Performed by: FAMILY MEDICINE

## 2024-06-10 ASSESSMENT — FIBROSIS 4 INDEX: FIB4 SCORE: 1.29

## 2024-06-10 NOTE — PROGRESS NOTES
"Verbal consent was acquired by the patient to use [employer] ambient listening note generation during this visit     Subjective:     HPI:   History of Present Illness  The patient is a 74-year-old woman here today to follow up on allergies. At her last appointment, I recommended that she do sinus rinses twice daily followed by Flonase 1 spray per nostril twice daily and over-the-counter Zyrtec once daily.    The patient reports an improvement in her allergies since her last visit. She has been adhering to a regimen of Flonase, administered as 2 sprays per nostril once daily, and Zyrtec as needed, both administered at night, which typically provides relief for 24 hours. She has utilized the sinus rinse on several occasions when she observed that the others were ineffective. However, she found the rinse to be effective. She recalls a time when she observed that the rinse was ineffective, particularly during a camping trip due to dust exposure. Despite attempts to clean the dust from the back of her truck during sleep, she found relief the following morning.    The patient is planning a road  trip to [facility] on 08/13/2024 and returns on 08/27/2024.     She denies experiencing chest pain, difficulty breathing, fevers, or chills in the past few days.    Supplemental Information  She is on Prolia.    [facility]  8/13 - 8/27/24  Vaccines  Tdap - UTD  Shingles - UTD  Polio - UTD  COVID - UTD  Hep B - UTD  Hep A - UTD  Typhoid - UTD  TBE - maybe  Rabies - maybe      Health Maintenance: Completed    Objective:     Exam:  /64 (BP Location: Left arm, Patient Position: Sitting, BP Cuff Size: Adult)   Pulse 60   Temp 36.2 °C (97.1 °F) (Temporal)   Ht 1.676 m (5' 6\")   Wt 62.2 kg (137 lb 3.2 oz)   SpO2 98%   BMI 22.14 kg/m²  Body mass index is 22.14 kg/m².    Physical Exam  Constitutional:       Appearance: Normal appearance.   Cardiovascular:      Rate and Rhythm: Normal rate and regular rhythm.      Heart " sounds: Normal heart sounds.   Pulmonary:      Effort: Pulmonary effort is normal.      Breath sounds: Normal breath sounds.   Musculoskeletal:      Cervical back: Normal range of motion and neck supple.   Lymphadenopathy:      Cervical: No cervical adenopathy.   Neurological:      Mental Status: She is alert.             Results      Assessment & Plan:     1. Environmental allergies        2. Travel advice encounter            Assessment & Plan  1. Environmental allergies.  The patient's environmental allergies, a chronic condition, have been well-managed since our last consultation. The patient has increased her Flonase dosage to 2 sprays per nostril daily, which has resulted in significant improvement. Occasionally, she supplements with Zyrtec, which provides relief, and on rare occasions when her allergies are severe, she finds sinus rinses to be beneficial. Therefore, she will maintain her current regimen.    2. Travel advice encounter.  The patient is scheduled to travel to [facility] in 08/2024. All her routine vaccines, which are current, were reviewed. However, due to her outdoor activities, she may want to consider the rabies vaccine and the tickborne encephalitis vaccine. She will consult with a travel clinic to investigate this matter.    Referral for genetic research was offered. Patient is a participant.    Return in about 5 months (around 11/10/2024) for Medicare Wellness.    Please note that this dictation was created using voice recognition software. I have made every reasonable attempt to correct obvious errors, but I expect that there are errors of grammar and possibly content that I did not discover before finalizing the note.

## 2024-07-17 ENCOUNTER — OFFICE VISIT (OUTPATIENT)
Dept: MEDICAL GROUP | Facility: PHYSICIAN GROUP | Age: 74
End: 2024-07-17

## 2024-07-17 VITALS
OXYGEN SATURATION: 98 % | SYSTOLIC BLOOD PRESSURE: 104 MMHG | BODY MASS INDEX: 22.5 KG/M2 | HEART RATE: 60 BPM | WEIGHT: 140 LBS | DIASTOLIC BLOOD PRESSURE: 62 MMHG | HEIGHT: 66 IN | TEMPERATURE: 97.3 F

## 2024-07-17 DIAGNOSIS — Z85.43 HISTORY OF OVARIAN CANCER: Primary | ICD-10-CM

## 2024-07-17 DIAGNOSIS — Z02.89 ENCOUNTER FOR COMPLETION OF FORM WITH PATIENT: ICD-10-CM

## 2024-07-17 PROCEDURE — 7101 PR PHYSICAL: Performed by: FAMILY MEDICINE

## 2024-07-17 ASSESSMENT — FIBROSIS 4 INDEX: FIB4 SCORE: 1.29

## 2024-07-18 ENCOUNTER — APPOINTMENT (OUTPATIENT)
Dept: MEDICAL GROUP | Facility: PHYSICIAN GROUP | Age: 74
End: 2024-07-18
Payer: MEDICARE

## 2024-08-14 DIAGNOSIS — E21.3 HYPERPARATHYROIDISM (HCC): ICD-10-CM

## 2024-08-14 DIAGNOSIS — N18.31 STAGE 3A CHRONIC KIDNEY DISEASE: ICD-10-CM

## 2024-09-17 ENCOUNTER — OUTPATIENT INFUSION SERVICES (OUTPATIENT)
Dept: ONCOLOGY | Facility: MEDICAL CENTER | Age: 74
End: 2024-09-17
Attending: FAMILY MEDICINE
Payer: MEDICARE

## 2024-09-17 VITALS
DIASTOLIC BLOOD PRESSURE: 68 MMHG | SYSTOLIC BLOOD PRESSURE: 101 MMHG | HEIGHT: 67 IN | RESPIRATION RATE: 18 BRPM | OXYGEN SATURATION: 97 % | HEART RATE: 65 BPM | BODY MASS INDEX: 22.04 KG/M2 | WEIGHT: 140.43 LBS | TEMPERATURE: 96.7 F

## 2024-09-17 DIAGNOSIS — M81.0 AGE-RELATED OSTEOPOROSIS WITHOUT CURRENT PATHOLOGICAL FRACTURE: ICD-10-CM

## 2024-09-17 LAB
CA-I BLD ISE-SCNC: 1.19 MMOL/L (ref 1.1–1.3)
CREAT BLD-MCNC: 1.4 MG/DL (ref 0.5–1.4)

## 2024-09-17 PROCEDURE — 82330 ASSAY OF CALCIUM: CPT

## 2024-09-17 PROCEDURE — 82565 ASSAY OF CREATININE: CPT

## 2024-09-17 PROCEDURE — 36415 COLL VENOUS BLD VENIPUNCTURE: CPT

## 2024-09-17 PROCEDURE — 96372 THER/PROPH/DIAG INJ SC/IM: CPT

## 2024-09-17 PROCEDURE — 700111 HCHG RX REV CODE 636 W/ 250 OVERRIDE (IP): Mod: JZ,JG | Performed by: FAMILY MEDICINE

## 2024-09-17 RX ORDER — EPINEPHRINE 1 MG/ML(1)
0.5 AMPUL (ML) INJECTION PRN
OUTPATIENT
Start: 2025-03-10

## 2024-09-17 RX ORDER — METHYLPREDNISOLONE SODIUM SUCCINATE 125 MG/2ML
125 INJECTION, POWDER, LYOPHILIZED, FOR SOLUTION INTRAMUSCULAR; INTRAVENOUS PRN
OUTPATIENT
Start: 2025-03-10

## 2024-09-17 RX ORDER — DIPHENHYDRAMINE HYDROCHLORIDE 50 MG/ML
50 INJECTION INTRAMUSCULAR; INTRAVENOUS PRN
OUTPATIENT
Start: 2025-03-10

## 2024-09-17 RX ADMIN — DENOSUMAB 60 MG: 60 INJECTION SUBCUTANEOUS at 11:00

## 2024-09-17 ASSESSMENT — FIBROSIS 4 INDEX: FIB4 SCORE: 1.29

## 2024-09-17 NOTE — PROGRESS NOTES
Pt. Arrived to [facility] for Prolia injection. Orders and vital signs reviewed. Pt. Confirms taking Vit D/Raj supplements, denies recent or planned dental procedures within the last month. Labs collected from RAC gauzed and tape applied. Lab results within parameters for Prolia injection. Prolia injection given per MAR.  emailed to set up next appointment. Pt. Left in stable condition.

## 2024-10-16 RX ORDER — ESCITALOPRAM OXALATE 10 MG/1
10 TABLET ORAL EVERY MORNING
Qty: 90 TABLET | Refills: 3 | Status: SHIPPED | OUTPATIENT
Start: 2024-10-16

## 2024-10-17 ENCOUNTER — HOSPITAL ENCOUNTER (OUTPATIENT)
Dept: LAB | Facility: MEDICAL CENTER | Age: 74
End: 2024-10-17
Attending: FAMILY MEDICINE
Payer: MEDICARE

## 2024-10-17 DIAGNOSIS — N18.31 STAGE 3A CHRONIC KIDNEY DISEASE: ICD-10-CM

## 2024-10-17 DIAGNOSIS — E21.3 HYPERPARATHYROIDISM (HCC): ICD-10-CM

## 2024-10-17 LAB
ALBUMIN SERPL BCP-MCNC: 4.3 G/DL (ref 3.2–4.9)
BUN SERPL-MCNC: 22 MG/DL (ref 8–22)
CALCIUM ALBUM COR SERPL-MCNC: 9.8 MG/DL (ref 8.5–10.5)
CALCIUM SERPL-MCNC: 10 MG/DL (ref 8.5–10.5)
CHLORIDE SERPL-SCNC: 104 MMOL/L (ref 96–112)
CO2 SERPL-SCNC: 25 MMOL/L (ref 20–33)
CREAT SERPL-MCNC: 1.26 MG/DL (ref 0.5–1.4)
GFR SERPLBLD CREATININE-BSD FMLA CKD-EPI: 45 ML/MIN/1.73 M 2
GLUCOSE SERPL-MCNC: 94 MG/DL (ref 65–99)
PHOSPHATE SERPL-MCNC: 3.6 MG/DL (ref 2.5–4.5)
POTASSIUM SERPL-SCNC: 5.3 MMOL/L (ref 3.6–5.5)
SODIUM SERPL-SCNC: 140 MMOL/L (ref 135–145)

## 2024-10-17 PROCEDURE — 82306 VITAMIN D 25 HYDROXY: CPT

## 2024-10-17 PROCEDURE — 83970 ASSAY OF PARATHORMONE: CPT

## 2024-10-17 PROCEDURE — 36415 COLL VENOUS BLD VENIPUNCTURE: CPT

## 2024-10-17 PROCEDURE — 80069 RENAL FUNCTION PANEL: CPT

## 2024-10-18 LAB
25(OH)D3 SERPL-MCNC: 49 NG/ML (ref 30–100)
PTH-INTACT SERPL-MCNC: 57.3 PG/ML (ref 14–72)

## 2024-11-19 SDOH — ECONOMIC STABILITY: INCOME INSECURITY: IN THE LAST 12 MONTHS, WAS THERE A TIME WHEN YOU WERE NOT ABLE TO PAY THE MORTGAGE OR RENT ON TIME?: NO

## 2024-11-19 SDOH — ECONOMIC STABILITY: FOOD INSECURITY: WITHIN THE PAST 12 MONTHS, YOU WORRIED THAT YOUR FOOD WOULD RUN OUT BEFORE YOU GOT MONEY TO BUY MORE.: NEVER TRUE

## 2024-11-19 SDOH — HEALTH STABILITY: PHYSICAL HEALTH: ON AVERAGE, HOW MANY DAYS PER WEEK DO YOU ENGAGE IN MODERATE TO STRENUOUS EXERCISE (LIKE A BRISK WALK)?: 7 DAYS

## 2024-11-19 SDOH — ECONOMIC STABILITY: FOOD INSECURITY: WITHIN THE PAST 12 MONTHS, THE FOOD YOU BOUGHT JUST DIDN'T LAST AND YOU DIDN'T HAVE MONEY TO GET MORE.: NEVER TRUE

## 2024-11-19 SDOH — ECONOMIC STABILITY: INCOME INSECURITY: HOW HARD IS IT FOR YOU TO PAY FOR THE VERY BASICS LIKE FOOD, HOUSING, MEDICAL CARE, AND HEATING?: NOT HARD AT ALL

## 2024-11-19 SDOH — HEALTH STABILITY: PHYSICAL HEALTH: ON AVERAGE, HOW MANY MINUTES DO YOU ENGAGE IN EXERCISE AT THIS LEVEL?: 60 MIN

## 2024-11-19 ASSESSMENT — LIFESTYLE VARIABLES
HOW MANY STANDARD DRINKS CONTAINING ALCOHOL DO YOU HAVE ON A TYPICAL DAY: 1 OR 2
SKIP TO QUESTIONS 9-10: 1
HOW OFTEN DO YOU HAVE SIX OR MORE DRINKS ON ONE OCCASION: NEVER
HOW OFTEN DO YOU HAVE A DRINK CONTAINING ALCOHOL: 2-4 TIMES A MONTH
AUDIT-C TOTAL SCORE: 2

## 2024-11-19 ASSESSMENT — SOCIAL DETERMINANTS OF HEALTH (SDOH)
HOW OFTEN DO YOU ATTEND CHURCH OR RELIGIOUS SERVICES?: MORE THAN 4 TIMES PER YEAR
WITHIN THE PAST 12 MONTHS, YOU WORRIED THAT YOUR FOOD WOULD RUN OUT BEFORE YOU GOT THE MONEY TO BUY MORE: NEVER TRUE
HOW OFTEN DO YOU ATTENT MEETINGS OF THE CLUB OR ORGANIZATION YOU BELONG TO?: MORE THAN 4 TIMES PER YEAR
DO YOU BELONG TO ANY CLUBS OR ORGANIZATIONS SUCH AS CHURCH GROUPS UNIONS, FRATERNAL OR ATHLETIC GROUPS, OR SCHOOL GROUPS?: YES
HOW OFTEN DO YOU ATTENT MEETINGS OF THE CLUB OR ORGANIZATION YOU BELONG TO?: MORE THAN 4 TIMES PER YEAR
HOW OFTEN DO YOU GET TOGETHER WITH FRIENDS OR RELATIVES?: ONCE A WEEK
HOW OFTEN DO YOU GET TOGETHER WITH FRIENDS OR RELATIVES?: ONCE A WEEK
HOW MANY DRINKS CONTAINING ALCOHOL DO YOU HAVE ON A TYPICAL DAY WHEN YOU ARE DRINKING: 1 OR 2
HOW HARD IS IT FOR YOU TO PAY FOR THE VERY BASICS LIKE FOOD, HOUSING, MEDICAL CARE, AND HEATING?: NOT HARD AT ALL
HOW OFTEN DO YOU HAVE SIX OR MORE DRINKS ON ONE OCCASION: NEVER
DO YOU BELONG TO ANY CLUBS OR ORGANIZATIONS SUCH AS CHURCH GROUPS UNIONS, FRATERNAL OR ATHLETIC GROUPS, OR SCHOOL GROUPS?: YES
IN THE PAST 12 MONTHS, HAS THE ELECTRIC, GAS, OIL, OR WATER COMPANY THREATENED TO SHUT OFF SERVICE IN YOUR HOME?: NO
HOW OFTEN DO YOU HAVE A DRINK CONTAINING ALCOHOL: 2-4 TIMES A MONTH
HOW OFTEN DO YOU ATTEND CHURCH OR RELIGIOUS SERVICES?: MORE THAN 4 TIMES PER YEAR
IN A TYPICAL WEEK, HOW MANY TIMES DO YOU TALK ON THE PHONE WITH FAMILY, FRIENDS, OR NEIGHBORS?: MORE THAN THREE TIMES A WEEK
IN A TYPICAL WEEK, HOW MANY TIMES DO YOU TALK ON THE PHONE WITH FAMILY, FRIENDS, OR NEIGHBORS?: MORE THAN THREE TIMES A WEEK

## 2024-11-22 ENCOUNTER — OFFICE VISIT (OUTPATIENT)
Dept: MEDICAL GROUP | Facility: PHYSICIAN GROUP | Age: 74
End: 2024-11-22
Payer: MEDICARE

## 2024-11-22 VITALS
HEIGHT: 67 IN | OXYGEN SATURATION: 97 % | TEMPERATURE: 97.8 F | WEIGHT: 138 LBS | SYSTOLIC BLOOD PRESSURE: 100 MMHG | BODY MASS INDEX: 21.66 KG/M2 | DIASTOLIC BLOOD PRESSURE: 68 MMHG | HEART RATE: 63 BPM

## 2024-11-22 DIAGNOSIS — N39.46 MIXED STRESS AND URGE URINARY INCONTINENCE: ICD-10-CM

## 2024-11-22 DIAGNOSIS — E78.00 PURE HYPERCHOLESTEROLEMIA: ICD-10-CM

## 2024-11-22 DIAGNOSIS — I10 PRIMARY HYPERTENSION: ICD-10-CM

## 2024-11-22 DIAGNOSIS — Z12.31 ENCOUNTER FOR SCREENING MAMMOGRAM FOR MALIGNANT NEOPLASM OF BREAST: ICD-10-CM

## 2024-11-22 DIAGNOSIS — N18.31 STAGE 3A CHRONIC KIDNEY DISEASE: ICD-10-CM

## 2024-11-22 DIAGNOSIS — K52.9 CHRONIC DIARRHEA: ICD-10-CM

## 2024-11-22 DIAGNOSIS — M81.0 AGE-RELATED OSTEOPOROSIS WITHOUT CURRENT PATHOLOGICAL FRACTURE: ICD-10-CM

## 2024-11-22 DIAGNOSIS — T45.1X5A CHEMOTHERAPY-INDUCED NEUROPATHY (HCC): ICD-10-CM

## 2024-11-22 DIAGNOSIS — Z00.00 ENCOUNTER FOR MEDICARE ANNUAL WELLNESS EXAM: Primary | ICD-10-CM

## 2024-11-22 DIAGNOSIS — Z85.43 HISTORY OF OVARIAN CANCER: ICD-10-CM

## 2024-11-22 DIAGNOSIS — G62.0 CHEMOTHERAPY-INDUCED NEUROPATHY (HCC): ICD-10-CM

## 2024-11-22 DIAGNOSIS — R92.30 DENSE BREASTS: ICD-10-CM

## 2024-11-22 PROBLEM — Z86.018 HISTORY OF BENIGN SCHWANNOMA: Status: ACTIVE | Noted: 2024-11-22

## 2024-11-22 PROCEDURE — 3078F DIAST BP <80 MM HG: CPT | Performed by: FAMILY MEDICINE

## 2024-11-22 PROCEDURE — 3074F SYST BP LT 130 MM HG: CPT | Performed by: FAMILY MEDICINE

## 2024-11-22 PROCEDURE — G0439 PPPS, SUBSEQ VISIT: HCPCS | Performed by: FAMILY MEDICINE

## 2024-11-22 ASSESSMENT — FIBROSIS 4 INDEX: FIB4 SCORE: 1.29

## 2024-11-22 ASSESSMENT — ACTIVITIES OF DAILY LIVING (ADL): BATHING_REQUIRES_ASSISTANCE: 0

## 2024-11-22 ASSESSMENT — PATIENT HEALTH QUESTIONNAIRE - PHQ9: CLINICAL INTERPRETATION OF PHQ2 SCORE: 0

## 2024-11-22 ASSESSMENT — ENCOUNTER SYMPTOMS: GENERAL WELL-BEING: GOOD

## 2024-11-22 NOTE — PROGRESS NOTES
Chief Complaint   Patient presents with    Medicare Annual Wellness       HPI:  Zena Paz is a 74 y.o. here for Medicare Annual Wellness Visit     Patient Active Problem List    Diagnosis Date Noted    History of benign schwannoma 11/22/2024    Chronic diarrhea 11/20/2023    Vaginal atrophy 11/20/2023    Hyperlipidemia 10/24/2022    History of COVID-19 07/27/2022    Urinary incontinence 04/22/2022    PAC (premature atrial contraction) 02/28/2022    Environmental allergies 02/24/2020    Hot flashes 02/14/2020    Chronic pain of right knee 06/20/2019    History of small bowel obstruction 08/20/2018    Chronic kidney disease, stage 3, mod decreased GFR 08/14/2018    Age-related osteoporosis without current pathological fracture 08/14/2018    History of ovarian cancer 04/05/2018    Chemotherapy-induced neuropathy (HCC) 04/05/2018    Pancreatic insufficiency 04/05/2018    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 90 Tablet 3    CREON 50571-76795 units Cap DR Particles TAKE 3 CAPSULES BY MOUTH THREE TIMES DAILY WITH MEALS      LORazepam (ATIVAN) 1 MG Tab Take 0.5 mg by mouth. ONCE IN AWHILE. MAYBE A COUPLE TIMES OF YEAR      denosumab (PROLIA) 60 MG/ML Solution Prefilled Syringe injection Inject 60 mg under the skin. Twice a year      estrogens, conjugated (PREMARIN) 0.625 MG/GM Cream Insert 0.5 g into the vagina every day.      fluticasone (FLONASE) 50 MCG/ACT nasal spray Spray 1 Spray in nose every day.      Ayr Saline Nasal Gel Apply  to affected nostril(s). ONCE IN AWHILE IF NEEDED. BEEN ABOUT A YEAR      ondansetron (ZOFRAN) 8 MG Tab Take 8 mg  by mouth.      calcium citrate (CALCITRATE) 950 MG Tab Take 950 mg by mouth every day.      polyethyl glycol-propyl glycol (SYSTANE) 0.4-0.3 % Solution Place 1 Drop in both eyes every morning.      vitamin D (CHOLECALCIFEROL) 1000 UNIT TABS Take 2,000 Units by mouth every day.      Multiple Vitamins-Minerals (SENIOR MULTIVITAMIN PLUS PO) Take 1 Tab by mouth every morning.      docosahexanoic acid (OMEGA 3 FA) 1000 MG CAPS Take 1,000 mg by mouth every morning.       No current facility-administered medications for this visit.          Current supplements as per medication list.     Allergies: Latex and Losartan    Current social contact/activities: go to pool, spend time with , get together with friends.     She  reports that she has never smoked. She has never used smokeless tobacco. She reports current alcohol use of about 1.2 oz of alcohol per week. She reports that she does not use drugs.  Counseling given: Yes      ROS:    Gait: Uses no assistive device  Ostomy: No  Other tubes: No  Amputations: No  Chronic oxygen use: No  Last eye exam: 2 weeks ago  Wears hearing aids: No   : Reports urinary leakage during the last 6 months that has not interfered at all with their daily activities or sleep.    Screening:    Depression Screening  Little interest or pleasure in doing things?  0 - not at all  Feeling down, depressed , or hopeless? 0 - not at all  Patient Health Questionnaire Score: 0     If depressive symptoms identified deferred to follow up visit unless specifically addressed in assessment and plan.    Interpretation of PHQ-9 Total Score   Score Severity   1-4 No Depression   5-9 Mild Depression   10-14 Moderate Depression   15-19 Moderately Severe Depression   20-27 Severe Depression    Screening for Cognitive Impairment  Do you or any of your friends or family members have any concern about your memory? No  Three Minute Recall (Leader, Season, Table) 3/3    Kali clock face with all 12 numbers and set  the hands to show 10 minutes after 11.  Yes    Cognitive concerns identified deferred for follow up unless specifically addressed in assessment and plan.    Fall Risk Assessment  Has the patient had two or more falls in the last year or any fall with injury in the last year?  No    Safety Assessment  Do you always wear your seatbelt?  Yes  Any changes to home needed to function safely? No  Difficulty hearing.  No  Patient counseled about all safety risks that were identified.    Functional Assessment ADLs  Are there any barriers preventing you from cooking for yourself or meeting nutritional needs?  No.    Are there any barriers preventing you from driving safely or obtaining transportation?  No.    Are there any barriers preventing you from using a telephone or calling for help?  No    Are there any barriers preventing you from shopping?  No.    Are there any barriers preventing you from taking care of your own finances?  No    Are there any barriers preventing you from managing your medications?  No    Are there any barriers preventing you from showering, bathing or dressing yourself? No    Are there any barriers preventing you from doing housework or laundry? No  Are there any barriers preventing you from using the toilet?No  Are you currently engaging in any exercise or physical activity?  Yes. Swimming, walking, hiking and pilates     Self-Assessment of Health  What is your perception of your health? Good    Do you sleep more than six hours a night? Yes    In the past 7 days, how much did pain keep you from doing your normal work? None    Do you spend quality time with family or friends (virtually or in person)? Yes    Do you usually eat a heart healthy diet that constists of a variety of fruits, vegetables, whole grains and fiber? Yes    Do you eat foods high in fat and/or Fast Food more than three times per week? No    How concerned are you that your medical conditions are not being well managed? Not at all     Are you worried that in the next 2 months, you may not have stable housing that you own, rent, or stay in as part of a household? No      Advance Care Planning  Do you have an Advance Directive, Living Will, Durable Power of , or POLST? Yes  Advance Directive       is on file      Health Maintenance Summary            Ordered - Mammogram (Yearly) Ordered on 11/22/2024 11/28/2022  MA-SCREENING MAMMO BILAT W/TOMOSYNTHESIS W/CAD    10/25/2021  MA-SCREENING MAMMO BILAT W/TOMOSYNTHESIS W/CAD    09/28/2020  MA-SCREENING MAMMO BILAT W/TOMOSYNTHESIS W/CAD    09/26/2019  MA-SCREENING MAMMO BILAT W/TOMOSYNTHESIS W/CAD    09/18/2018  MA-MAMMO SCREENING BILAT W/ABDI W/CAD    Only the first 5 history entries have been loaded, but more history exists.              Annual Wellness Visit (Yearly) Next due on 11/22/2025 11/22/2024  Visit Dx: Encounter for Medicare annual wellness exam    11/20/2023  Visit Dx: Encounter for Medicare annual wellness exam    11/20/2023  Level of Service: ANNUAL WELLNESS VISIT-INCLUDES PPPS SUBSEQUE*    10/24/2022  Visit Dx: Encounter for Medicare annual wellness exam    10/24/2022  Level of Service: LA ANNUAL WELLNESS VISIT-INCLUDES PPPS SUBSEQUE*    Only the first 5 history entries have been loaded, but more history exists.              Bone Density Scan (Every 5 Years) Tentatively due on 1/25/2028 01/25/2023  Outside Procedure: DS-BONE DENSITY STUDY (DEXA)    01/06/2020  Outside Procedure: DS-BONE DENSITY STUDY (DEXA)              IMM DTaP/Tdap/Td Vaccine (3 - Td or Tdap) Next due on 11/10/2030      11/10/2020  Imm Admin: Tdap Vaccine    10/05/2010  Imm Admin: Tdap Vaccine    07/19/2007  Imm Admin: TD Vaccine              Colorectal Cancer Screening (Colonoscopy - Every 7 Years) Next due on 7/20/2031 07/20/2024  COLONOSCOPY RESULTS    07/15/2024  COLONOSCOPY RESULTS    07/19/2021  REFERRAL TO GI FOR COLONOSCOPY    06/28/2016  REFERRAL TO GI FOR COLONOSCOPY               Hepatitis B Vaccine (Hep B) (Series Information) Completed      2017  Imm Admin: Hepatitis B Vaccine (Adol/Adult)    2017  Imm Admin: Hepatitis B Vaccine Adolescent/Pediatric    2014  Imm Admin: Hepatitis B Vaccine (Adol/Adult)    2014  Imm Admin: Hepatitis B Vaccine Adolescent/Pediatric    2012  Imm Admin: Hepatitis B Vaccine (Adol/Adult)    Only the first 5 history entries have been loaded, but more history exists.              Pneumococcal Vaccine: 65+ Years (Series Information) Completed      2019  Imm Admin: Pneumococcal polysaccharide vaccine (PPSV-23)    2017  Imm Admin: Pneumococcal Conjugate Vaccine (Prevnar/PCV-13)    10/17/2012  Imm Admin: Pneumococcal polysaccharide vaccine (PPSV-23)              Zoster (Shingles) Vaccines (Series Information) Completed      2019  Imm Admin: Zoster Vaccine Recombinant (RZV) (SHINGRIX)    2019  Imm Admin: Zoster Vaccine Recombinant (RZV) (SHINGRIX)    2015  Imm Admin: Zoster Vaccine Live (ZVL) (Zostavax) - HISTORICAL DATA              Hepatitis A Vaccine (Hep A) (Series Information) Aged Out      2021  Imm Admin: Hepatitis A Vaccine, Adult    2018  Imm Admin: Hepatitis A Vaccine, Adult              Hepatitis C Screening  Completed      2022  Outside Procedure: HEP C VIRUS ANTIBODY    2020  Hepatitis C Antibody component of HCV Scrn ( [id number] 1xLife)              COVID-19 Vaccine (Series Information) Completed      09/10/2024  Imm Admin: COVID-19, mRNA, LNP-S, PF, marielena-sucrose, 30 mcg/0.3 mL    2024  Imm Admin: COVID-19, mRNA, LNP-S, PF, marielena-sucrose, 30 mcg/0.3 mL    2023  Imm Admin: PFIZER PURPLE CAP SARS-COV-2 VACCINATION (12+)    2023  Imm Admin: PFIZER BIVALENT SARS-COV-2 VACCINE (12+)    2022  Imm Admin: PFIZER BIVALENT SARS-COV-2 VACCINE (12+)    Only the first 5 history entries have been loaded, but more history exists.              Influenza Vaccine  (Series Information) Completed      09/30/2024  Imm Admin: Influenza high-dose trivalent (PF)    09/29/2023  Imm Admin: Influenza Vaccine Adult HD    09/01/2023  Imm Admin: Influenza Seasonal Injectable - Historical Data    09/19/2022  Imm Admin: Influenza Vaccine Adult HD    10/01/2021  Imm Admin: Influenza, unspecified formulation    Only the first 5 history entries have been loaded, but more history exists.              HPV Vaccines (Series Information) Aged Out      No completion history exists for this topic.              Meningococcal Immunization (Series Information) Aged Out      No completion history exists for this topic.              Discontinued - Polio Vaccine (Inactivated Polio)  Discontinued      12/14/2011  Imm Admin: IPV                    Patient Care Team:  Martina Grajeda M.D. as PCP - General (Family Medicine)  Yadira Jaffe, PT, MPT, OCS (Inactive) as Physical Therapist (Physical Therapy)  Neil Scott M.D. (Obstetrics & Gynecology)  Neelam Montalvo M.D. as Consulting Physician (Cardiovascular Disease (Cardiology))  Juan Dudley M.D. as Consulting Physician (Orthopedic Surgery)  Jose Ferrer M.D. as Consulting Physician (Gastroenterology)  Lidia Hsieh M.D. (Nephrology)        Social History     Tobacco Use    Smoking status: Never    Smokeless tobacco: Never   Vaping Use    Vaping status: Never Used   Substance Use Topics    Alcohol use: Yes     Alcohol/week: 1.2 oz     Types: 1 Glasses of wine, 1 Cans of beer per week     Comment: 2 drinks per week on average     Drug use: No     Family History   Problem Relation Age of Onset    Heart Disease Mother         A fib    Stroke Father     Cancer Father         Melanoma    Cancer Maternal Grandmother         Uterine    Cancer Maternal Grandfather         Lung    Cancer Sister         Melanoma    Cancer Brother         Melanoma    Heart Disease Brother         A fib    Cancer Brother         Melanoma    Ovarian Cancer Neg Hx   "   Tubal Cancer Neg Hx     Peritoneal Cancer Neg Hx     Colorectal Cancer Neg Hx     Breast Cancer Neg Hx      She  has a past medical history of Allergy, Arrhythmia, Arthritis, Backpain, Blood transfusion without reported diagnosis, Bowel obstruction (HCC), Cancer (HCC), Heart murmur, Hyperlipidemia, Hypertension, Indigestion, Kidney disease, Osteoporosis, Other specified symptom associated with female genital organs, Ovarian cancer (HCC), and Schwannoma of cranial nerve (HCC).   Past Surgical History:   Procedure Laterality Date    PB EXC LINK/VAS MAL SFT TIS HAND/FNGR SUBFASC* Right 08/04/2023    Procedure: RIGHT SMALL FINGER MASS EXCISION;  Surgeon: Yudelka Juan M.D.;  Location: [facility];  Service: Orthopedics    HYSTERECTOMY RADICAL  2012    OOPHORECTOMY Bilateral 2012    FUSION, SPINE, LUMBAR, PLIF  2001    CRANIOTOMY  1993    Schwannoma L 5th CN    EYE SURGERY         Exam:   /68 (BP Location: Left arm, Patient Position: Sitting, BP Cuff Size: Adult)   Pulse 63   Temp 36.6 °C (97.8 °F) (Temporal)   Ht 1.702 m (5' 7\")   Wt 62.6 kg (138 lb)   SpO2 97%  Body mass index is 21.61 kg/m².    Hearing good.    Dentition good  Alert, oriented in no acute distress.  Eye contact is good, speech goal directed, affect calm    Assessment and Plan. The following treatment and monitoring plan is recommended:      1. Encounter for Medicare annual wellness exam        2. Encounter for screening mammogram for malignant neoplasm of breast  MA-SCREENING MAMMO BILAT W/TOMOSYNTHESIS W/CAD      3. Dense breasts  US-SCREENING WHOLE BREAST BILATERAL (3D SCREENING)      4. Chemotherapy-induced neuropathy (HCC)        5. Mixed stress and urge urinary incontinence        6. Age-related osteoporosis without current pathological fracture        7. Primary hypertension        8. Pure hypercholesterolemia  Lipid Profile      9. Chronic diarrhea        10. Stage 3a chronic kidney disease        11. " History of ovarian cancer  CANCER ANTIGEN 125        Assessment & Plan  1. Medicare annual visit - No acute concerns, vaccines are up to date  - Mammogram ordered  - Screening ultrasound ordered due to dense breasts    2. Chemotherapy-induced neuropathy - Chronic and stable  - Continue to monitor yearly  - Avoid nephrotoxic agents    3. Mixed stress and urge urinary incontinence - Chronic and stable  - Completed a year of pelvic floor therapy  - Considering a procedure  - Follow up with the specialist as recommended    4. Osteoporosis - Chronic and managed by endocr[facility]ology at [facility]  - Get a DEXA scan as requested by endocrinology  - Continue Prolia as prescribed    5. Hypertension - Chronic and controlled  - Blood pressure is at goal in the office today  - Continue atenolol 25 mg nightly    6. Hypercholesterolemia - Chronic and uncontrolled. Coronary calcium score 0 in 2023.  - Cholesterol panel in August 2024 showed an LDL of 182 with an elevated HDL of 96  - Repeat cholesterol panel to be ordered to see if there has been improvement    7. Chronic diarrhea - Chronic and stable  - Managing with a banana and Metamucil before morning coffee  - Follow up with GI as directed  - Continue current regimen    8. Stage IIIA chronic kidney disease - Chronic and stable, secondary to chemotherapy  - Recent labs are stable  - Monitor kidney function every 6 months    9. History of ovarian cancer - Chronic and stable  - Monitor CA-125 every 3 months, ordered today      Services suggested: No services needed at this time  Health Care Screening: Age-appropriate preventive services recommended by USPTF and ACIP covered by Medicare were discussed today. Services ordered if indicated and agreed upon by the patient.  Referrals offered: Community-based lifestyle interventions to reduce health risks and promote self-management and wellness, fall prevention, nutrition, physical activity, tobacco-use cessation, weight loss, and mental  health services as per orders if indicated.    Discussion today about general wellness and lifestyle habits:    Prevent falls and reduce trip hazards; Cautioned about securing or removing rugs.  Have a working fire alarm and carbon monoxide detector;   Engage in regular physical activity and social activities     Follow-up: Return in about 6 months (around 5/22/2025) for Med check.

## 2024-12-19 ENCOUNTER — OFFICE VISIT (OUTPATIENT)
Dept: DERMATOLOGY | Facility: IMAGING CENTER | Age: 74
End: 2024-12-19
Payer: MEDICARE

## 2024-12-19 DIAGNOSIS — L82.1 SK (SEBORRHEIC KERATOSIS): ICD-10-CM

## 2024-12-19 DIAGNOSIS — D18.01 CHERRY ANGIOMA: ICD-10-CM

## 2024-12-19 DIAGNOSIS — L81.4 LENTIGINES: ICD-10-CM

## 2024-12-19 DIAGNOSIS — Z12.83 SKIN CANCER SCREENING: ICD-10-CM

## 2024-12-19 DIAGNOSIS — D22.9 MULTIPLE NEVI: ICD-10-CM

## 2024-12-19 DIAGNOSIS — L73.8 SEBACEOUS HYPERPLASIA: ICD-10-CM

## 2024-12-19 PROCEDURE — 99212 OFFICE O/P EST SF 10 MIN: CPT | Performed by: NURSE PRACTITIONER

## 2024-12-19 NOTE — PROGRESS NOTES
DERMATOLOGY NOTE  FOLLOW UP VISIT       Chief complaint: Annual Exam    Patient has a mole of concern on her back.    History of skin cancer: Yes, Details: possible bcc to right cheek. No excision. approx 2015  History of precancers/actinic keratoses: Yes, Details: yes  History of biopsies:Yes, Details: moles, atypical, DF on arm  History of blistering/severe sunburns:Yes, Details: as a child  Family history of skin cancer:Yes, Details: Father and 3 siblings with melanoma     Allergies   Allergen Reactions    Latex Rash     Rash      Losartan      palpitations        MEDICATIONS:  Medications relevant to specialty reviewed.     REVIEW OF SYSTEMS:   Positive for skin (see HPI)  Negative for fevers and chills       EXAM:  There were no vitals taken for this visit.  Constitutional: Well-developed, well-nourished, and in no distress.     A total body skin exam was performed excluding the genitals per patient preference and including the following areas: head (including face), neck, chest, abdomen, groin/buttocks, back, bilateral upper extremities, and bilateral lower extremities with the following pertinent findings listed below and/or in assessment/plan.     -sun exposed skin of trunk and b/l upper, lower extremities and face with scattered clinically benign light brown reticulated macules all of which were morphologically similar and none of which were suspicious for skin cancer today on exam    -Several scattered 1-3mm bright red macules and thin papules on the trunk and extremities    -Multiple tan medium brown skin-colored macules papules scattered over the trunk >> extremities--All with benign-appearing pigment network patterns on dermoscopy    -Several tan medium brown stuck-on waxy papules scattered on the trunk and extremities    -Scattered sebaceous hyperplasia on the face    IMPRESSION / PLAN:    1. Lentigines  - Benign-appearing nature of lesions discussed during exam.   - Advised to continue to monitor for  any return to clinic for new or concerning changes.      2. Cherry angioma  - Benign-appearing nature of lesions discussed during exam.   - Advised to continue to monitor for any return to clinic for new or concerning changes.      3. Multiple nevi  - Benign-appearing nature of lesions discussed during exam.   - Advised to continue to monitor for any return to clinic for new or concerning changes.      4. SK (seborrheic keratosis)  - Benign-appearing nature of lesions discussed during exam.   - Advised to continue to monitor for any return to clinic for new or concerning changes.      5. Sebaceous hyperplasia  - Benign-appearing nature of lesions discussed during exam.   - Advised to continue to monitor for any return to clinic for new or concerning changes.      6. Skin cancer screening  Skin cancer education  discussed importance of sun protective clothing, eyewear in addition to the use of broad spectrum sunscreen with SPF 30 or greater, as well as need for reapplication ~every 2 hours when exposed to UVR  discussed importance following up for any new or changing lesions as noted in handout given, but every 12 months exams in clinic in the setting of dermatologic history  ABCDE's of melanoma discussed/handout given        I have performed a physical exam and reviewed and updated ROS and Plan today (12/19/2024). In review of dermatology visit (12/18/2023), there are no changes except as documented above.       Please note that this dictation was created using voice recognition software. I have made every reasonable attempt to correct obvious errors, but I expect that there are errors of grammar and possibly content that I did not discover before finalizing the note.      Return to clinic in: Return in about 1 year (around 12/19/2025) for ROBERT. and as needed for any new or changing skin lesions.

## 2024-12-20 ENCOUNTER — OFFICE VISIT (OUTPATIENT)
Dept: MEDICAL GROUP | Facility: PHYSICIAN GROUP | Age: 74
End: 2024-12-20
Payer: MEDICARE

## 2024-12-20 VITALS
TEMPERATURE: 97.2 F | BODY MASS INDEX: 21.52 KG/M2 | SYSTOLIC BLOOD PRESSURE: 116 MMHG | DIASTOLIC BLOOD PRESSURE: 72 MMHG | OXYGEN SATURATION: 98 % | HEART RATE: 69 BPM | HEIGHT: 67 IN | WEIGHT: 137.13 LBS

## 2024-12-20 DIAGNOSIS — Z91.09 ENVIRONMENTAL ALLERGIES: ICD-10-CM

## 2024-12-20 DIAGNOSIS — R07.89 CHEST DISCOMFORT: ICD-10-CM

## 2024-12-20 DIAGNOSIS — I10 PRIMARY HYPERTENSION: ICD-10-CM

## 2024-12-20 DIAGNOSIS — M81.0 AGE-RELATED OSTEOPOROSIS WITHOUT CURRENT PATHOLOGICAL FRACTURE: ICD-10-CM

## 2024-12-20 PROCEDURE — 3078F DIAST BP <80 MM HG: CPT | Performed by: STUDENT IN AN ORGANIZED HEALTH CARE EDUCATION/TRAINING PROGRAM

## 2024-12-20 PROCEDURE — 3074F SYST BP LT 130 MM HG: CPT | Performed by: STUDENT IN AN ORGANIZED HEALTH CARE EDUCATION/TRAINING PROGRAM

## 2024-12-20 PROCEDURE — 99214 OFFICE O/P EST MOD 30 MIN: CPT | Performed by: STUDENT IN AN ORGANIZED HEALTH CARE EDUCATION/TRAINING PROGRAM

## 2024-12-20 RX ORDER — MONTELUKAST SODIUM 10 MG/1
10 TABLET ORAL DAILY
Qty: 90 TABLET | Refills: 0 | Status: SHIPPED | OUTPATIENT
Start: 2024-12-20

## 2024-12-20 ASSESSMENT — FIBROSIS 4 INDEX: FIB4 SCORE: 1.29

## 2024-12-20 NOTE — PROGRESS NOTES
"Subjective:     Chief Complaint   Patient presents with    Chest Pain     Had it last night, and had it 12/11/2024, SOB, when took a deep breath it hurt, took Pepcid and chewed aspirin (it helped)       History of Present Illness  The patient presents for evaluation of heartburn.    She has experienced 2 episodes of heartburn, both of which were more severe during the night. The most recent episode occurred a week ago, accompanied by shoulder pain and a sensation of heaviness. She reports that her sleep has been disrupted due to these symptoms. She has a family history of atrial fibrillation and personal history of premature atrial contractions for which she has seen Cardiology. EKG in April was normal and Echo in 2022 was normal. May 2023 coronary calcium score 0. She has an upcoming appointment with her cardiologist in January 2025. She has found relief with Pepcid and aspirin.    She suffers from severe allergies, which are exacerbated by dry air. She has not previously been prescribed Singulair. She uses Flonase for symptom management but finds it insufficient, leading her to supplement with Benadryl. She expresses concern about the potential impact of these medications on her cardiac and renal health.    She has a history of chemotherapy for ovarian cancer, which has resulted in osteoporosis. She continues to see her oncologist annually and is due for a CA-125 test. She follows up with [facility] for osteoporosis. She has a mammogram and ultrasound scheduled.          Health Maintenance: Completed    ROS:  Negative except as stated above.      Objective:     Exam:  /72 (BP Location: Right arm, Patient Position: Sitting, BP Cuff Size: Small adult)   Pulse 69   Temp 36.2 °C (97.2 °F) (Temporal)   Ht 1.702 m (5' 7\")   Wt 62.2 kg (137 lb 2 oz)   SpO2 98%   BMI 21.48 kg/m²  Body mass index is 21.48 kg/m².    Physical Exam    Gen: Alert and oriented, no acute distress.  ENMT: Oropharyngeal " erythema without exudate.  No tonsillar hypertrophy or exudate.  Lungs: Normal effort, CTAB, no wheezing / rhonchi / rales.  CV: RRR, normal S1 and S2, no murmurs.      Assessment & Plan:     74 y.o. female with the following -     1. Chest discomfort  Acute.  2 recent episodes while laying down at night.  Symptoms seems consistent with heartburn.  She reports no symptoms with activity or exertion.  She follows up with cardiology and has appointment in January.  EKG and Echo were normal.  Coronary calcium score 0. Cardiac etiology unlikely. She was advised to take Pepcid nightly for 1 month and monitor symptoms.    2. Environmental allergies  Chronic, uncontrolled.  No relief with OTC antihistamine and intranasal corticosteroid.  Prescribed singulair 10 mg daily.  Side effects of medication were discussed.  - montelukast (SINGULAIR) 10 MG Tab; Take 1 Tablet by mouth every day.  Dispense: 90 Tablet; Refill: 0    3. Primary hypertension  Chronic, controlled.  /72 today.  Continue atenolol 25 mg daily.    4. Age-related osteoporosis without current pathological fracture  Chronic, uncontrolled.  History of chemotherapy for ovarian cancer, which resulted in osteoporosis.  She follows up with [facility].  DEXA last done January 2023 and repeat ordered.  Continue Prolia every 6 months.  - DS-BONE DENSITY STUDY (DEXA); Future        Return in about 2 months (around 2/20/2025) for establish care.    Verbal consent was acquired by the patient to use [employer] ambient listening note generation during this visit: Yes.    Please note that this dictation was created using voice recognition software. I have made every reasonable attempt to correct obvious errors, but I expect that there are errors of grammar and possibly content that I did not discover before finalizing the note.

## 2025-01-09 RX ORDER — ATENOLOL 25 MG/1
25 TABLET ORAL EVERY EVENING
Qty: 90 TABLET | Refills: 0 | Status: SHIPPED | OUTPATIENT
Start: 2025-01-09

## 2025-01-09 NOTE — TELEPHONE ENCOUNTER
Received request via: Pharmacy    Was the patient seen in the last year in this department? Yes    Does the patient have an active prescription (recently filled or refills available) for medication(s) requested? No    Pharmacy Name: [facility]    Does the patient have senior living Plus and need 100-day supply? (This applies to ALL medications) Patient does not have SCP

## 2025-01-10 ENCOUNTER — OFFICE VISIT (OUTPATIENT)
Dept: CARDIOLOGY | Facility: MEDICAL CENTER | Age: 75
End: 2025-01-10
Attending: STUDENT IN AN ORGANIZED HEALTH CARE EDUCATION/TRAINING PROGRAM
Payer: MEDICARE

## 2025-01-10 VITALS
HEART RATE: 70 BPM | OXYGEN SATURATION: 97 % | HEIGHT: 67 IN | DIASTOLIC BLOOD PRESSURE: 66 MMHG | RESPIRATION RATE: 16 BRPM | BODY MASS INDEX: 20.88 KG/M2 | SYSTOLIC BLOOD PRESSURE: 104 MMHG | WEIGHT: 133 LBS

## 2025-01-10 DIAGNOSIS — N18.31 STAGE 3A CHRONIC KIDNEY DISEASE: ICD-10-CM

## 2025-01-10 DIAGNOSIS — I49.1 PAC (PREMATURE ATRIAL CONTRACTION): ICD-10-CM

## 2025-01-10 DIAGNOSIS — Z85.43 HISTORY OF OVARIAN CANCER: ICD-10-CM

## 2025-01-10 PROCEDURE — 99213 OFFICE O/P EST LOW 20 MIN: CPT | Performed by: STUDENT IN AN ORGANIZED HEALTH CARE EDUCATION/TRAINING PROGRAM

## 2025-01-10 PROCEDURE — 3078F DIAST BP <80 MM HG: CPT | Performed by: STUDENT IN AN ORGANIZED HEALTH CARE EDUCATION/TRAINING PROGRAM

## 2025-01-10 PROCEDURE — 3074F SYST BP LT 130 MM HG: CPT | Performed by: STUDENT IN AN ORGANIZED HEALTH CARE EDUCATION/TRAINING PROGRAM

## 2025-01-10 PROCEDURE — 93005 ELECTROCARDIOGRAM TRACING: CPT | Mod: TC | Performed by: STUDENT IN AN ORGANIZED HEALTH CARE EDUCATION/TRAINING PROGRAM

## 2025-01-10 PROCEDURE — 99204 OFFICE O/P NEW MOD 45 MIN: CPT | Performed by: STUDENT IN AN ORGANIZED HEALTH CARE EDUCATION/TRAINING PROGRAM

## 2025-01-10 ASSESSMENT — FIBROSIS 4 INDEX: FIB4 SCORE: 1.29

## 2025-01-10 NOTE — PROGRESS NOTES
Arrhythmia Clinic Note (New EP patient)    DOS: 1/10/2025     Chief complaint/Reason for consult: History of PACs    Referring provider: Ramonita Wolf M.D     Interval History:  Ms.Susan Paloma Paz is a 74 years old female with medical history of PACs, chronic kidney disease stage III, oral cancer status post chemotherapy in remission came for new EP establishment.  She followed Dr. Paul for PAC management and has been on atenolol 25 mg daily.  She has been doing well and she had similar palpitations in Dec/2024 and put on Pepcid by PCP with resolved symptoms. She denies dizziness, chest pain, palpitations, shortness of breath, syncope.        ROS (+ highlighted in red):  General--Negative for fatigue, weight loss or weight gain  Cardiovascular--Negative for palpitations, CP, orthopnea, PND, syncope    Past Medical History:   Diagnosis Date    Allergy     Arrhythmia     Arthritis     Backpain     had back surgery, s1-l5 fusion    Blood transfusion without reported diagnosis     Bowel obstruction (HCC)     Cancer (HCC)     overian cancer    Heart murmur     Hyperlipidemia     Hypertension     Indigestion     sometimes    Kidney disease     Osteoporosis     Other specified symptom associated with female genital organs     had ca ov the ovary    Ovarian cancer (HCC)     Schwannoma of cranial nerve (HCC)        Past Surgical History:   Procedure Laterality Date    PB EXC LINK/VAS MAL SFT TIS HAND/FNGR SUBFASC* Right 08/04/2023    Procedure: RIGHT SMALL FINGER MASS EXCISION;  Surgeon: Yudelka Juan M.D.;  Location: [facility];  Service: Orthopedics    HYSTERECTOMY RADICAL  2012    OOPHORECTOMY Bilateral 2012    FUSION, SPINE, LUMBAR, PLIF  2001    CRANIOTOMY  1993    Schwannoma L 5th CN    EYE SURGERY         Social History     Socioeconomic History    Marital status:      Spouse name: Not on file    Number of children: Not on file    Years of education: Not on file     Highest education level: Master's degree (e.g., MA, MS, Vikram, MEd, MSW, ANA)   Occupational History    Not on file   Tobacco Use    Smoking status: Never    Smokeless tobacco: Never   Vaping Use    Vaping status: Never Used   Substance and Sexual Activity    Alcohol use: Yes     Alcohol/week: 1.2 oz     Types: 1 Glasses of wine, 1 Cans of beer per week     Comment: 2 drinks per week on average     Drug use: No    Sexual activity: Yes     Partners: Male     Birth control/protection: Male Sterilization, Post-Menopausal   Other Topics Concern     Service Not Asked    Blood Transfusions Not Asked    Caffeine Concern Not Asked    Occupational Exposure Not Asked    Hobby Hazards Not Asked    Sleep Concern No    Stress Concern Not Asked    Weight Concern No    Special Diet Not Asked    Back Care Not Asked    Exercise Yes    Bike Helmet Not Asked    Seat Belt Not Asked    Self-Exams Not Asked   Social History Narrative    Not on file     Social Drivers of Health     Financial Resource Strain: Low Risk  (11/19/2024)    Overall Financial Resource Strain (CARDIA)     Difficulty of Paying Living Expenses: Not hard at all   Food Insecurity: No Food Insecurity (11/19/2024)    Hunger Vital Sign     Worried About Running Out of Food in the Last Year: Never true     Ran Out of Food in the Last Year: Never true   Transportation Needs: No Transportation Needs (11/19/2024)    PRAPARE - Transportation     Lack of Transportation (Medical): No     Lack of Transportation (Non-Medical): No   Physical Activity: Sufficiently Active (11/19/2024)    Exercise Vital Sign     Days of Exercise per Week: 7 days     Minutes of Exercise per Session: 60 min   Stress: No Stress Concern Present (11/19/2024)    [employer] - Occupational Stress Questionnaire     Feeling of Stress : Only a little   Social Connections: Socially Integrated (11/19/2024)    Social Connection and Isolation Panel [NHANES]     Frequency of  Communication with Friends and Family: More than three times a week     Frequency of Social Gatherings with Friends and Family: Once a week     Attends Voodoo Services: More than 4 times per year     Active Member of Clubs or Organizations: Yes     Attends Club or Organization Meetings: More than 4 times per year     Marital Status:    Intimate Partner Violence: Not on file   Housing Stability: Low Risk  (11/19/2024)    Housing Stability Vital Sign     Unable to Pay for Housing in the Last Year: No     Number of Times Moved in the Last Year: 0     Homeless in the Last Year: No       Family History   Problem Relation Age of Onset    Heart Disease Mother         A fib    Stroke Father     Cancer Father         Melanoma    Cancer Maternal Grandmother         Uterine    Cancer Maternal Grandfather         Lung    Cancer Sister         Melanoma    Cancer Brother         Melanoma    Heart Disease Brother         A fib    Cancer Brother         Melanoma    Ovarian Cancer Neg Hx     Tubal Cancer Neg Hx     Peritoneal Cancer Neg Hx     Colorectal Cancer Neg Hx     Breast Cancer Neg Hx        Allergies   Allergen Reactions    Latex Rash     Rash      Losartan      palpitations       Current Outpatient Medications   Medication Sig Dispense Refill    atenolol (TENORMIN) 25 MG Tab TAKE 1 TABLET BY MOUTH EVERY EVENING 90 Tablet 0    escitalopram (LEXAPRO) 10 MG Tab TAKE 1 TABLET BY MOUTH EVERY MORNING 90 Tablet 3    azithromycin (ZITHROMAX) 500 MG tablet TAKE 2 TABLETS BY MOUTH FOR ONE DOSE      LORazepam (ATIVAN) 1 MG Tab Take 0.5 mg by mouth. ONCE IN AWHILE. MAYBE A COUPLE TIMES OF YEAR      denosumab (PROLIA) 60 MG/ML Solution Prefilled Syringe injection Inject 60 mg under the skin. Twice a year      estrogens, conjugated (PREMARIN) 0.625 MG/GM Cream Insert 0.5 g into the vagina every day.      fluticasone (FLONASE) 50 MCG/ACT nasal spray Spray 1 Spray in nose every day.      Ayr Saline Nasal Gel Apply  to affected  "nostril(s). ONCE IN AWHILE IF NEEDED. BEEN ABOUT A YEAR      ondansetron (ZOFRAN) 8 MG Tab Take 8 mg by mouth.      calcium citrate (CALCITRATE) 950 MG Tab Take 950 mg by mouth every day.      polyethyl glycol-propyl glycol (SYSTANE) 0.4-0.3 % Solution Place 1 Drop in both eyes every morning.      vitamin D (CHOLECALCIFEROL) 1000 UNIT TABS Take 2,000 Units by mouth every day.      Multiple Vitamins-Minerals (SENIOR MULTIVITAMIN PLUS PO) Take 1 Tab by mouth every morning.      docosahexanoic acid (OMEGA 3 FA) 1000 MG CAPS Take 1,000 mg by mouth every morning.      montelukast (SINGULAIR) 10 MG Tab Take 1 Tablet by mouth every day. (Patient not taking: Reported on 1/10/2025) 90 Tablet 0    CREON 38288-48682 units Cap DR Particles TAKE 3 CAPSULES BY MOUTH THREE TIMES DAILY WITH MEALS       No current facility-administered medications for this visit.       Physical Exam:  Vitals:    01/10/25 1517   BP: 104/66   BP Location: Left arm   Patient Position: Sitting   BP Cuff Size: Adult   Pulse: 70   Resp: 16   SpO2: 97%   Weight: 60.3 kg (133 lb)   Height: 1.702 m (5' 7\")     General appearance: NAD, conversant  HEENT: PERRL, neck is supple with FROM  Lungs: Clear to auscultation, normal respiratory effort  CV: RRR, no murmurs/rubs/gallops, no JVD  Abdomen: Soft, non-tender with normal bowel sounds  Extremities: No peripheral edema, no clubbing or cyanosis  Skin: No rash, lesions, or ulcers  Psych: Alert and oriented to person, place and time    Data:  Labs reviewed:  Hgb 15, Cr 1.3, GFR 46    Prior echo reviewed:  2022, Preserved LVEF, normal LV wall thickness.  Normal RV size and systolic function, normal size of left atrium, no significant valvular disease    EKG interpreted by me:        Impression/Plan:  1. PAC (premature atrial contraction)  EKG    EKG          - I discussed with Ms.Susan Paloma Paz the etiologies of PACs. Low burden PACs. I discussed with her possible cutting down atenolol dose if worsening " kidney function or low blood pressure.   - I will see her in one year.    Robinson Benton MD, PhD  Cardiac Electrophysiologist

## 2025-01-13 LAB — EKG IMPRESSION: NORMAL

## 2025-01-13 PROCEDURE — 93010 ELECTROCARDIOGRAM REPORT: CPT | Performed by: STUDENT IN AN ORGANIZED HEALTH CARE EDUCATION/TRAINING PROGRAM

## 2025-01-14 ENCOUNTER — HOSPITAL ENCOUNTER (OUTPATIENT)
Dept: LAB | Facility: MEDICAL CENTER | Age: 75
End: 2025-01-14
Attending: FAMILY MEDICINE
Payer: MEDICARE

## 2025-01-14 DIAGNOSIS — E78.00 PURE HYPERCHOLESTEROLEMIA: ICD-10-CM

## 2025-01-14 DIAGNOSIS — Z85.43 HISTORY OF OVARIAN CANCER: ICD-10-CM

## 2025-01-14 LAB
CANCER AG125 SERPL-ACNC: 14.7 U/ML (ref 0–35)
CHOLEST SERPL-MCNC: 224 MG/DL (ref 100–199)
FASTING STATUS PATIENT QL REPORTED: NORMAL
HDLC SERPL-MCNC: 84 MG/DL
LDLC SERPL CALC-MCNC: 126 MG/DL
TRIGL SERPL-MCNC: 68 MG/DL (ref 0–149)

## 2025-01-14 PROCEDURE — 36415 COLL VENOUS BLD VENIPUNCTURE: CPT

## 2025-01-14 PROCEDURE — 86304 IMMUNOASSAY TUMOR CA 125: CPT

## 2025-01-14 PROCEDURE — 80061 LIPID PANEL: CPT

## 2025-01-31 ENCOUNTER — HOSPITAL ENCOUNTER (OUTPATIENT)
Dept: RADIOLOGY | Facility: MEDICAL CENTER | Age: 75
End: 2025-01-31
Attending: FAMILY MEDICINE
Payer: MEDICARE

## 2025-01-31 DIAGNOSIS — Z12.31 ENCOUNTER FOR SCREENING MAMMOGRAM FOR MALIGNANT NEOPLASM OF BREAST: ICD-10-CM

## 2025-01-31 DIAGNOSIS — R92.30 DENSE BREASTS: ICD-10-CM

## 2025-01-31 PROCEDURE — 76641 ULTRASOUND BREAST COMPLETE: CPT

## 2025-01-31 PROCEDURE — 77067 SCR MAMMO BI INCL CAD: CPT

## 2025-02-25 ENCOUNTER — HOSPITAL ENCOUNTER (OUTPATIENT)
Dept: RADIOLOGY | Facility: MEDICAL CENTER | Age: 75
End: 2025-02-25
Attending: STUDENT IN AN ORGANIZED HEALTH CARE EDUCATION/TRAINING PROGRAM
Payer: MEDICARE

## 2025-02-25 DIAGNOSIS — M81.0 AGE-RELATED OSTEOPOROSIS WITHOUT CURRENT PATHOLOGICAL FRACTURE: ICD-10-CM

## 2025-02-25 PROCEDURE — 77080 DXA BONE DENSITY AXIAL: CPT

## 2025-03-17 ENCOUNTER — RESULTS FOLLOW-UP (OUTPATIENT)
Dept: MEDICAL GROUP | Facility: PHYSICIAN GROUP | Age: 75
End: 2025-03-17
Payer: MEDICARE

## 2025-03-18 ENCOUNTER — OUTPATIENT INFUSION SERVICES (OUTPATIENT)
Dept: ONCOLOGY | Facility: MEDICAL CENTER | Age: 75
End: 2025-03-18
Attending: FAMILY MEDICINE
Payer: MEDICARE

## 2025-03-18 VITALS
TEMPERATURE: 97 F | HEIGHT: 67 IN | BODY MASS INDEX: 21.73 KG/M2 | SYSTOLIC BLOOD PRESSURE: 103 MMHG | DIASTOLIC BLOOD PRESSURE: 68 MMHG | OXYGEN SATURATION: 98 % | HEART RATE: 67 BPM | WEIGHT: 138.45 LBS | RESPIRATION RATE: 18 BRPM

## 2025-03-18 DIAGNOSIS — M81.0 AGE-RELATED OSTEOPOROSIS WITHOUT CURRENT PATHOLOGICAL FRACTURE: ICD-10-CM

## 2025-03-18 LAB
CA-I BLD ISE-SCNC: 1.17 MMOL/L (ref 1.1–1.3)
CREAT BLD-MCNC: 1.4 MG/DL (ref 0.5–1.4)

## 2025-03-18 PROCEDURE — 96372 THER/PROPH/DIAG INJ SC/IM: CPT

## 2025-03-18 PROCEDURE — 700111 HCHG RX REV CODE 636 W/ 250 OVERRIDE (IP): Mod: JZ,TB | Performed by: FAMILY MEDICINE

## 2025-03-18 PROCEDURE — 82565 ASSAY OF CREATININE: CPT

## 2025-03-18 PROCEDURE — 82330 ASSAY OF CALCIUM: CPT

## 2025-03-18 PROCEDURE — 36415 COLL VENOUS BLD VENIPUNCTURE: CPT

## 2025-03-18 RX ORDER — DIPHENHYDRAMINE HYDROCHLORIDE 50 MG/ML
50 INJECTION, SOLUTION INTRAMUSCULAR; INTRAVENOUS PRN
OUTPATIENT
Start: 2025-09-12

## 2025-03-18 RX ORDER — METHYLPREDNISOLONE SODIUM SUCCINATE 125 MG/2ML
125 INJECTION INTRAMUSCULAR; INTRAVENOUS PRN
OUTPATIENT
Start: 2025-09-12

## 2025-03-18 RX ORDER — EPINEPHRINE 1 MG/ML(1)
0.5 AMPUL (ML) INJECTION PRN
OUTPATIENT
Start: 2025-09-12

## 2025-03-18 RX ADMIN — DENOSUMAB 60 MG: 60 INJECTION SUBCUTANEOUS at 11:04

## 2025-03-18 ASSESSMENT — FIBROSIS 4 INDEX: FIB4 SCORE: 1.29

## 2025-03-18 NOTE — PROGRESS NOTES
Patient to [facility] for Prolia injection. Lab drawn by venipuncture in right AC, gauze and coban applied as a dressing. Patient meets parameters for prolia. Prolia injected into left back of arm. Emailed scheduling for future appt. Patient to home.

## 2025-04-07 RX ORDER — ATENOLOL 25 MG/1
25 TABLET ORAL EVERY EVENING
Qty: 90 TABLET | Refills: 1 | Status: SHIPPED | OUTPATIENT
Start: 2025-04-07

## 2025-04-07 NOTE — TELEPHONE ENCOUNTER
Received request via: Pharmacy    Was the patient seen in the last year in this department? Yes    Does the patient have an active prescription (recently filled or refills available) for medication(s) requested? No    Pharmacy Name: [facility]    Does the patient have residential Plus and need 100-day supply? (This applies to ALL medications) Patient does not have SCP

## 2025-06-12 ENCOUNTER — APPOINTMENT (OUTPATIENT)
Dept: LAB | Facility: MEDICAL CENTER | Age: 75
End: 2025-06-12
Payer: MEDICARE

## 2025-06-12 DIAGNOSIS — Z85.43 HISTORY OF OVARIAN CANCER: ICD-10-CM

## 2025-06-12 LAB — CANCER AG125 SERPL-ACNC: 14.2 U/ML (ref 0–35)

## 2025-06-12 PROCEDURE — 86304 IMMUNOASSAY TUMOR CA 125: CPT

## 2025-06-12 PROCEDURE — 36415 COLL VENOUS BLD VENIPUNCTURE: CPT

## 2025-06-13 ENCOUNTER — RESULTS FOLLOW-UP (OUTPATIENT)
Dept: MEDICAL GROUP | Facility: PHYSICIAN GROUP | Age: 75
End: 2025-06-13
Payer: MEDICARE

## 2025-06-26 ENCOUNTER — OFFICE VISIT (OUTPATIENT)
Dept: MEDICAL GROUP | Facility: PHYSICIAN GROUP | Age: 75
End: 2025-06-26
Payer: MEDICARE

## 2025-06-26 VITALS
TEMPERATURE: 96.9 F | WEIGHT: 141.2 LBS | OXYGEN SATURATION: 98 % | SYSTOLIC BLOOD PRESSURE: 118 MMHG | HEIGHT: 67 IN | DIASTOLIC BLOOD PRESSURE: 62 MMHG | HEART RATE: 70 BPM | BODY MASS INDEX: 22.16 KG/M2

## 2025-06-26 DIAGNOSIS — K52.9 CHRONIC DIARRHEA: ICD-10-CM

## 2025-06-26 DIAGNOSIS — N39.46 MIXED STRESS AND URGE URINARY INCONTINENCE: ICD-10-CM

## 2025-06-26 DIAGNOSIS — Z91.09 ENVIRONMENTAL ALLERGIES: ICD-10-CM

## 2025-06-26 DIAGNOSIS — K44.9 HIATAL HERNIA: ICD-10-CM

## 2025-06-26 DIAGNOSIS — M81.0 AGE-RELATED OSTEOPOROSIS WITHOUT CURRENT PATHOLOGICAL FRACTURE: Primary | ICD-10-CM

## 2025-06-26 DIAGNOSIS — K63.8219 SMALL INTESTINAL BACTERIAL OVERGROWTH (SIBO): ICD-10-CM

## 2025-06-26 PROCEDURE — 3078F DIAST BP <80 MM HG: CPT | Performed by: FAMILY MEDICINE

## 2025-06-26 PROCEDURE — 3074F SYST BP LT 130 MM HG: CPT | Performed by: FAMILY MEDICINE

## 2025-06-26 PROCEDURE — 99214 OFFICE O/P EST MOD 30 MIN: CPT | Performed by: FAMILY MEDICINE

## 2025-06-26 RX ORDER — CETIRIZINE HYDROCHLORIDE 10 MG/1
10 TABLET ORAL DAILY
COMMUNITY

## 2025-06-26 RX ORDER — SILICONE ADHESIVE 1.5" X 3"
1 SHEET (EA) TOPICAL PRN
COMMUNITY

## 2025-06-26 RX ORDER — DIPHENHYDRAMINE HCL 25 MG
25 TABLET ORAL EVERY 6 HOURS PRN
COMMUNITY

## 2025-06-26 ASSESSMENT — FIBROSIS 4 INDEX: FIB4 SCORE: 1.31

## 2025-06-26 ASSESSMENT — PATIENT HEALTH QUESTIONNAIRE - PHQ9: CLINICAL INTERPRETATION OF PHQ2 SCORE: 0

## 2025-06-26 NOTE — PROGRESS NOTES
Verbal consent was acquired by the patient to use [employer] ambient listening note generation during this visit     Subjective:     HPI:   History of Present Illness  The patient presents for evaluation of osteoporosis, hiatal hernia, chronic diarrhea from SIBO, environmental allergies, and urinary incontinence.    Osteoporosis  - She has been receiving Prolia injections every 6 months and is scheduled to see Dr. Bran in the summer.  - A bone density scan on 02/20/2025 showed worsening of the lumbar spine bone density but improvement in the left femur bone density.    Hiatal Hernia  - An endoscopy on 03/25/2025 revealed a 3 cm hiatal hernia.  - She is not currently on any medication for this condition.    Chronic Diarrhea from SIBO  - She consulted with Bethany Chicas regarding her diarrhea, which was not responding to Creon.  - A breath test suggested the possibility of Small Intestinal Bacterial Overgrowth (SIBO), which returned positive results for both SIBO and intestinal methanogen.  - She completed a 2-week course of Xifaxan and neomycin in early May 2025, which effectively resolved her symptoms.  - Her insurance will not cover another course of these medications.  - She has a follow-up appointment with Bethany Chicas in the fall.    Environmental Allergies  - She was prescribed montelukast for allergies by another provider but has not started this medication.  - She does not have asthma.  - She differentiates between allergies and colds based on the duration of symptoms, with allergies typically resolving overnight.  - She uses Flonase daily and takes Zyrtec or Benadryl as needed, usually limiting herself to one dose per day.  - She also uses Sofi 128 eye drops as needed for allergy symptoms, in addition to Systane.    Urinary Incontinence  - She continues to experience bladder incontinence despite undergoing pelvic floor therapy and Pilates.  - She is no longer seeing the specialist at [facility] and prefers  "to consult with Dr. Martínez locally.  - She is hesitant about surgical interventions.    Supplemental information: She had a mammogram and ultrasound in 2025, both of which were unremarkable. She had a CA-125 test on 2025 and plans to ask her specialist at [facility] if the frequency should be adjusted from every 3 months to possibly every 6 months.    PAST SURGICAL HISTORY:  Endoscopy on 2025  Endoscopy in 2023  Pelvic surgery (date not specified)    Health Maintenance: Completed    Objective:     Exam:  /62 (BP Location: Right arm, Patient Position: Sitting, BP Cuff Size: Adult)   Pulse 70   Temp 36.1 °C (96.9 °F) (Temporal)   Ht 1.702 m (5' 7\")   Wt 64 kg (141 lb 3.2 oz)   SpO2 98%   BMI 22.12 kg/m²  Body mass index is 22.12 kg/m².    Physical Exam  Constitutional:       Appearance: Normal appearance.   Cardiovascular:      Rate and Rhythm: Normal rate and regular rhythm.      Heart sounds: Normal heart sounds.   Pulmonary:      Effort: Pulmonary effort is normal.      Breath sounds: Normal breath sounds.   Musculoskeletal:      Cervical back: Normal range of motion and neck supple.   Lymphadenopathy:      Cervical: No cervical adenopathy.   Neurological:      Mental Status: She is alert.             Results  Labs   - : 2025, Normal    Imaging   - DEXA scan: 2025, Lumbar spine T-score of -1.6 and left femur T-score of -1.5 with a hip fracture risk of 1.4%   - Mammogram: 2025, Unremarkable   - Ultrasound: 2025, Unremarkable    Diagnostic Testing   - [id number], Premature atrial contractions   - Endoscopy: 2025, 3 cm hiatal hernia and two fundic gland gastric polyps were taken    Assessment & Plan:     1. Age-related osteoporosis without current pathological fracture        2. Hiatal hernia        3. Chronic diarrhea        4. Small intestinal bacterial overgrowth (SIBO)        5. Environmental allergies        6. Mixed stress and urge urinary incontinence   "          Assessment & Plan  1. Osteoporosis: This is chronic and unstable. Recent bone density scan showed a worsening of the lumbar spine bone density (T-score of -1.6) but an improvement in the left femur bone density (T-score of -1.5) with a hip fracture risk of 1.4%.  - Treatment plan: Continue Prolia every 6 months and follow up with her specialist at [facility] regarding this.    2. Hiatal hernia: This is a new diagnosis noted on an upper endoscopy in 03/2025, which also revealed two gastric polyps.  - Diagnostic plan: Follow up with GI as directed and get a repeat EGD in 3 years.    3. Chronic diarrhea from Small Intestinal Bacterial Overgrowth (SIBO): This is chronic and improved. She has been struggling with diarrhea for 13 years. Recently, she saw a local GI provider who tested her for SIBO, which was positive. She completed 2 weeks of Xifaxan and neomycin with complete resolution of her symptoms.  - Treatment plan: Follow up with GI as directed.    4. Environmental allergies: This is chronic and stable. She was prescribed montelukast by another provider for the allergies but never started it. It is reasonable to avoid it for now as she did not exhibit any lung symptoms.  - Treatment plan: Continue her current regimen of Flonase everyday and Zyrtec every day with the addition of Benadryl as needed for breakthrough symptoms and saline eyedrops as needed for breakthrough eye symptoms.    5. Urinary incontinence: This is chronic and stable. She has done pelvic floor therapy without much improvement in her symptoms.  - Treatment plan: Make a follow-up appointment with the urogynecologist.    6. Health maintenance: Mammogram and ultrasound were unremarkable in 01/2025. CA-125 test was normal on 06/12/2025.  - Diagnostic plan: Next CA-125 test is due 9/2025, unless advised to extend to 6 months.      Referral for genetic research was offered. Patient is a participant.        Return in about 6 months (around  12/26/2025) for Medicare Wellness.    Please note that this dictation was created using voice recognition software. I have made every reasonable attempt to correct obvious errors, but I expect that there are errors of grammar and possibly content that I did not discover before finalizing the note.

## 2025-08-14 ENCOUNTER — APPOINTMENT (OUTPATIENT)
Dept: MEDICAL GROUP | Facility: PHYSICIAN GROUP | Age: 75
End: 2025-08-14
Payer: MEDICARE